# Patient Record
Sex: FEMALE | Race: WHITE | NOT HISPANIC OR LATINO | Employment: OTHER | URBAN - METROPOLITAN AREA
[De-identification: names, ages, dates, MRNs, and addresses within clinical notes are randomized per-mention and may not be internally consistent; named-entity substitution may affect disease eponyms.]

---

## 2018-09-14 ENCOUNTER — HOSPITAL ENCOUNTER (OUTPATIENT)
Facility: MEDICAL CENTER | Age: 68
DRG: 270 | End: 2018-09-14
Admitting: HOSPITALIST
Payer: MEDICARE

## 2018-09-14 ENCOUNTER — HOSPITAL ENCOUNTER (INPATIENT)
Facility: MEDICAL CENTER | Age: 68
LOS: 8 days | DRG: 270 | End: 2018-09-22
Attending: HOSPITALIST | Admitting: HOSPITALIST
Payer: MEDICARE

## 2018-09-14 DIAGNOSIS — B95.7 STAPHYLOCOCCUS EPIDERMIDIS BACTEREMIA: ICD-10-CM

## 2018-09-14 DIAGNOSIS — R78.81 STAPHYLOCOCCUS EPIDERMIDIS BACTEREMIA: ICD-10-CM

## 2018-09-14 DIAGNOSIS — J43.2 CENTRILOBULAR EMPHYSEMA (HCC): Primary | ICD-10-CM

## 2018-09-14 PROCEDURE — 770006 HCHG ROOM/CARE - MED/SURG/GYN SEMI*

## 2018-09-14 PROCEDURE — 99407 BEHAV CHNG SMOKING > 10 MIN: CPT | Performed by: HOSPITALIST

## 2018-09-14 PROCEDURE — 99358 PROLONG SERVICE W/O CONTACT: CPT | Performed by: HOSPITALIST

## 2018-09-14 PROCEDURE — 99223 1ST HOSP IP/OBS HIGH 75: CPT | Mod: 25 | Performed by: HOSPITALIST

## 2018-09-14 RX ORDER — PREDNISONE 10 MG/1
10 TABLET ORAL DAILY
Status: DISCONTINUED | OUTPATIENT
Start: 2018-09-15 | End: 2018-09-15

## 2018-09-14 RX ORDER — DILTIAZEM HYDROCHLORIDE 360 MG/1
360 CAPSULE, EXTENDED RELEASE ORAL DAILY
Status: DISCONTINUED | OUTPATIENT
Start: 2018-09-15 | End: 2018-09-15

## 2018-09-14 RX ORDER — DILTIAZEM HYDROCHLORIDE 360 MG/1
360 CAPSULE, EXTENDED RELEASE ORAL DAILY
COMMUNITY

## 2018-09-14 RX ORDER — GUAIFENESIN 600 MG/1
1200 TABLET, EXTENDED RELEASE ORAL EVERY 12 HOURS
COMMUNITY

## 2018-09-14 RX ORDER — METOPROLOL SUCCINATE 100 MG/1
100 TABLET, EXTENDED RELEASE ORAL 2 TIMES DAILY
Status: DISCONTINUED | OUTPATIENT
Start: 2018-09-15 | End: 2018-09-15

## 2018-09-14 RX ORDER — TRAMADOL HYDROCHLORIDE 50 MG/1
50 TABLET ORAL EVERY 4 HOURS PRN
COMMUNITY

## 2018-09-14 RX ORDER — METOPROLOL SUCCINATE 100 MG/1
100 TABLET, EXTENDED RELEASE ORAL 2 TIMES DAILY
COMMUNITY

## 2018-09-14 RX ORDER — AMOXICILLIN 250 MG
2 CAPSULE ORAL 2 TIMES DAILY
Status: DISCONTINUED | OUTPATIENT
Start: 2018-09-15 | End: 2018-09-22 | Stop reason: HOSPADM

## 2018-09-14 RX ORDER — RANITIDINE 150 MG/1
150 TABLET ORAL DAILY
COMMUNITY

## 2018-09-14 RX ORDER — FAMOTIDINE 20 MG/1
20 TABLET, FILM COATED ORAL DAILY
Status: DISCONTINUED | OUTPATIENT
Start: 2018-09-15 | End: 2018-09-15

## 2018-09-14 RX ORDER — BISACODYL 10 MG
10 SUPPOSITORY, RECTAL RECTAL
Status: DISCONTINUED | OUTPATIENT
Start: 2018-09-14 | End: 2018-09-22 | Stop reason: HOSPADM

## 2018-09-14 RX ORDER — TRAMADOL HYDROCHLORIDE 50 MG/1
50 TABLET ORAL EVERY 4 HOURS PRN
Status: DISCONTINUED | OUTPATIENT
Start: 2018-09-14 | End: 2018-09-18

## 2018-09-14 RX ORDER — FUROSEMIDE 20 MG/1
20 TABLET ORAL
Status: DISCONTINUED | OUTPATIENT
Start: 2018-09-14 | End: 2018-09-16

## 2018-09-14 RX ORDER — OXYCODONE HYDROCHLORIDE 10 MG/1
10 TABLET ORAL
Status: DISCONTINUED | OUTPATIENT
Start: 2018-09-14 | End: 2018-09-18

## 2018-09-14 RX ORDER — OXYCODONE HYDROCHLORIDE 5 MG/1
5 TABLET ORAL
Status: DISCONTINUED | OUTPATIENT
Start: 2018-09-14 | End: 2018-09-18

## 2018-09-14 RX ORDER — HYDROMORPHONE HYDROCHLORIDE 2 MG/ML
0.5 INJECTION, SOLUTION INTRAMUSCULAR; INTRAVENOUS; SUBCUTANEOUS
Status: DISCONTINUED | OUTPATIENT
Start: 2018-09-14 | End: 2018-09-18

## 2018-09-14 RX ORDER — ONDANSETRON 4 MG/1
4 TABLET, ORALLY DISINTEGRATING ORAL EVERY 4 HOURS PRN
Status: DISCONTINUED | OUTPATIENT
Start: 2018-09-14 | End: 2018-09-22 | Stop reason: HOSPADM

## 2018-09-14 RX ORDER — FUROSEMIDE 20 MG/1
20 TABLET ORAL
COMMUNITY

## 2018-09-14 RX ORDER — PREDNISONE 10 MG/1
10 TABLET ORAL DAILY
Status: ON HOLD | COMMUNITY
End: 2018-09-22

## 2018-09-14 RX ORDER — BUDESONIDE AND FORMOTEROL FUMARATE DIHYDRATE 160; 4.5 UG/1; UG/1
2 AEROSOL RESPIRATORY (INHALATION) EVERY 12 HOURS
Status: DISCONTINUED | OUTPATIENT
Start: 2018-09-15 | End: 2018-09-17

## 2018-09-14 RX ORDER — ONDANSETRON 2 MG/ML
4 INJECTION INTRAMUSCULAR; INTRAVENOUS EVERY 4 HOURS PRN
Status: DISCONTINUED | OUTPATIENT
Start: 2018-09-14 | End: 2018-09-22 | Stop reason: HOSPADM

## 2018-09-14 RX ORDER — POLYETHYLENE GLYCOL 3350 17 G/17G
1 POWDER, FOR SOLUTION ORAL
Status: DISCONTINUED | OUTPATIENT
Start: 2018-09-14 | End: 2018-09-22 | Stop reason: HOSPADM

## 2018-09-14 ASSESSMENT — COGNITIVE AND FUNCTIONAL STATUS - GENERAL
TURNING FROM BACK TO SIDE WHILE IN FLAT BAD: A LITTLE
STANDING UP FROM CHAIR USING ARMS: A LITTLE
HELP NEEDED FOR BATHING: A LITTLE
TOILETING: A LITTLE
SUGGESTED CMS G CODE MODIFIER MOBILITY: CK
WALKING IN HOSPITAL ROOM: A LITTLE
MOBILITY SCORE: 18
DAILY ACTIVITIY SCORE: 19
SUGGESTED CMS G CODE MODIFIER DAILY ACTIVITY: CK
DRESSING REGULAR LOWER BODY CLOTHING: A LITTLE
CLIMB 3 TO 5 STEPS WITH RAILING: A LITTLE
MOVING TO AND FROM BED TO CHAIR: A LITTLE
MOVING FROM LYING ON BACK TO SITTING ON SIDE OF FLAT BED: A LITTLE
PERSONAL GROOMING: A LITTLE
DRESSING REGULAR UPPER BODY CLOTHING: A LITTLE

## 2018-09-14 ASSESSMENT — PATIENT HEALTH QUESTIONNAIRE - PHQ9
2. FEELING DOWN, DEPRESSED, IRRITABLE, OR HOPELESS: NOT AT ALL
SUM OF ALL RESPONSES TO PHQ9 QUESTIONS 1 AND 2: 0
1. LITTLE INTEREST OR PLEASURE IN DOING THINGS: NOT AT ALL

## 2018-09-14 ASSESSMENT — COPD QUESTIONNAIRES
DO YOU EVER COUGH UP ANY MUCUS OR PHLEGM?: YES, A FEW DAYS A WEEK OR MONTH
HAVE YOU SMOKED AT LEAST 100 CIGARETTES IN YOUR ENTIRE LIFE: YES
COPD SCREENING SCORE: 8
IN THE PAST 12 MONTHS DO YOU DO LESS THAN YOU USED TO BECAUSE OF YOUR BREATHING PROBLEMS: AGREE
DURING THE PAST 4 WEEKS HOW MUCH DID YOU FEEL SHORT OF BREATH: MOST  OR ALL OF THE TIME

## 2018-09-14 ASSESSMENT — LIFESTYLE VARIABLES
ALCOHOL_USE: NO
EVER_SMOKED: YES

## 2018-09-14 ASSESSMENT — PAIN SCALES - GENERAL: PAINLEVEL_OUTOF10: 2

## 2018-09-14 NOTE — PROGRESS NOTES
Direct admit from [facility]. Accepted by Dr. NORMA Kendall for infected portacath. Dr Ghotra will consult. ADT signed & held, needs to be released upon pt arrival.  No written orders received.

## 2018-09-15 ENCOUNTER — APPOINTMENT (OUTPATIENT)
Dept: RADIOLOGY | Facility: MEDICAL CENTER | Age: 68
DRG: 270 | End: 2018-09-15
Attending: INTERNAL MEDICINE
Payer: MEDICARE

## 2018-09-15 ENCOUNTER — APPOINTMENT (OUTPATIENT)
Dept: RADIOLOGY | Facility: MEDICAL CENTER | Age: 68
DRG: 270 | End: 2018-09-15
Attending: ORTHOPAEDIC SURGERY
Payer: MEDICARE

## 2018-09-15 ENCOUNTER — APPOINTMENT (OUTPATIENT)
Dept: RADIOLOGY | Facility: MEDICAL CENTER | Age: 68
DRG: 270 | End: 2018-09-15
Attending: SURGERY
Payer: MEDICARE

## 2018-09-15 PROBLEM — D68.318 ACQUIRED CIRCULATING ANTICOAGULANTS (HCC): Status: ACTIVE | Noted: 2018-09-15

## 2018-09-15 PROBLEM — J96.21 ACUTE ON CHRONIC RESPIRATORY FAILURE WITH HYPOXIA (HCC): Status: ACTIVE | Noted: 2018-09-15

## 2018-09-15 PROBLEM — B95.7 STAPHYLOCOCCUS EPIDERMIDIS BACTEREMIA: Status: ACTIVE | Noted: 2018-09-15

## 2018-09-15 PROBLEM — A41.9 SEPSIS (HCC): Status: ACTIVE | Noted: 2018-09-15

## 2018-09-15 PROBLEM — Z72.0 TOBACCO ABUSE: Status: ACTIVE | Noted: 2018-09-15

## 2018-09-15 PROBLEM — I48.0 PAROXYSMAL ATRIAL FIBRILLATION (HCC): Status: ACTIVE | Noted: 2018-09-15

## 2018-09-15 PROBLEM — E88.01 ALPHA-1-ANTITRYPSIN DEFICIENCY (HCC): Status: ACTIVE | Noted: 2018-09-15

## 2018-09-15 PROBLEM — F17.200 TOBACCO DEPENDENCE: Status: ACTIVE | Noted: 2018-09-15

## 2018-09-15 PROBLEM — J44.9 COPD (CHRONIC OBSTRUCTIVE PULMONARY DISEASE) (HCC): Status: ACTIVE | Noted: 2018-09-15

## 2018-09-15 PROBLEM — J18.9 CAP (COMMUNITY ACQUIRED PNEUMONIA): Status: ACTIVE | Noted: 2018-09-15

## 2018-09-15 PROBLEM — J96.11 CHRONIC HYPOXEMIC RESPIRATORY FAILURE (HCC): Status: ACTIVE | Noted: 2018-09-15

## 2018-09-15 PROBLEM — T80.219A INFECTED VENOUS ACCESS PORT: Status: ACTIVE | Noted: 2018-09-15

## 2018-09-15 PROBLEM — K21.9 GERD (GASTROESOPHAGEAL REFLUX DISEASE): Status: ACTIVE | Noted: 2018-09-15

## 2018-09-15 PROBLEM — R78.81 BACTEREMIA: Status: ACTIVE | Noted: 2018-09-15

## 2018-09-15 LAB
ACTION RANGE TRIGGERED IACRT: YES
ALBUMIN SERPL BCP-MCNC: 3.3 G/DL (ref 3.2–4.9)
ALBUMIN/GLOB SERPL: 1.4 G/DL
ALP SERPL-CCNC: 53 U/L (ref 30–99)
ALT SERPL-CCNC: 24 U/L (ref 2–50)
ANION GAP SERPL CALC-SCNC: 3 MMOL/L (ref 0–11.9)
AST SERPL-CCNC: 11 U/L (ref 12–45)
BASE EXCESS BLDA CALC-SCNC: 7 MMOL/L (ref -4–3)
BASE EXCESS BLDA CALC-SCNC: 8 MMOL/L (ref -4–3)
BASE EXCESS BLDA CALC-SCNC: 9 MMOL/L (ref -4–3)
BASOPHILS # BLD AUTO: 0.2 % (ref 0–1.8)
BASOPHILS # BLD: 0.03 K/UL (ref 0–0.12)
BILIRUB SERPL-MCNC: 0.4 MG/DL (ref 0.1–1.5)
BODY TEMPERATURE: ABNORMAL DEGREES
BUN SERPL-MCNC: 18 MG/DL (ref 8–22)
CALCIUM SERPL-MCNC: 8.8 MG/DL (ref 8.5–10.5)
CHLORIDE SERPL-SCNC: 95 MMOL/L (ref 96–112)
CHOLEST SERPL-MCNC: 204 MG/DL (ref 100–199)
CO2 BLDA-SCNC: 35 MMOL/L (ref 20–33)
CO2 BLDA-SCNC: 38 MMOL/L (ref 20–33)
CO2 BLDA-SCNC: 41 MMOL/L (ref 20–33)
CO2 SERPL-SCNC: 36 MMOL/L (ref 20–33)
CREAT SERPL-MCNC: 0.4 MG/DL (ref 0.5–1.4)
EKG IMPRESSION: NORMAL
EOSINOPHIL # BLD AUTO: 0.18 K/UL (ref 0–0.51)
EOSINOPHIL NFR BLD: 0.9 % (ref 0–6.9)
ERYTHROCYTE [DISTWIDTH] IN BLOOD BY AUTOMATED COUNT: 47.2 FL (ref 35.9–50)
EST. AVERAGE GLUCOSE BLD GHB EST-MCNC: 146 MG/DL
GLOBULIN SER CALC-MCNC: 2.3 G/DL (ref 1.9–3.5)
GLUCOSE SERPL-MCNC: 129 MG/DL (ref 65–99)
HBA1C MFR BLD: 6.7 % (ref 0–5.6)
HCO3 BLDA-SCNC: 33.5 MMOL/L (ref 17–25)
HCO3 BLDA-SCNC: 36 MMOL/L (ref 17–25)
HCO3 BLDA-SCNC: 38.7 MMOL/L (ref 17–25)
HCT VFR BLD AUTO: 42.3 % (ref 37–47)
HDLC SERPL-MCNC: 72 MG/DL
HGB BLD-MCNC: 12.8 G/DL (ref 12–16)
IMM GRANULOCYTES # BLD AUTO: 0.14 K/UL (ref 0–0.11)
IMM GRANULOCYTES NFR BLD AUTO: 0.7 % (ref 0–0.9)
INST. QUALIFIED PATIENT IIQPT: YES
LDLC SERPL CALC-MCNC: 113 MG/DL
LYMPHOCYTES # BLD AUTO: 3.56 K/UL (ref 1–4.8)
LYMPHOCYTES NFR BLD: 18.1 % (ref 22–41)
MCH RBC QN AUTO: 26 PG (ref 27–33)
MCHC RBC AUTO-ENTMCNC: 30.3 G/DL (ref 33.6–35)
MCV RBC AUTO: 85.8 FL (ref 81.4–97.8)
MONOCYTES # BLD AUTO: 1.84 K/UL (ref 0–0.85)
MONOCYTES NFR BLD AUTO: 9.3 % (ref 0–13.4)
NEUTROPHILS # BLD AUTO: 13.95 K/UL (ref 2–7.15)
NEUTROPHILS NFR BLD: 70.8 % (ref 44–72)
NRBC # BLD AUTO: 0 K/UL
NRBC BLD-RTO: 0 /100 WBC
O2/TOTAL GAS SETTING VFR VENT: 40 %
O2/TOTAL GAS SETTING VFR VENT: 50 %
O2/TOTAL GAS SETTING VFR VENT: 50 %
PCO2 BLDA: 53.7 MMHG (ref 26–37)
PCO2 BLDA: 64.8 MMHG (ref 26–37)
PCO2 BLDA: 81.9 MMHG (ref 26–37)
PCO2 TEMP ADJ BLDA: 53.5 MMHG (ref 26–37)
PCO2 TEMP ADJ BLDA: 63.1 MMHG (ref 26–37)
PH BLDA: 7.28 [PH] (ref 7.4–7.5)
PH BLDA: 7.35 [PH] (ref 7.4–7.5)
PH BLDA: 7.4 [PH] (ref 7.4–7.5)
PH TEMP ADJ BLDA: 7.36 [PH] (ref 7.4–7.5)
PH TEMP ADJ BLDA: 7.4 [PH] (ref 7.4–7.5)
PLATELET # BLD AUTO: 279 K/UL (ref 164–446)
PMV BLD AUTO: 9.8 FL (ref 9–12.9)
PO2 BLDA: 53 MMHG (ref 64–87)
PO2 BLDA: 67 MMHG (ref 64–87)
PO2 BLDA: 72 MMHG (ref 64–87)
PO2 TEMP ADJ BLDA: 52 MMHG (ref 64–87)
PO2 TEMP ADJ BLDA: 64 MMHG (ref 64–87)
POTASSIUM SERPL-SCNC: 4 MMOL/L (ref 3.6–5.5)
PROCALCITONIN SERPL-MCNC: <0.05 NG/ML
PROT SERPL-MCNC: 5.6 G/DL (ref 6–8.2)
RBC # BLD AUTO: 4.93 M/UL (ref 4.2–5.4)
SAO2 % BLDA: 86 % (ref 93–99)
SAO2 % BLDA: 91 % (ref 93–99)
SAO2 % BLDA: 91 % (ref 93–99)
SODIUM SERPL-SCNC: 134 MMOL/L (ref 135–145)
SPECIMEN DRAWN FROM PATIENT: ABNORMAL
TRIGL SERPL-MCNC: 92 MG/DL (ref 0–149)
TRIGL SERPL-MCNC: 93 MG/DL (ref 0–149)
TSH SERPL DL<=0.005 MIU/L-ACNC: 0.69 UIU/ML (ref 0.38–5.33)
WBC # BLD AUTO: 19.7 K/UL (ref 4.8–10.8)

## 2018-09-15 PROCEDURE — 94150 VITAL CAPACITY TEST: CPT

## 2018-09-15 PROCEDURE — 700111 HCHG RX REV CODE 636 W/ 250 OVERRIDE (IP): Performed by: INTERNAL MEDICINE

## 2018-09-15 PROCEDURE — 87116 MYCOBACTERIA CULTURE: CPT

## 2018-09-15 PROCEDURE — 83036 HEMOGLOBIN GLYCOSYLATED A1C: CPT

## 2018-09-15 PROCEDURE — 160041 HCHG SURGERY MINUTES - EA ADDL 1 MIN LEVEL 4: Performed by: SURGERY

## 2018-09-15 PROCEDURE — 51798 US URINE CAPACITY MEASURE: CPT

## 2018-09-15 PROCEDURE — 99223 1ST HOSP IP/OBS HIGH 75: CPT | Performed by: INTERNAL MEDICINE

## 2018-09-15 PROCEDURE — 700111 HCHG RX REV CODE 636 W/ 250 OVERRIDE (IP)

## 2018-09-15 PROCEDURE — 93005 ELECTROCARDIOGRAM TRACING: CPT | Performed by: SURGERY

## 2018-09-15 PROCEDURE — 87205 SMEAR GRAM STAIN: CPT

## 2018-09-15 PROCEDURE — 0P8B0ZZ DIVISION OF LEFT CLAVICLE, OPEN APPROACH: ICD-10-PCS | Performed by: SURGERY

## 2018-09-15 PROCEDURE — 770022 HCHG ROOM/CARE - ICU (200)

## 2018-09-15 PROCEDURE — 160048 HCHG OR STATISTICAL LEVEL 1-5: Performed by: SURGERY

## 2018-09-15 PROCEDURE — 73000 X-RAY EXAM OF COLLAR BONE: CPT | Mod: LT

## 2018-09-15 PROCEDURE — 700105 HCHG RX REV CODE 258: Performed by: HOSPITALIST

## 2018-09-15 PROCEDURE — 84145 PROCALCITONIN (PCT): CPT

## 2018-09-15 PROCEDURE — 501838 HCHG SUTURE GENERAL: Performed by: SURGERY

## 2018-09-15 PROCEDURE — 36600 WITHDRAWAL OF ARTERIAL BLOOD: CPT

## 2018-09-15 PROCEDURE — 80053 COMPREHEN METABOLIC PANEL: CPT

## 2018-09-15 PROCEDURE — 03C43ZZ EXTIRPATION OF MATTER FROM LEFT SUBCLAVIAN ARTERY, PERCUTANEOUS APPROACH: ICD-10-PCS | Performed by: SURGERY

## 2018-09-15 PROCEDURE — 87040 BLOOD CULTURE FOR BACTERIA: CPT | Mod: 91

## 2018-09-15 PROCEDURE — 71045 X-RAY EXAM CHEST 1 VIEW: CPT

## 2018-09-15 PROCEDURE — 99233 SBSQ HOSP IP/OBS HIGH 50: CPT | Performed by: INTERNAL MEDICINE

## 2018-09-15 PROCEDURE — 84443 ASSAY THYROID STIM HORMONE: CPT

## 2018-09-15 PROCEDURE — 87102 FUNGUS ISOLATION CULTURE: CPT

## 2018-09-15 PROCEDURE — 87075 CULTR BACTERIA EXCEPT BLOOD: CPT

## 2018-09-15 PROCEDURE — 160029 HCHG SURGERY MINUTES - 1ST 30 MINS LEVEL 4: Performed by: SURGERY

## 2018-09-15 PROCEDURE — 84478 ASSAY OF TRIGLYCERIDES: CPT

## 2018-09-15 PROCEDURE — C1713 ANCHOR/SCREW BN/BN,TIS/BN: HCPCS | Performed by: SURGERY

## 2018-09-15 PROCEDURE — 700102 HCHG RX REV CODE 250 W/ 637 OVERRIDE(OP): Performed by: HOSPITALIST

## 2018-09-15 PROCEDURE — 700101 HCHG RX REV CODE 250: Performed by: INTERNAL MEDICINE

## 2018-09-15 PROCEDURE — 700111 HCHG RX REV CODE 636 W/ 250 OVERRIDE (IP): Performed by: HOSPITALIST

## 2018-09-15 PROCEDURE — 99291 CRITICAL CARE FIRST HOUR: CPT | Performed by: INTERNAL MEDICINE

## 2018-09-15 PROCEDURE — 700101 HCHG RX REV CODE 250: Performed by: HOSPITALIST

## 2018-09-15 PROCEDURE — 700105 HCHG RX REV CODE 258: Performed by: INTERNAL MEDICINE

## 2018-09-15 PROCEDURE — 87206 SMEAR FLUORESCENT/ACID STAI: CPT

## 2018-09-15 PROCEDURE — 87070 CULTURE OTHR SPECIMN AEROBIC: CPT

## 2018-09-15 PROCEDURE — 700102 HCHG RX REV CODE 250 W/ 637 OVERRIDE(OP): Performed by: INTERNAL MEDICINE

## 2018-09-15 PROCEDURE — 80061 LIPID PANEL: CPT

## 2018-09-15 PROCEDURE — 0PSB04Z REPOSITION LEFT CLAVICLE WITH INTERNAL FIXATION DEVICE, OPEN APPROACH: ICD-10-PCS | Performed by: ORTHOPAEDIC SURGERY

## 2018-09-15 PROCEDURE — A9270 NON-COVERED ITEM OR SERVICE: HCPCS | Performed by: INTERNAL MEDICINE

## 2018-09-15 PROCEDURE — 94640 AIRWAY INHALATION TREATMENT: CPT

## 2018-09-15 PROCEDURE — A9270 NON-COVERED ITEM OR SERVICE: HCPCS | Performed by: HOSPITALIST

## 2018-09-15 PROCEDURE — 85025 COMPLETE CBC W/AUTO DIFF WBC: CPT

## 2018-09-15 PROCEDURE — 160009 HCHG ANES TIME/MIN: Performed by: SURGERY

## 2018-09-15 PROCEDURE — 87015 SPECIMEN INFECT AGNT CONCNTJ: CPT

## 2018-09-15 PROCEDURE — 94002 VENT MGMT INPAT INIT DAY: CPT

## 2018-09-15 PROCEDURE — 700101 HCHG RX REV CODE 250

## 2018-09-15 PROCEDURE — 36415 COLL VENOUS BLD VENIPUNCTURE: CPT

## 2018-09-15 PROCEDURE — 82803 BLOOD GASES ANY COMBINATION: CPT

## 2018-09-15 DEVICE — SCREW CORT 3.5X20MM ST HEX (4TX6+1TX4+1TX3=31)(SDS=22): Type: IMPLANTABLE DEVICE | Site: CLAVICLE | Status: FUNCTIONAL

## 2018-09-15 DEVICE — IMPLANTABLE DEVICE: Type: IMPLANTABLE DEVICE | Site: CLAVICLE | Status: FUNCTIONAL

## 2018-09-15 DEVICE — SCREW CORT 3.5X16MM ST HEX (4TX6+1TX4+1TX3=31)(SDS=22): Type: IMPLANTABLE DEVICE | Site: CLAVICLE | Status: FUNCTIONAL

## 2018-09-15 DEVICE — SCREW LOCK 3.5X18MM ST T15 - (4SFLX6+LPPELVX4=28): Type: IMPLANTABLE DEVICE | Site: CLAVICLE | Status: FUNCTIONAL

## 2018-09-15 DEVICE — SCREW LOCK 3.5X12MM ST T15 - (4SFLX6+LPPELVX4=28): Type: IMPLANTABLE DEVICE | Site: CLAVICLE | Status: FUNCTIONAL

## 2018-09-15 DEVICE — SCREW CORT 3.5X12MM ST HEX (4TX6+1TX4+1TX3=31)(SDS=22): Type: IMPLANTABLE DEVICE | Site: CLAVICLE | Status: FUNCTIONAL

## 2018-09-15 DEVICE — SCREW LOCK 3.5X20MM ST T15 - (4SFLX6+LPPELVX4=28): Type: IMPLANTABLE DEVICE | Site: CLAVICLE | Status: FUNCTIONAL

## 2018-09-15 RX ORDER — SODIUM CHLORIDE 9 MG/ML
500 INJECTION, SOLUTION INTRAVENOUS ONCE
Status: ACTIVE | OUTPATIENT
Start: 2018-09-15 | End: 2018-09-16

## 2018-09-15 RX ORDER — FUROSEMIDE 10 MG/ML
20 INJECTION INTRAMUSCULAR; INTRAVENOUS ONCE
Status: COMPLETED | OUTPATIENT
Start: 2018-09-15 | End: 2018-09-15

## 2018-09-15 RX ORDER — IPRATROPIUM BROMIDE AND ALBUTEROL SULFATE 2.5; .5 MG/3ML; MG/3ML
3 SOLUTION RESPIRATORY (INHALATION)
Status: DISCONTINUED | OUTPATIENT
Start: 2018-09-15 | End: 2018-09-16

## 2018-09-15 RX ORDER — HEPARIN SODIUM 5000 [USP'U]/ML
5000 INJECTION, SOLUTION INTRAVENOUS; SUBCUTANEOUS EVERY 8 HOURS
Status: DISCONTINUED | OUTPATIENT
Start: 2018-09-15 | End: 2018-09-17

## 2018-09-15 RX ORDER — GUAIFENESIN 600 MG/1
1200 TABLET, EXTENDED RELEASE ORAL EVERY 12 HOURS
Status: DISCONTINUED | OUTPATIENT
Start: 2018-09-15 | End: 2018-09-15

## 2018-09-15 RX ORDER — SODIUM CHLORIDE 9 MG/ML
INJECTION, SOLUTION INTRAVENOUS CONTINUOUS
Status: DISCONTINUED | OUTPATIENT
Start: 2018-09-15 | End: 2018-09-16

## 2018-09-15 RX ORDER — SODIUM CHLORIDE 9 MG/ML
500 INJECTION, SOLUTION INTRAVENOUS
Status: DISCONTINUED | OUTPATIENT
Start: 2018-09-15 | End: 2018-09-16

## 2018-09-15 RX ORDER — IPRATROPIUM BROMIDE AND ALBUTEROL SULFATE 2.5; .5 MG/3ML; MG/3ML
3 SOLUTION RESPIRATORY (INHALATION)
Status: DISCONTINUED | OUTPATIENT
Start: 2018-09-15 | End: 2018-09-21

## 2018-09-15 RX ORDER — IPRATROPIUM BROMIDE AND ALBUTEROL SULFATE 2.5; .5 MG/3ML; MG/3ML
3 SOLUTION RESPIRATORY (INHALATION)
Status: DISCONTINUED | OUTPATIENT
Start: 2018-09-15 | End: 2018-09-15 | Stop reason: ALTCHOICE

## 2018-09-15 RX ORDER — SODIUM CHLORIDE 9 MG/ML
500 INJECTION, SOLUTION INTRAVENOUS ONCE
Status: COMPLETED | OUTPATIENT
Start: 2018-09-15 | End: 2018-09-15

## 2018-09-15 RX ORDER — FAMOTIDINE 20 MG/1
20 TABLET, FILM COATED ORAL EVERY 12 HOURS
Status: DISCONTINUED | OUTPATIENT
Start: 2018-09-15 | End: 2018-09-16

## 2018-09-15 RX ORDER — SIMVASTATIN 10 MG
10 TABLET ORAL EVERY EVENING
Status: DISCONTINUED | OUTPATIENT
Start: 2018-09-15 | End: 2018-09-22 | Stop reason: HOSPADM

## 2018-09-15 RX ORDER — DILTIAZEM HCL 90 MG
90 TABLET ORAL EVERY 6 HOURS
Status: DISCONTINUED | OUTPATIENT
Start: 2018-09-15 | End: 2018-09-16

## 2018-09-15 RX ORDER — SODIUM CHLORIDE 9 MG/ML
INJECTION, SOLUTION INTRAVENOUS
Status: COMPLETED
Start: 2018-09-15 | End: 2018-09-15

## 2018-09-15 RX ORDER — METHYLPREDNISOLONE SODIUM SUCCINATE 125 MG/2ML
62.5 INJECTION, POWDER, LYOPHILIZED, FOR SOLUTION INTRAMUSCULAR; INTRAVENOUS EVERY 8 HOURS
Status: DISCONTINUED | OUTPATIENT
Start: 2018-09-15 | End: 2018-09-17

## 2018-09-15 RX ORDER — BUPIVACAINE HYDROCHLORIDE 5 MG/ML
INJECTION, SOLUTION EPIDURAL; INTRACAUDAL
Status: DISCONTINUED | OUTPATIENT
Start: 2018-09-15 | End: 2018-09-15 | Stop reason: HOSPADM

## 2018-09-15 RX ADMIN — HYDROMORPHONE HYDROCHLORIDE 0.5 MG: 2 INJECTION INTRAMUSCULAR; INTRAVENOUS; SUBCUTANEOUS at 17:07

## 2018-09-15 RX ADMIN — FENTANYL CITRATE 50 MCG: 50 INJECTION, SOLUTION INTRAMUSCULAR; INTRAVENOUS at 19:34

## 2018-09-15 RX ADMIN — SODIUM CHLORIDE 500 ML: 9 INJECTION, SOLUTION INTRAVENOUS at 19:00

## 2018-09-15 RX ADMIN — VANCOMYCIN HYDROCHLORIDE 1800 MG: 100 INJECTION, POWDER, LYOPHILIZED, FOR SOLUTION INTRAVENOUS at 01:19

## 2018-09-15 RX ADMIN — METHYLPREDNISOLONE SODIUM SUCCINATE 62.5 MG: 125 INJECTION, POWDER, FOR SOLUTION INTRAMUSCULAR; INTRAVENOUS at 21:29

## 2018-09-15 RX ADMIN — IPRATROPIUM BROMIDE AND ALBUTEROL SULFATE 3 ML: .5; 3 SOLUTION RESPIRATORY (INHALATION) at 16:22

## 2018-09-15 RX ADMIN — PROPOFOL 80 MCG/KG/MIN: 10 INJECTION, EMULSION INTRAVENOUS at 15:50

## 2018-09-15 RX ADMIN — METHYLPREDNISOLONE SODIUM SUCCINATE 62.5 MG: 125 INJECTION, POWDER, FOR SOLUTION INTRAMUSCULAR; INTRAVENOUS at 17:07

## 2018-09-15 RX ADMIN — SODIUM CHLORIDE: 9 INJECTION, SOLUTION INTRAVENOUS at 21:37

## 2018-09-15 RX ADMIN — METOPROLOL TARTRATE 100 MG: 25 TABLET, FILM COATED ORAL at 21:29

## 2018-09-15 RX ADMIN — PROPOFOL 50 MCG/KG/MIN: 10 INJECTION, EMULSION INTRAVENOUS at 19:32

## 2018-09-15 RX ADMIN — BUDESONIDE AND FORMOTEROL FUMARATE DIHYDRATE 2 PUFF: 160; 4.5 AEROSOL RESPIRATORY (INHALATION) at 06:01

## 2018-09-15 RX ADMIN — HEPARIN SODIUM 5000 UNITS: 5000 INJECTION, SOLUTION INTRAVENOUS; SUBCUTANEOUS at 21:29

## 2018-09-15 RX ADMIN — GUAIFENESIN 200 MG: 100 SOLUTION ORAL at 21:29

## 2018-09-15 RX ADMIN — FAMOTIDINE 20 MG: 10 INJECTION, SOLUTION INTRAVENOUS at 18:58

## 2018-09-15 RX ADMIN — FUROSEMIDE 20 MG: 10 INJECTION, SOLUTION INTRAMUSCULAR; INTRAVENOUS at 20:01

## 2018-09-15 RX ADMIN — IPRATROPIUM BROMIDE AND ALBUTEROL SULFATE 3 ML: .5; 3 SOLUTION RESPIRATORY (INHALATION) at 18:54

## 2018-09-15 ASSESSMENT — PAIN SCALES - GENERAL
PAINLEVEL_OUTOF10: 0
PAINLEVEL_OUTOF10: 2

## 2018-09-15 ASSESSMENT — PATIENT HEALTH QUESTIONNAIRE - PHQ9
SUM OF ALL RESPONSES TO PHQ9 QUESTIONS 1 AND 2: 0
1. LITTLE INTEREST OR PLEASURE IN DOING THINGS: NOT AT ALL
2. FEELING DOWN, DEPRESSED, IRRITABLE, OR HOPELESS: NOT AT ALL

## 2018-09-15 ASSESSMENT — PULMONARY FUNCTION TESTS: FVC: 1.2

## 2018-09-15 NOTE — CARE PLAN
Problem: Pain Management  Goal: Pain level will decrease to patient's comfort goal  Patient denying pain at this time. Encouraged patient to notify RN with pain.

## 2018-09-15 NOTE — ASSESSMENT & PLAN NOTE
- weaned off HFNC to NC at baseline of 5L NC  - cont steroid taper to 10mg which she takes at baseline

## 2018-09-15 NOTE — ASSESSMENT & PLAN NOTE
This is sepsis (without associated acute organ dysfunction).    Source is pneumonia, and infected venous port/bacteremia.  Sepsis is resolved, her hemodynamics are stable

## 2018-09-15 NOTE — PROGRESS NOTES
Assumed care of patient. Patient denying pain or nausea. Only complaint is being thirsty due to being NPO. Patient on 5 L of oxygen and this her baseline at home. Oxygen tubing switched to flexible, soft tubing. Patient ambulating to the bathroom with steady gait. Patient aware of possible surgery today. No other needs at this time. Call light within reach.

## 2018-09-15 NOTE — PROGRESS NOTES
Patient down to [facility] Pre Op with transport. Patient in gown and appropriate. SCD's in place. Patient voided prior to transports arrival. Patient going down on 5 L of oxygen.

## 2018-09-15 NOTE — PROGRESS NOTES
"Pharmacy Kinetics 68 y.o. female on vancomycin day # 1 9/15/2018    Currently on Vancomycin 1400 mg iv q24hr (0100)    Indication for Treatment: r/o bacteremia    Pertinent history per medical record: Admitted on 2018 for line infection.  Patient presented to [facility] facility with shortness of breath and evidence of pneumonia.  Later she had blood cultures grow staph epi with suspicion of a line infection.  Transferred to [facility] for vascular consult.     Other antibiotics: none    Allergies: Patient has no known allergies.     List concerns for renal function: BUN:SCr > 20, advanced age    Pertinent cultures to date:   none    Recent Labs      09/15/18   0345   WBC  19.7*   NEUTSPOLYS  70.80     Recent Labs      09/15/18   0345   BUN  18   CREATININE  0.40*   ALBUMIN  3.3     Intake/Output Summary (Last 24 hours) at 09/15/18 1405  Last data filed at 09/15/18 0805   Gross per 24 hour   Intake                0 ml   Output              500 ml   Net             -500 ml      Blood pressure 133/61, pulse 91, temperature 36.3 °C (97.3 °F), resp. rate 19, height 1.6 m (5' 3\"), weight 70.2 kg (154 lb 12.2 oz), SpO2 96 %, not currently breastfeeding. Temp (24hrs), Av.4 °C (97.5 °F), Min:36.2 °C (97.1 °F), Max:36.8 °C (98.2 °F)      A/P   1. Vancomycin dose change: not indicated  2. Next vancomycin level:  @ 0030 (not ordered)  3. Goal trough: 16-20 mcg/ml  4. Previous vancomycin treatment: no  5. MRSA nares swab: n/a  6. Comments: some concern for renal function with significantly elevated BUN:SCr ratio.  Will continue with plan for 1400 mg (20 mg/kg) q24h with first maintenance dose to be given 24 hrs after load.  Will plan to check a level prior to 4th total dose.  Pharmacy will monitor and adjust dosing if needed.      Deyanira Delacruz, PharmD    "

## 2018-09-15 NOTE — CONSULTS
Critical Care/Pulmonary Consultation    Date of Service: 9/15/2018    Date of Admission:  9/14/2018  9:46 PM    Consulting Physician: Vidal Ghotra M.D.    Chief Complaint:  Alpha-1 antitrypsin deficiency with chronic lung disease/hypoxemia    History of Present Illness: Mile Craig is a 68 y.o. female with a past medical history of alpha-1 antitrypsin deficiency on Prolastin intravenously q. weekly, COPD on home oxygen at 5 L/min nasal cannula, atrial fibrillation on Pradaxa who was evaluated at [facility] on September 14 complaining of shortness of breath and found to have evidence of pneumonia with fever, leukocytosis, abnormal chest x-ray findings.  She was started on antibiotics to cover for community acquired pneumonia.  Her blood cultures grew out staph epidermidis and the concern was that her port was infected and needed to be removed.  Removal was attempted by a surgeon at [facility] but unfortunately became sheared resulting in a retained catheter in the vessel.  She was transferred to [facility] for definitive care and seen by Dr. Vidal Gil who took her to the operating room today where she underwent successful retrieval of the catheter from the left subclavian/innominate vein but did require transection of the clavicle and repair of the subclavian vein followed by plating of the clavicle by orthopedics.  She was brought to the intensive care unit postprocedure for ongoing respiratory failure management and I was consulted by Dr. Ghotra.    Review of Systems   Unable to perform ROS: Critical illness       Home Medications     Reviewed by Carlos Day R.N. (Registered Nurse) on 09/15/18 at 1226  Med List Status: Complete   Medication Last Dose Status   bisacodyl (DULCOLAX) suppository 10 mg  Active   budesonide-formoterol (SYMBICORT) 160-4.5 MCG/ACT inhaler 2 Puff  Active   DILTIAZem CD (CARDIZEM CD) 360 MG CAPSULE SR 24 HR 9/14/2018 Active   DILTIAZem CD (CARDIZEM CD) capsule  "360 mg  Active   famotidine (PEPCID) tablet 20 mg  Active   fluticasone-salmeterol (ADVAIR) 500-50 MCG/DOSE AEROSOL POWDER, BREATH ACTIVATED  Active   furosemide (LASIX) 20 MG Tab 9/14/2018 Active   furosemide (LASIX) tablet 20 mg  Active   guaiFENesin LA (MUCINEX) 600 MG TABLET SR 12 HR 9/14/2018 Active   HYDROmorphone (DILAUDID) injection 0.5 mg  Active   ipratropium-albuterol (DUONEB) nebulizer solution  Active   magnesium hydroxide (MILK OF MAGNESIA) suspension 30 mL  Active   MD Alert...Vancomycin per Pharmacy  Active   metoprolol SR (TOPROL XL) 100 MG TABLET SR 24 HR 9/14/2018 Active   metoprolol SR (TOPROL XL) tablet 100 mg  Active   NON SPECIFIED  Active   NS (BOLUS) infusion 500 mL  Active   ondansetron (ZOFRAN ODT) dispertab 4 mg  Active   ondansetron (ZOFRAN) syringe/vial injection 4 mg  Active   oxyCODONE immediate release (ROXICODONE) tablet 10 mg  Active   oxyCODONE immediate-release (ROXICODONE) tablet 5 mg  Active   Pharmacy Consult Request ...Pain Management Review  Active   polyethylene glycol/lytes (MIRALAX) PACKET 1 Packet  Active   predniSONE (DELTASONE) 10 MG Tab 9/14/2018 Active   predniSONE (DELTASONE) tablet 10 mg  Active   raNITidine (ZANTAC) 150 MG Tab 9/14/2018 Active   Respiratory Care per Protocol  Active   senna-docusate (PERICOLACE or SENOKOT S) 8.6-50 MG per tablet 2 Tab  Active   tramadol (ULTRAM) 50 MG Tab 9/14/2018 Active   tramadol (ULTRAM) 50 MG tablet 50 mg  Active   vancomycin 1,400 mg in  mL IVPB  Active                Social History   Substance Use Topics   • Smoking status: Never Smoker   • Smokeless tobacco: Never Used   • Alcohol use Not on file        No past medical history on file.    No past surgical history on file.    Allergies: Patient has no known allergies.    No family history on file.    Vitals:    09/14/18 2125 09/14/18 2150 09/14/18 2350 09/15/18 0509   Height:  1.6 m (5' 3\")     Weight: 70.2 kg (154 lb 12.2 oz)      Weight % change since last entry.: 0 " %      BP: 146/60  139/60    Pulse: 81  75 82   Resp: 17  16 16   Temp: 36.8 °C (98.2 °F)  36.2 °C (97.1 °F)     09/15/18 0515 09/15/18 0705 09/15/18 1221   Height:      Weight:      Weight % change since last entry.:      BP: 139/64 133/61    Pulse: 79 81 91   Resp: 17 19    Temp: 36.3 °C (97.4 °F) 36.4 °C (97.6 °F) 36.3 °C (97.3 °F)       Physical Examination  General: Well-developed, well-nourished female sedated on mechanical ventilatory support in critical condition  Neuro/Psych: Heavily sedated currently postoperatively and withdraws to pain minimally, some agitation per RNs prior to sedation  HEENT: Normocephalic, atraumatic, PERRLA, dry oral mucosa membranes, endotracheal tube in position without evidence of intraoral trauma  CVS: Sinus tachycardia, distant heart sounds, no obvious murmur, nondisplaced PMI, radial pulses are 2+ and symmetric with brisk capillary refill, left anterior chest wall with dressing that is clean/dry/intact  Respiratory: Coarse rhonchi throughout bilateral lung fields without wheeze or prolonged next-door effort, breathing in synchrony with the ventilator with a mode of CMV, increased AP diameter the chest  Abdomen/: Soft, nontender, nondistended, normal bowel sounds, normal external female genitalia with Little catheter in place  Extremities: Moderate clubbing, no cyanosis, no edema, no calf asymmetry no palpable cord  Skin: Warm, pink, dry without lesions nor rash      Intake/Output Summary (Last 24 hours) at 09/15/18 1602  Last data filed at 09/15/18 0805   Gross per 24 hour   Intake                0 ml   Output              500 ml   Net             -500 ml       Recent Labs      09/15/18   0345   WBC  19.7*   NEUTSPOLYS  70.80   LYMPHOCYTES  18.10*   MONOCYTES  9.30   EOSINOPHILS  0.90   BASOPHILS  0.20   ASTSGOT  11*   ALTSGPT  24   ALKPHOSPHAT  53   TBILIRUBIN  0.4     Recent Labs      09/15/18   0345   SODIUM  134*   POTASSIUM  4.0   CHLORIDE  95*   CO2  36*   BUN  18    CREATININE  0.40*   CALCIUM  8.8     Recent Labs      09/15/18   0345   ALTSGPT  24   ASTSGOT  11*   ALKPHOSPHAT  53   TBILIRUBIN  0.4   GLUCOSE  129*           Invalid input(s): [id number]  DX-CHEST-PORTABLE (1 VIEW)   Final Result      Mild diffuse interstitial prominence/vascular congestion with probable trace effusions.      DX-PORTABLE FLUORO > 1 HOUR    (Results Pending)   DX-CLAVICLE LEFT    (Results Pending)       Patient Active Problem List   Diagnosis   • Staphylococcus epidermidis bacteremia, likely due to catheter related bloodstream infection/infected MediPort   • Infected venous access port   • Paroxysmal atrial fibrillation (HCC)   • Alpha-1-antitrypsin deficiency (HCC)   • COPD (chronic obstructive pulmonary disease) due to alpha-1 antitrypsin deficiency (HCC)   • Chronic hypoxemic respiratory failure due to COPD (HCC)   • Tobacco dependence   • GERD (gastroesophageal reflux disease)   • Sepsis due to CAP, CRBSI (HCC)   • Acquired circulating anticoagulants (HCC)   • CAP (community acquired pneumonia)       Assessment and Plan:  Acute on chronic hypoxemic respiratory failure -intubated perioperatively 9/15   - cont full vent support (ASV) working towards rapid extubation if tolerates   - CXR/ABG post-op   - RT/O2 protocols   - propofol gtt in the meantime  Alpha-1 antitrypsin deficiency/COPD on chronic 5 L/min nasal cannula 24 hours a day with acute exacerbation   - increase steroids to IV, BDs   - will need Prolastin when due if on formulary  Community acquired pneumonia with staph epidermidis bacteremia   - cont abx, ID consulted   - repeat Bcx   - KRISSY  Retained port catheter status post retrieval/left clavicle transection/subclavian vein repair 9/15   - surgical site management  Gastroesophageal reflux disease - PPI  Paroxysmal atrial fibrillation on chronic anticoagulation   - rate controlled, cont dilt and BB if BP tolerates   - holding anticoagulation until cleared by surgery  Tobacco  dependence - nicotine patch  Sepsis from pulmonary source    - s/p sepsis protocol   - monitor fluid and hemodynamic status closely   - abx  Hyponatremia   - monitor, MIVFs until rebecca diet  HLD - statin  Prophylaxis, NPO    Patient is critically ill requiring my active management of her mechanical ventilatory support, ongoing hypoxemic respiratory failure.    High risk of deterioration and worsening vital organ dysfunction and death without the above critical care interventions.    Discussed assessment/plan with RN, RT, pharmacy, Dr. Gil.  Critical care time: 35 minutes.  No time overlap.  Procedures are not included in this time.    Jeremy M Gonda, MD  [facility]

## 2018-09-15 NOTE — H&P
Hospital Medicine History & Physical Note    Date of Service  9/14/2018    Primary Care Physician  No primary care provider on file.    Consultants  Dr. Ghotra    Code Status  Full    Chief Complaint  Shortness of breath     History of Presenting Illness  68 y.o. female who presented 9/14/2018 with initial presentation of shortness of breath.  She is a transfer from [facility] she presented with dyspnea for 1 day found to have fever leukocytosis with bandemia and evidence of pneumonia and sepsis.  She was initially started on antibiotics treated for COPD exacerbation secondary to community-acquired pneumonia.  She subsequently had blood cultures drawn grew staph epi.  On 2 peripheral sites the patient does have an indwelling central line PowerPort for several years for her weekly Prolastin infusions for alpha-1 antitrypsin deficiency.  There is concern by infectious disease curbside consult for line infection.  The catheter seems to be infected and the patient was transferred here for vascular surgery consultation.  Patient was on Pradaxa for atrial fibrillation and this was discontinued 2 days ago.  Currently the patient still has shortness of breath at her baseline COPD 5 L.  She denies any fevers chills sweats.  Upon personal review of outside hospital records.  Patient has a leukocytosis of 21.5 hemoglobin 13.3 platelet count 263 sodium 138 potassium 4.5 chloride 95 CO2 32 glucose 126 BUN 18 creatinine 0.54        Review of Systems  ROS    Past Medical History  COPD alpha-1 antitrypsin deficiency paroxysmal atrial fibrillation diastolic dysfunction tobacco abuse chronic hypoxemic respiratory failure on 5 L baseline    Surgical History  Reviewed and noncontributory    Family History  Reviewed and noncontributory     Social History  Smoker 1/2ppd, denies alcohol or drug use     Allergies  No Known Allergies    Medications  Prior to Admission Medications   Prescriptions Last Dose Informant Patient Reported?  Taking?   DILTIAZem CD (CARDIZEM CD) 360 MG CAPSULE SR 24 HR 2018 at  1014  Yes Yes   Sig: Take 360 mg by mouth every day.   NON SPECIFIED   Yes Yes   Si mg/kg by Intravenous route every 7 days.   fluticasone-salmeterol (ADVAIR) 500-50 MCG/DOSE AEROSOL POWDER, BREATH ACTIVATED   Yes Yes   Sig: Inhale 1 Puff by mouth every 12 hours.   furosemide (LASIX) 20 MG Tab 2018 at Unknown time  Yes Yes   Sig: Take 20 mg by mouth 1 time daily as needed.   guaiFENesin LA (MUCINEX) 600 MG TABLET SR 12 HR 2018 at 1013  Yes Yes   Sig: Take 1,200 mg by mouth every 12 hours.   metoprolol SR (TOPROL XL) 100 MG TABLET SR 24 HR 2018 at 1013  Yes Yes   Sig: Take 100 mg by mouth 2 Times a Day.   predniSONE (DELTASONE) 10 MG Tab 2018 at  1013  Yes Yes   Sig: Take 10 mg by mouth every day.   raNITidine (ZANTAC) 150 MG Tab 2018 at  1013  Yes Yes   Sig: Take 150 mg by mouth every day.   tramadol (ULTRAM) 50 MG Tab 2018 at 1517  Yes Yes   Sig: Take 50 mg by mouth every four hours as needed for Moderate Pain.      Facility-Administered Medications: None       Physical Exam  Blood Pressure : 146/60   Temperature: 36.8 °C (98.2 °F)   Pulse: 81   Respiration: 17   Pulse Oximetry: 94 %     Physical Exam   Constitutional: She is oriented to person, place, and time. She appears well-developed and well-nourished. No distress.   HENT:   Head: Normocephalic and atraumatic.   Eyes: Pupils are equal, round, and reactive to light. Conjunctivae and EOM are normal.   Neck: Normal range of motion. Neck supple. No JVD present.   Cardiovascular: Normal rate, regular rhythm, normal heart sounds and intact distal pulses.    No murmur heard.  Pulmonary/Chest: Effort normal. No respiratory distress. She has wheezes. She exhibits tenderness.   Mild wheezing; Left sided port with tenderness around site   Abdominal: Soft. Bowel sounds are normal. She exhibits no distension. There is no tenderness.   Musculoskeletal: Normal range  of motion. She exhibits no edema.   Neurological: She is alert and oriented to person, place, and time. She exhibits normal muscle tone.   Skin: Skin is warm and dry.   Psychiatric: She has a normal mood and affect. Her behavior is normal. Judgment and thought content normal.   Nursing note and vitals reviewed.      Laboratory:      leukocytosis of 21.5 hemoglobin 13.3 platelet count 263 sodium 138 potassium 4.5 chloride 95 CO2 32 glucose 126 BUN 18 creatinine 0.54        No results for input(s): ALTSGPT, ASTSGOT, ALKPHOSPHAT, TBILIRUBIN, DBILIRUBIN, GAMMAGT, AMYLASE, LIPASE, ALB, PREALBUMIN, GLUCOSE in the last 72 hours.              No results found for: TROPONINI    Urinalysis:    No results found     Imaging:  No orders to display   Outside hospital chest x-ray with small bilateral pleural effusions and left basilar atelectasis outside hospital echocardiogram    Left ventricle normal ventricle size wall thickness systolic function left ventricular ejection fraction is 65% right ventricle normal right ventricle size and systolic function right atrium normal right side atrial size dilated inferior vena cava without inspiratory collapse left atrium normal left atrial size mitral valve thickened mitral valve leaflets mitral annular calcification no mitral stenosis mitral regurgitation structurally normal aortic valve nor aortic stenosis no aortic insufficiency structurally normal tricuspid valve no tricuspid stenosis mild tricuspid regurgitation estimated right ventricular systolic pressure is 35 mmHg pulmonic valve is normal and no pericardial effusion normal aortic root      Assessment/Plan:  I anticipate this patient will require at least two midnights for appropriate medical management, necessitating inpatient admission.    * Bacteremia   Assessment & Plan    Presents with bactermia staph epi, concern for infected Port  Dr. Ghotra consulted for eval, NPO for now   COnitnue with IV vancomycin   Will repeat blood  cultures  Needs ID consult in am   Echo from OSH with thickened mitral valve leaflets patient will need KRISSY, will order          Sepsis (HCC)   Assessment & Plan    This is sepsis (without associated acute organ dysfunction).   Due to infected line?   Continue with IV abx   Follow blood cultures repeat        GERD (gastroesophageal reflux disease)   Assessment & Plan    Resume pepcid        Tobacco abuse   Assessment & Plan    Greater than 10 minutes spent with patient smoking cessation counseling.  We discussed the risk of cardiopulmonary disease associated with smoking started nicotine patch as well.        Chronic hypoxemic respiratory failure (HCC)   Assessment & Plan    At baseline 5 l 02         COPD (chronic obstructive pulmonary disease) ([facility])   Assessment & Plan    Severe copd, with hx of alpha 1 anti trypsin def   Resume resp care protocol  Not in acute exacerbation   Symbicort, prednisone 10 mg daily home medication resume           Alpha-1-antitrypsin deficiency ([facility])   Assessment & Plan    Hx of with port access, for weekly infusion of prolastin           Paroxysmal atrial fibrillation ([facility])   Assessment & Plan    Paroxysmal atrial fibrillation on metoprolol and diltiazem will resume.  Has been on Xarelto however held due to possible surgical intervention        Infected venous access port   Assessment & Plan    Potential infected venous access port Dr. Gil has been consulted  NPO for possible surgery            VTE prophylaxis: SCD    I spent a total of 31 minutes of non face to face time performing additional research, reviewing old medical records, provided on going management by following up on labs, placing orders, discussing plan of care with other healthcare providers and nursing staff. Start time: 11:40 pm. End time: 1:11 am

## 2018-09-15 NOTE — ASSESSMENT & PLAN NOTE
- MediPort removed  - On IV Vanco; repeat Cxs NGTD  - ID to switch IV Vanc to PO Linezolid for 2 wks  - PICC line placement pending for Prolastin treatments

## 2018-09-15 NOTE — PROGRESS NOTES
Patient alert and oriented x 4.  Arrived on unit via [facility].  2 RNs skin assessment completed.  Port site with dressing. No bleeding noted.  Redness to buttocks, non blanchable.  Edema noted to bilateral lower extremities.  Complained of pain 2/10.  Bed in lowest and locked position. Call light   within reach.

## 2018-09-15 NOTE — PROGRESS NOTES
[facility] Hospitalist Progress Note    Date of Service: 9/15/2018    Chief Complaint  68 y.o. 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  Initially seen at an outside hospital in [place], where she was found to have fever, leukocytosis with bandemia, and evidence of pneumonia and sepsis.  She was treated with antibiotics for community-acquired pneumonia.  Blood cultures from 2 peripheral sites grew staph epidermidis.  She does have indwelling central line/PowerPort for several years for her Prolastin infusion for her alpha-1 antitrypsin deficiency.  Labs showed WBC of 21,500.  Dr. Gil of vascular surgery was consulted for evaluation, for surgical removal of the port.  Notably, echocardiogram from OSH with thickened mitral valve leaflets.      Interval Problem Update  9/15/2018 - no overnight events. Remains hemodynamically stable and afebrile. Stable on 5L O2 NC.  Repeat labs showed WBC of 19,700 no bandemia.  Sodium 134.    > I have personally seen and examined the patient today.  Comfortable.  At her baseline shortness of breath, has occasional cough but not much phlegm, and near baseline.  No nausea, vomiting, abdominal pain, diarrhea.      Consultants/Specialty  Vascular surgery  ID    Disposition  Monitor in the [facility]        ROS     Pertinent positives/negatives as mentioned above.     A complete review of systems was personally done by me. All other systems were negative.      Physical Exam  Laboratory/Imaging   Hemodynamics  Temp (24hrs), Av.4 °C (97.6 °F), Min:36.2 °C (97.1 °F), Max:36.8 °C (98.2 °F)   Temperature: 36.3 °C (97.4 °F)  Pulse  Av.3  Min: 75  Max: 82    Blood Pressure : 139/64      Respiratory      Respiration: 17, Pulse Oximetry: 96 %, O2 Daily Delivery Respiratory : Silicone Nasal Cannula     Work Of Breathing / Effort: Mild  RUL Breath  Sounds: Rhonchi, RML Breath Sounds: Diminished, RLL Breath Sounds: Diminished, OPAL Breath Sounds: Rhonchi, LLL Breath Sounds: Diminished    Fluids    Intake/Output Summary (Last 24 hours) at 09/15/18 0805  Last data filed at 09/15/18 0515   Gross per 24 hour   Intake                0 ml   Output                0 ml   Net                0 ml       Nutrition  Orders Placed This Encounter   Procedures   • Diet NPO     Standing Status:   Standing     Number of Occurrences:   1     Order Specific Question:   Restrict to:     Answer:   Strict [1]     Physical Exam   Constitutional: She is oriented to person, place, and time. She appears well-developed and well-nourished. No distress.   HENT:   Head: Normocephalic and atraumatic.   Mouth/Throat: No oropharyngeal exudate.   Eyes: Pupils are equal, round, and reactive to light. Conjunctivae are normal. No scleral icterus.   Neck: Normal range of motion. Neck supple.   Cardiovascular: Normal rate and regular rhythm.  Exam reveals no gallop and no friction rub.    No murmur heard.  Pulmonary/Chest: Effort normal. No respiratory distress. She has no wheezes. She has no rales.   Diminished air entry B/L bases, otherwise clear to auscultation  (+) tenderness over the port on the left upper chest   Abdominal: Soft. Bowel sounds are normal. She exhibits no distension. There is no tenderness. There is no rebound and no guarding.   Musculoskeletal: Normal range of motion. She exhibits no edema or tenderness.   Lymphadenopathy:     She has no cervical adenopathy.   Neurological: She is alert and oriented to person, place, and time. No cranial nerve deficit. Coordination normal.   Skin: Skin is warm and dry. No rash noted. She is not diaphoretic. No erythema. No pallor.   Psychiatric: She has a normal mood and affect. Her behavior is normal. Judgment and thought content normal.   Nursing note and vitals reviewed.           Recent Labs      09/15/18   0345   WBC  19.7*   RBC  4.93    HEMOGLOBIN  12.8   HEMATOCRIT  42.3   MCV  85.8   MCH  26.0*   MCHC  30.3*   RDW  47.2   PLATELETCT  279   MPV  9.8     Recent Labs      09/15/18   0345   SODIUM  134*   POTASSIUM  4.0   CHLORIDE  95*   CO2  36*   GLUCOSE  129*   BUN  18   CREATININE  0.40*   CALCIUM  8.8             Recent Labs      09/15/18   0345   TRIGLYCERIDE  93   HDL  72   LDL  113*          Assessment/Plan     * Staphylococcus epidermidis bacteremia, likely due to catheter related bloodstream infection/infected MediPort- (present on admission)   Assessment & Plan    -Continue IV vancomycin for now, suspect can be de-escalated.  I have consulted Dr. Wasserman of ID for further inputs.  -Repeat blood cultures ×2.  Needs repeat blood cultures every 48 hours, until negative.  -Echocardiogram from outside hospital showed thickened mitral valve leaflets.  I have spoken with Dr. Cuevas of cardiology, who will put her on the schedule for a KRISSY on Monday.  -Vascular surgery planning to remove port in the OR today.        Sepsis due to CAP, CRBSI (HCC)- (present on admission)   Assessment & Plan    -This is sepsis (without associated acute organ dysfunction).  Source is pneumonia, and infected venous port.  -Continue antibiotics.  Follow cultures.    -Monitor hemodynamics closely, with sepsis protocol and PRN IVF boluses.        Infected venous access port- (present on admission)   Assessment & Plan    -Unsuccessful attempt at removal at the outside hospital.  -Vascular surgery (Dr. Ghotra) plan for removal in the OR today.  -Continue vancomycin for now, can likely deescalate.  Repeat blood cultures. ID consulted.         CAP (community acquired pneumonia)- (present on admission)   Assessment & Plan    -Initial presentation at the outside hospital.  -Repeat chest x-ray to reevaluate.  Check procalcitonin.  -Continue respiratory support with bronchodilators per RT protocol, and oxygen supplements.        Acquired circulating anticoagulants (HCC)-  (present on admission)   Assessment & Plan    -Patient was on Pradaxa.  Continue to hold, until post procedure.         GERD (gastroesophageal reflux disease)- (present on admission)   Assessment & Plan    -Continue Pepcid.        Tobacco dependence- (present on admission)   Assessment & Plan    -Counseled on smoking cessation.        Chronic hypoxemic respiratory failure due to COPD (HCC)- (present on admission)   Assessment & Plan    -Stable at baseline oxygen requirement.  Continue bronchodilators per RT protocol, and oxygen supplements.        COPD (chronic obstructive pulmonary disease) due to alpha-1 antitrypsin deficiency (HCC)- (present on admission)   Assessment & Plan    -Not in acute exacerbation.  -Continue bronchodilators per RT protocol, and chronic prednisone at 10 mg daily.  Continue Symbicort in lieu of Advair while in the hospital.  Continue oxygen supplements.        Alpha-1-antitrypsin deficiency (HCC)- (present on admission)   Assessment & Plan    - with weekly infusion of prolastin.           Paroxysmal atrial fibrillation (HCC)- (present on admission)   Assessment & Plan    -Rate and rhythm controlled.  Continue home dose metoprolol and Cardizem.  Anticoagulation being held due to scheduled OR.          Quality-Core Measures   Reviewed items::  Labs reviewed, Medications reviewed and Radiology images reviewed  Little catheter::  No Little  DVT prophylaxis pharmacological::  Heparin  Ulcer Prophylaxis::  Yes  Antibiotics:  Treating active infection/contamination beyond 24 hours perioperative coverage

## 2018-09-15 NOTE — CONSULTS
DATE OF SERVICE:  09/15/2018    INFECTIOUS DISEASE CONSULTATION    REASON FOR CONSULT:  Infected port.    CONSULTING PHYSICIAN:  Max Romero MD    HISTORY OF PRESENT ILLNESS:  This is a very pleasant 68-year-old white female   who was originally admitted to the hospital on 09/14/2018 due to increasing   shortness of breath for 1 day prior to admission.  She is a transfer from   [facility].  When she presented there, she was noted to have fever,   leukocytosis, and sepsis initially thought to be due to pneumonia.  She was   started on antibiotics for community-acquired pneumonia and treated for COPD   exacerbation.  She subsequently grew Staph epidermidis from her blood   cultures.  Patient has an indwelling PowerPort for Prolastin infusions   for her alpha 1 antitrypsin deficiency.  Port removal was attempted, but was   found to be too complicated and due to significant concern for complications,   she was transferred to [facility] for vascular surgery evaluation   and for removal of the port.  She is currently on vancomycin, which she is   tolerating fairly well.  She has a some burning with the infusion,   but otherwise no complaints.  She is planned for explantation of the port   likely later today.  She is currently receiving vancomycin as stated above and   infectious disease is consulted for antibiotic recommendations and   management.    REVIEW OF SYSTEMS:  All other systems are reviewed and are negative.  Her   prior shortness of breath has improved and she is back to her baseline.    ALLERGIES:  She has no known drug allergies.    PAST MEDICAL HISTORY:  Alpha 1 antitrypsin deficiency, atrial fibrillation,   and chronic hypoxemic respiratory failure.    FAMILY HISTORY:  Patient denied other family members with lung disease.    SOCIAL HISTORY:  She is a smoker.  Denies alcohol or illicit drug use.  No   recent travel.  No sick contacts.    PHYSICAL EXAMINATION:  GENERAL:  She is a chronically  ill-appearing female, looks older than her   stated age.  VITAL SIGNS:  Temperature is 97.3, blood pressure 144/57, pulse 91,   saturations 96% on 5 L nasal cannula.  She weighs 70 kilos.  HEENT:  Normocephalic, atraumatic.  Pupils are equal, round, and reactive to   light.  Extraocular movements intact.  Oropharynx is clear.  She has fair   dentition.  NECK:  Supple.  CARDIOVASCULAR:  Irregular rate and rhythm.  CHEST:  She has decreased breath sounds bilaterally, left-side port.  There is   surgical site with sutures.  The superior portion of the port has been   removed; however, she states the rest of it remains in place.  ABDOMEN:  Soft, nontender.  EXTREMITIES:  Show no cyanosis, clubbing, or edema.  NEUROLOGIC:  She is awake, alert, and oriented.  Speech is without dysarthria.    Cranial nerves were intact.    LABORATORY DATA:  Prior white blood cell count was 21, now 19.7, H and H of   12.8 and 42, and platelets 279.  Sodium 134, potassium 4, chloride 95,   bicarbonate 36, glucose 129, BUN 18, creatinine 0.4, AST 11, ALT 24, alkaline   phosphatase 53, and albumin 3.3.    IMAGING:  Chest x-ray showed a left subclavian central line with tip in the   superior vena cava, mild interstitial disease.    ASSESSMENT AND PLAN:  1.  Staphylococcus epidermidis sepsis due to infected port.  She was seen and   evaluated by vascular surgery.  She is planned for explantation of the port   likely today.  She is currently afebrile, but still has significant   leukocytosis.  Repeat blood cultures have been ordered.  Agree with continuing   vancomycin for the time being, pending sensitivities of the Staphylococcus   epidermidis.  Transthoracic echo from outside facility showed thickened mitral   valve leaflets.  Do recommend KRISSY to verify there is no endocarditis.  If her   KRISSY is negative, anticipate a 2-week course of IV antibiotic therapy from the   date of removal of the port.  2.  She has alpha 1 antitrypsin deficiency with  chronic hypoxemic respiratory   failure.  She normally gets infusions for treatment.  Anticipate new IV access   can be obtained preferably after completion of current IV antibiotic therapy.  3.  Further recommendations per culture results and clinical course.  4.  Discussed with internal medicine.    Thank you and we will follow with you.       ____________________________________     MD NIKOLAY SIDDIQI / PASHA    DD:  09/15/2018 13:04:23  DT:  09/15/2018 13:46:30    D#:  [id number]  Job#:  [id number]

## 2018-09-15 NOTE — CARE PLAN
Problem: Safety  Goal: Will remain free from falls  Outcome: PROGRESSING AS EXPECTED  Treaded socks on. Call light within reach. Bed in lowest and locked position.

## 2018-09-15 NOTE — CONSULTS
DATE OF CONSULTATION: 9/15/2018     REFERRING PHYSICIAN: MD Enoch.     CONSULTING PHYSICIAN: Vidal Ghotra M.D.      REASON FOR CONSULTATION:       HISTORY OF PRESENT ILLNESS: The patient is a 68-year-old female who has chronic lung disease from alpha-1 antitrypsin deficiency.  The patient has a port in place for the past 4 years which was being used for infusions.  Down in [place] the patient was diagnosed with a port infection and an attempt was made to remove the port.  The catheter itself broke off during the attempt to remove the catheter.  Patient was transferred here for catheter removal.    PAST MEDICAL HISTORY:       PAST SURGICAL HISTORY: patient denies any surgical history     ALLERGIES: No Known Allergies     CURRENT MEDICATIONS:   Home Medications     Reviewed by Josefina Claire R.N. (Registered Nurse) on 09/14/18 at 2310  Med List Status: Partial   Medication Last Dose Status   DILTIAZem CD (CARDIZEM CD) 360 MG CAPSULE SR 24 HR 9/14/2018 Active   fluticasone-salmeterol (ADVAIR) 500-50 MCG/DOSE AEROSOL POWDER, BREATH ACTIVATED  Active   furosemide (LASIX) 20 MG Tab 9/14/2018 Active   guaiFENesin LA (MUCINEX) 600 MG TABLET SR 12 HR 9/14/2018 Active   metoprolol SR (TOPROL XL) 100 MG TABLET SR 24 HR 9/14/2018 Active   NON SPECIFIED  Active   predniSONE (DELTASONE) 10 MG Tab 9/14/2018 Active   raNITidine (ZANTAC) 150 MG Tab 9/14/2018 Active   tramadol (ULTRAM) 50 MG Tab 9/14/2018 Active                FAMILY HISTORY: No family history on file.     SOCIAL HISTORY:   Social History     Social History Main Topics   • Smoking status: Not on file   • Smokeless tobacco: Not on file   • Alcohol use Not on file   • Drug use: Unknown   • Sexual activity: Not on file       Review of Systems:      Constitutional: Denies fevers, Denies weight changes  Eyes: Denies changes in vision, no eye pain  Ears/Nose/Throat/Mouth: Denies nasal congestion or sore throat   Cardiovascular: Denies chest pain or  palpitations.  Respiratory: Denies shortness of breath, cough, and wheezing.  Gastrointestinal/Hepatic: Denies abdominal pain, nausea, vomiting, diarrhea, constipation or GI bleeding   Genitourinary: Denies dysuria or frequency  Musculoskeletal/Rheum: Denies  joint pain and swelling, no edema  Skin: Denies rash  Neurological: Denies headache, confusion, memory loss or focal weakness/parasthesias  Psychiatric: denies mood disorder   Endocrine: Sharon thyroid problems  Heme/Oncology/Lymph Nodes: Denies enlarged lymph nodes, denies brusing or known bleeding disorder  All other systems were reviewed and are negative (AMA/CMS criteria)                  PHYSICAL EXAMINATION:       Constitutional:   Deconditioned  HENMT:  Normocephalic, Atraumatic, Oropharynx moist mucous membranes, No oral exudates, Nose normal.  No thyromegaly.  Eyes:  EOMI, Conjunctiva normal, No discharge.  Neck:  Normal range of motion, No cervical tenderness,  no JVD.  Cardiovascular:  Normal heart rate, Normal rhythm, No murmurs, No rubs, No gallops.   Extremitites with intact distal pulses, no cyanosis, or edema.  Lungs:  Diminished breath sounds, coarse  Abdomen: Bowel sounds normal, Soft, No tenderness, No guarding, No rebound, No masses, No hepatosplenomegaly.  Skin: Warm, Dry, No erythema, No rash, no induration.  Neurologic: Alert & oriented x 3, No focal deficits noted, cranial nerves II through X are grossly intact.  Psychiatric: Affect normal, Judgment normal, Mood normal.        LABORATORY VALUES:   Recent Labs      09/15/18   0345   WBC  19.7*   RBC  4.93   HEMOGLOBIN  12.8   HEMATOCRIT  42.3   MCV  85.8   MCH  26.0*   MCHC  30.3*   RDW  47.2   PLATELETCT  279   MPV  9.8     Recent Labs      09/15/18   0345   SODIUM  134*   POTASSIUM  4.0   CHLORIDE  95*   CO2  36*   GLUCOSE  129*   BUN  18   CREATININE  0.40*   CALCIUM  8.8     Recent Labs      09/15/18   0345   ASTSGOT  11*   ALTSGPT  24   TBILIRUBIN  0.4   ALKPHOSPHAT  53   GLOBULIN   2.3            IMAGING:   TRANSESOPHAGEAL ECHO W/ CONT    (Results Pending)   DX-CHEST-2 VIEWS    (Results Pending)       IMPRESSION AND PLAN:     Active Hospital Problems    Diagnosis   • Bacteremia [R78.81]   • Infected venous access port [T80.219A]   • Paroxysmal atrial fibrillation (HCC) [I48.0]   • Alpha-1-antitrypsin deficiency (HCC) [E88.01]   • COPD (chronic obstructive pulmonary disease) (HCC) [J44.9]   • Chronic hypoxemic respiratory failure (HCC) [J96.11]   • Tobacco abuse [Z72.0]   • GERD (gastroesophageal reflux disease) [K21.9]   • Sepsis (HCC) [A41.9]     Plan to take the patient to the operating room today to retrieve the catheter.  Unfortunately we will have to make A's infraclavicular incision and dissected from the subclavian vein.  Risks benefits and alternatives discussed.  ____________________________________   Vidal Ghotra M.D.          DD: 9/15/2018   DT: 9:22 AM

## 2018-09-15 NOTE — OP REPORT
DATE OF SERVICE:  09/15/2018    PREOPERATIVE DIAGNOSIS:  Retained subclavian catheter left upper extremity   requiring clavicle osteotomy for removal.    POSTOPERATIVE DIAGNOSIS:  Retained subclavian catheter left upper extremity   requiring clavicle osteotomy for removal.    PROCEDURE PERFORMED:  Repair of left clavicle osteotomy with internal   fixation.    SURGEON:  Delonte Bustamante MD    CO-SURGEON:  Vidal Ghotra MD    ANESTHESIA:  General.    IMPLANTS:  A 6-hole Synthes superior clavicle locking plate with combination   of locking and nonlocking 3.5 mm screws.    ANESTHESIOLOGIST:  Mima Herndon MD    INDICATION FOR PROCEDURE:  I was consulted intraoperatively by Dr. Ghotra to   assist with repair of left clavicle osteotomy that was required for his   exposure to the subclavian vessels during his portion of the procedure.    DESCRIPTION OF PROCEDURE:  The patient's left clavicle and osteotomy had   already been performed by Dr. Ghotra and exposure of the adjacent subclavian   vessels as well.  Once this portion of the procedure was completed, used   reduction forceps and a 6-hole Synthes superior locking plate and fixed it to   the medial segment with unicortical nonlocking screw and then used clamps to   reduce the lateral segment with good bony contact and using dynamic   compression to the plate was able to achieve some compression at the fracture   site.  I then placed 2 more unicortical locking screws in the medial segment   and a bicortical nonlocking screw in the medial segment and a combination of   locking and nonlocking screws in the lateral segment.  Fluoroscopic imaging   confirmed acceptable position of the implants and acceptable alignment of the   osteotomy.  This concluded my portion of the procedure.  Dr. Ghotra and his   assistant performed routine wound closure.    PLAN:  1.  Patient will be readmitted postop.  2.  I feel that he should be limited weightbearing left upper extremity  with a   sling for comfort, but can perform shoulder range of motion as tolerated and   otherwise we will defer to Dr. Ghotra's restrictions as far as the remainder   of restrictions are concerned.       ____________________________________     MD LELE Amador / PASHA    DD:  09/15/2018 15:05:24  DT:  09/15/2018 15:20:48    D#:  [id number]  Job#:  [id number]

## 2018-09-15 NOTE — PROGRESS NOTES
"Pharmacy Kinetics 68 y.o. female on vancomycin day # 1 9/15/2018    Currently on Vancomycin 1800 mg iv loading dose followed by Vancomycin 1400 mg q24h    Indication for Treatment: r/o bacteremia    Pertinent history per medical record: Admitted on 2018 for line infection.  Patient presented to the [facility] facility with shortness of breath and evidence of pneumonia.  Later she had blood cultures grow staph epi and suspicion of a line infection.  They were transferred here for vascular consultation.       Other antibiotics: None    Allergies: Patient has no known allergies.     List concerns for renal function: BUN/SCr > 20:1    Pertinent cultures to date:     Blood culture -- staph epidermidis (at [facility])    No results for input(s): WBC, NEUTSPOLYS, BANDSSTABS in the last 72 hours.  No results for input(s): BUN, CREATININE, ALBUMIN in the last 72 hours.  No results for input(s): VANCOTROUGH, VANCOPEAK, VANCORANDOM in the last 72 hours.  Intake/Output Summary (Last 24 hours) at 09/15/18 0401  Last data filed at 18 2350   Gross per 24 hour   Intake                0 ml   Output                0 ml   Net                0 ml      Blood pressure 139/60, pulse 75, temperature 36.2 °C (97.1 °F), resp. rate 16, height 1.6 m (5' 3\"), weight 70.2 kg (154 lb 12.2 oz), SpO2 95 %, not currently breastfeeding. Temp (24hrs), Av.5 °C (97.7 °F), Min:36.2 °C (97.1 °F), Max:36.8 °C (98.2 °F)      A/P   1. Vancomycin dose change: New start  2. Next vancomycin level: Prior to 4th dose (not ordered)  3. Goal trough: 16-20 mcg/mL  4. Comments: Patient has some risk for accumulation as listed above.  Recommend DC vancomycin if MRSA can be ruled out.  Pharmacy will continue to follow.      Angel Ruiz, PharmD      "

## 2018-09-15 NOTE — OP REPORT
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    ASSISTANT - PAULINO Escobar    TYPE OF ANESTHESIA - General     ANESTHESIOLOGIST  -Dr. Regalado      SPECIMENS -catheter for culture    INDICATIONS - 68 y.o. who was transferred from [facility] after the patient was diagnosed with an infected port from the left subclavian vein.  The surgeon down in [place] attempted to remove the port but the catheter broke off.  Patient was transferred here for retrieval.  The patient has severe pulmonary disease is on 5 L of home oxygen.  Patient's white blood cell count was 20,000 suspected from catheter related infection.      PROCEDURE IN DETAIL -patient taken to the operating suite placed in the supine position after adequate anesthesia the patient's left neck anterior chest wall were prepped and draped in sterile fashion.  Patient's previous incision from the previous surgeon was reopened.  The catheter was identified within the base of that incision.  The incision was then carried medially to the infraclavicular location.  The catheter was dissected from the surrounding tissue up to where it entered into the subclavian vein.  An attempt to retrieve the catheter the catheter once again ensured off.  Unfortunately the catheter broke in the subclavian vein beneath the clavicle.  The incision was then carried further medially and attempt was made to gain access to the subclavian vein in the supraclavicular location but was unsuccessful.  I then used a assault to transect the clavicle and the medial aspect.  With proper retraction of the transected clavicle the  subclavian vein was further dissected more medially to the junction with the innominate vein.  The vessel was circumferentially dissected and isolated.  After the vein was occluded proximally and distally a longitudinal venotomy incision was made to with some difficulty under fluoroscopic guidance I was able to grasp the catheter with a hemostat.  The catheter itself was circumferentially embedded in calcium.  With some difficulty the catheter was successfully retrieved from the innominate vein and passed off for culture.  After retrieval of the catheter the vein was repaired using a running 6-0 Prolene suture.  I then consulted Dr. Bustamante orthopedic surgery to repair the transected clavicle.  Dr. Bustamante scrubbed into the case and successfully plate of the clavicle.  The area was irrigated hemostasis was assured 7 fluted Maged-Frances drain was left within the incision brought out through a separate stab incision and secured to the skin.  3-0 Vicryl sutures were used to reapproximate the subcutaneous tissue and 4 oh strata fix was then used to reapproximate the skin incision.  Benzoin Steri-Strips sterile dressings were applied.  Due to the length of anesthesia and the patient's severe pulmonary disease it was felt to be safe to transfer the patient to the intensive care unit intubated and have the patient recovered by pulmonary medicine.    Vidal Ghotra M.D.

## 2018-09-16 ENCOUNTER — APPOINTMENT (OUTPATIENT)
Dept: RADIOLOGY | Facility: MEDICAL CENTER | Age: 68
DRG: 270 | End: 2018-09-16
Attending: INTERNAL MEDICINE
Payer: MEDICARE

## 2018-09-16 ENCOUNTER — APPOINTMENT (OUTPATIENT)
Dept: RADIOLOGY | Facility: MEDICAL CENTER | Age: 68
DRG: 270 | End: 2018-09-16
Attending: HOSPITALIST
Payer: MEDICARE

## 2018-09-16 PROBLEM — R06.03 ACUTE RESPIRATORY DISTRESS: Status: ACTIVE | Noted: 2018-09-16

## 2018-09-16 LAB
ANION GAP SERPL CALC-SCNC: 6 MMOL/L (ref 0–11.9)
BASE EXCESS BLDA CALC-SCNC: 9 MMOL/L (ref -4–3)
BASOPHILS # BLD AUTO: 0.1 % (ref 0–1.8)
BASOPHILS # BLD: 0.02 K/UL (ref 0–0.12)
BODY TEMPERATURE: ABNORMAL CENTIGRADE
BUN SERPL-MCNC: 12 MG/DL (ref 8–22)
CALCIUM SERPL-MCNC: 8.3 MG/DL (ref 8.5–10.5)
CHLORIDE SERPL-SCNC: 99 MMOL/L (ref 96–112)
CO2 SERPL-SCNC: 33 MMOL/L (ref 20–33)
CREAT SERPL-MCNC: 0.38 MG/DL (ref 0.5–1.4)
EOSINOPHIL # BLD AUTO: 0 K/UL (ref 0–0.51)
EOSINOPHIL NFR BLD: 0 % (ref 0–6.9)
ERYTHROCYTE [DISTWIDTH] IN BLOOD BY AUTOMATED COUNT: 47.4 FL (ref 35.9–50)
GLUCOSE SERPL-MCNC: 177 MG/DL (ref 65–99)
GRAM STN SPEC: NORMAL
HCO3 BLDA-SCNC: 34 MMOL/L (ref 17–25)
HCT VFR BLD AUTO: 40.4 % (ref 37–47)
HGB BLD-MCNC: 12.7 G/DL (ref 12–16)
IMM GRANULOCYTES # BLD AUTO: 0.14 K/UL (ref 0–0.11)
IMM GRANULOCYTES NFR BLD AUTO: 0.8 % (ref 0–0.9)
LYMPHOCYTES # BLD AUTO: 0.85 K/UL (ref 1–4.8)
LYMPHOCYTES NFR BLD: 4.6 % (ref 22–41)
MAGNESIUM SERPL-MCNC: 2.2 MG/DL (ref 1.5–2.5)
MCH RBC QN AUTO: 27.1 PG (ref 27–33)
MCHC RBC AUTO-ENTMCNC: 31.4 G/DL (ref 33.6–35)
MCV RBC AUTO: 86.3 FL (ref 81.4–97.8)
MONOCYTES # BLD AUTO: 0.41 K/UL (ref 0–0.85)
MONOCYTES NFR BLD AUTO: 2.2 % (ref 0–13.4)
NEUTROPHILS # BLD AUTO: 17.06 K/UL (ref 2–7.15)
NEUTROPHILS NFR BLD: 92.3 % (ref 44–72)
NRBC # BLD AUTO: 0 K/UL
NRBC BLD-RTO: 0 /100 WBC
PCO2 BLDA: 47.2 MMHG (ref 26–37)
PH BLDA: 7.47 [PH] (ref 7.4–7.5)
PHOSPHATE SERPL-MCNC: 4.2 MG/DL (ref 2.5–4.5)
PLATELET # BLD AUTO: 268 K/UL (ref 164–446)
PMV BLD AUTO: 9.6 FL (ref 9–12.9)
PO2 BLDA: 52.4 MMHG (ref 64–87)
POTASSIUM SERPL-SCNC: 4.4 MMOL/L (ref 3.6–5.5)
PROCALCITONIN SERPL-MCNC: <0.05 NG/ML
RBC # BLD AUTO: 4.68 M/UL (ref 4.2–5.4)
RHODAMINE-AURAMINE STN SPEC: NORMAL
SAO2 % BLDA: 89.3 % (ref 93–99)
SIGNIFICANT IND 70042: NORMAL
SIGNIFICANT IND 70042: NORMAL
SITE SITE: NORMAL
SITE SITE: NORMAL
SODIUM SERPL-SCNC: 138 MMOL/L (ref 135–145)
SOURCE SOURCE: NORMAL
SOURCE SOURCE: NORMAL
WBC # BLD AUTO: 18.5 K/UL (ref 4.8–10.8)

## 2018-09-16 PROCEDURE — 99233 SBSQ HOSP IP/OBS HIGH 50: CPT | Performed by: INTERNAL MEDICINE

## 2018-09-16 PROCEDURE — 700111 HCHG RX REV CODE 636 W/ 250 OVERRIDE (IP): Performed by: INTERNAL MEDICINE

## 2018-09-16 PROCEDURE — 94667 MNPJ CHEST WALL 1ST: CPT

## 2018-09-16 PROCEDURE — A9270 NON-COVERED ITEM OR SERVICE: HCPCS | Performed by: SURGERY

## 2018-09-16 PROCEDURE — 94668 MNPJ CHEST WALL SBSQ: CPT

## 2018-09-16 PROCEDURE — 770022 HCHG ROOM/CARE - ICU (200)

## 2018-09-16 PROCEDURE — 700111 HCHG RX REV CODE 636 W/ 250 OVERRIDE (IP): Performed by: HOSPITALIST

## 2018-09-16 PROCEDURE — 94640 AIRWAY INHALATION TREATMENT: CPT

## 2018-09-16 PROCEDURE — 84100 ASSAY OF PHOSPHORUS: CPT

## 2018-09-16 PROCEDURE — 83735 ASSAY OF MAGNESIUM: CPT

## 2018-09-16 PROCEDURE — 82803 BLOOD GASES ANY COMBINATION: CPT

## 2018-09-16 PROCEDURE — 700102 HCHG RX REV CODE 250 W/ 637 OVERRIDE(OP): Performed by: INTERNAL MEDICINE

## 2018-09-16 PROCEDURE — 99232 SBSQ HOSP IP/OBS MODERATE 35: CPT | Performed by: INTERNAL MEDICINE

## 2018-09-16 PROCEDURE — 700102 HCHG RX REV CODE 250 W/ 637 OVERRIDE(OP): Performed by: SURGERY

## 2018-09-16 PROCEDURE — A9270 NON-COVERED ITEM OR SERVICE: HCPCS | Performed by: INTERNAL MEDICINE

## 2018-09-16 PROCEDURE — 71045 X-RAY EXAM CHEST 1 VIEW: CPT

## 2018-09-16 PROCEDURE — 99233 SBSQ HOSP IP/OBS HIGH 50: CPT | Performed by: HOSPITALIST

## 2018-09-16 PROCEDURE — 80048 BASIC METABOLIC PNL TOTAL CA: CPT

## 2018-09-16 PROCEDURE — 85025 COMPLETE CBC W/AUTO DIFF WBC: CPT

## 2018-09-16 PROCEDURE — 700101 HCHG RX REV CODE 250: Performed by: INTERNAL MEDICINE

## 2018-09-16 PROCEDURE — A9270 NON-COVERED ITEM OR SERVICE: HCPCS | Performed by: HOSPITALIST

## 2018-09-16 PROCEDURE — 700105 HCHG RX REV CODE 258: Performed by: HOSPITALIST

## 2018-09-16 PROCEDURE — 700102 HCHG RX REV CODE 250 W/ 637 OVERRIDE(OP): Performed by: HOSPITALIST

## 2018-09-16 PROCEDURE — 84145 PROCALCITONIN (PCT): CPT

## 2018-09-16 RX ORDER — ALBUTEROL SULFATE 90 UG/1
2 AEROSOL, METERED RESPIRATORY (INHALATION) EVERY 4 HOURS PRN
Status: DISCONTINUED | OUTPATIENT
Start: 2018-09-16 | End: 2018-09-21

## 2018-09-16 RX ORDER — ACETAMINOPHEN 325 MG/1
650 TABLET ORAL EVERY 6 HOURS PRN
Status: DISCONTINUED | OUTPATIENT
Start: 2018-09-16 | End: 2018-09-22 | Stop reason: HOSPADM

## 2018-09-16 RX ORDER — FAMOTIDINE 20 MG/1
20 TABLET, FILM COATED ORAL EVERY 12 HOURS
Status: DISCONTINUED | OUTPATIENT
Start: 2018-09-16 | End: 2018-09-16

## 2018-09-16 RX ORDER — DILTIAZEM HYDROCHLORIDE 360 MG/1
360 CAPSULE, EXTENDED RELEASE ORAL DAILY
Status: DISCONTINUED | OUTPATIENT
Start: 2018-09-17 | End: 2018-09-22 | Stop reason: HOSPADM

## 2018-09-16 RX ORDER — FUROSEMIDE 10 MG/ML
20 INJECTION INTRAMUSCULAR; INTRAVENOUS
Status: DISCONTINUED | OUTPATIENT
Start: 2018-09-16 | End: 2018-09-18

## 2018-09-16 RX ORDER — IPRATROPIUM BROMIDE AND ALBUTEROL SULFATE 2.5; .5 MG/3ML; MG/3ML
3 SOLUTION RESPIRATORY (INHALATION)
Status: DISCONTINUED | OUTPATIENT
Start: 2018-09-16 | End: 2018-09-21

## 2018-09-16 RX ORDER — DILTIAZEM HCL 90 MG
90 TABLET ORAL EVERY 6 HOURS
Status: COMPLETED | OUTPATIENT
Start: 2018-09-16 | End: 2018-09-16

## 2018-09-16 RX ADMIN — HEPARIN SODIUM 5000 UNITS: 5000 INJECTION, SOLUTION INTRAVENOUS; SUBCUTANEOUS at 13:50

## 2018-09-16 RX ADMIN — DILTIAZEM HYDROCHLORIDE 90 MG: 90 TABLET, FILM COATED ORAL at 00:25

## 2018-09-16 RX ADMIN — BUDESONIDE AND FORMOTEROL FUMARATE DIHYDRATE 2 PUFF: 160; 4.5 AEROSOL RESPIRATORY (INHALATION) at 18:23

## 2018-09-16 RX ADMIN — IPRATROPIUM BROMIDE AND ALBUTEROL SULFATE 3 ML: .5; 3 SOLUTION RESPIRATORY (INHALATION) at 16:24

## 2018-09-16 RX ADMIN — STANDARDIZED SENNA CONCENTRATE AND DOCUSATE SODIUM 2 TABLET: 8.6; 5 TABLET ORAL at 05:43

## 2018-09-16 RX ADMIN — IPRATROPIUM BROMIDE AND ALBUTEROL SULFATE 3 ML: .5; 3 SOLUTION RESPIRATORY (INHALATION) at 10:47

## 2018-09-16 RX ADMIN — GUAIFENESIN 200 MG: 100 SOLUTION ORAL at 18:21

## 2018-09-16 RX ADMIN — METOPROLOL TARTRATE 100 MG: 25 TABLET, FILM COATED ORAL at 18:21

## 2018-09-16 RX ADMIN — IPRATROPIUM BROMIDE AND ALBUTEROL SULFATE 3 ML: .5; 3 SOLUTION RESPIRATORY (INHALATION) at 19:56

## 2018-09-16 RX ADMIN — HYDROMORPHONE HYDROCHLORIDE 0.5 MG: 2 INJECTION INTRAMUSCULAR; INTRAVENOUS; SUBCUTANEOUS at 16:20

## 2018-09-16 RX ADMIN — HEPARIN SODIUM 5000 UNITS: 5000 INJECTION, SOLUTION INTRAVENOUS; SUBCUTANEOUS at 21:08

## 2018-09-16 RX ADMIN — METHYLPREDNISOLONE SODIUM SUCCINATE 62.5 MG: 125 INJECTION, POWDER, FOR SOLUTION INTRAMUSCULAR; INTRAVENOUS at 21:08

## 2018-09-16 RX ADMIN — TRAMADOL HYDROCHLORIDE 50 MG: 50 TABLET, COATED ORAL at 18:36

## 2018-09-16 RX ADMIN — ALBUTEROL SULFATE 2 PUFF: 90 AEROSOL, METERED RESPIRATORY (INHALATION) at 12:52

## 2018-09-16 RX ADMIN — METHYLPREDNISOLONE SODIUM SUCCINATE 62.5 MG: 125 INJECTION, POWDER, FOR SOLUTION INTRAMUSCULAR; INTRAVENOUS at 05:43

## 2018-09-16 RX ADMIN — GUAIFENESIN 200 MG: 100 SOLUTION ORAL at 21:08

## 2018-09-16 RX ADMIN — FUROSEMIDE 20 MG: 10 INJECTION, SOLUTION INTRAMUSCULAR; INTRAVENOUS at 16:21

## 2018-09-16 RX ADMIN — VANCOMYCIN HYDROCHLORIDE 1400 MG: 100 INJECTION, POWDER, LYOPHILIZED, FOR SOLUTION INTRAVENOUS at 00:25

## 2018-09-16 RX ADMIN — GUAIFENESIN 200 MG: 100 SOLUTION ORAL at 08:45

## 2018-09-16 RX ADMIN — METOPROLOL TARTRATE 100 MG: 25 TABLET, FILM COATED ORAL at 05:43

## 2018-09-16 RX ADMIN — FAMOTIDINE 20 MG: 20 TABLET, FILM COATED ORAL at 05:43

## 2018-09-16 RX ADMIN — GUAIFENESIN 200 MG: 100 SOLUTION ORAL at 05:44

## 2018-09-16 RX ADMIN — ACETAMINOPHEN 650 MG: 325 TABLET, FILM COATED ORAL at 16:42

## 2018-09-16 RX ADMIN — IPRATROPIUM BROMIDE AND ALBUTEROL SULFATE 3 ML: .5; 3 SOLUTION RESPIRATORY (INHALATION) at 22:19

## 2018-09-16 RX ADMIN — GUAIFENESIN 200 MG: 100 SOLUTION ORAL at 13:50

## 2018-09-16 RX ADMIN — DILTIAZEM HYDROCHLORIDE 90 MG: 90 TABLET, FILM COATED ORAL at 11:45

## 2018-09-16 RX ADMIN — BUDESONIDE AND FORMOTEROL FUMARATE DIHYDRATE 2 PUFF: 160; 4.5 AEROSOL RESPIRATORY (INHALATION) at 05:44

## 2018-09-16 RX ADMIN — GUAIFENESIN 200 MG: 100 SOLUTION ORAL at 00:24

## 2018-09-16 RX ADMIN — DILTIAZEM HYDROCHLORIDE 90 MG: 90 TABLET, FILM COATED ORAL at 05:43

## 2018-09-16 RX ADMIN — HEPARIN SODIUM 5000 UNITS: 5000 INJECTION, SOLUTION INTRAVENOUS; SUBCUTANEOUS at 05:43

## 2018-09-16 RX ADMIN — MAGNESIUM HYDROXIDE 30 ML: 400 SUSPENSION ORAL at 13:49

## 2018-09-16 RX ADMIN — HYDROMORPHONE HYDROCHLORIDE 0.5 MG: 2 INJECTION INTRAMUSCULAR; INTRAVENOUS; SUBCUTANEOUS at 08:44

## 2018-09-16 RX ADMIN — DILTIAZEM HYDROCHLORIDE 90 MG: 90 TABLET, FILM COATED ORAL at 18:21

## 2018-09-16 RX ADMIN — METHYLPREDNISOLONE SODIUM SUCCINATE 62.5 MG: 125 INJECTION, POWDER, FOR SOLUTION INTRAMUSCULAR; INTRAVENOUS at 13:56

## 2018-09-16 ASSESSMENT — ENCOUNTER SYMPTOMS
VOMITING: 0
WEIGHT LOSS: 0
SHORTNESS OF BREATH: 0
CHILLS: 0
PALPITATIONS: 0
FEVER: 0
WEAKNESS: 0
SORE THROAT: 0
BLURRED VISION: 0
NECK PAIN: 0
FOCAL WEAKNESS: 0
SPEECH CHANGE: 0
ABDOMINAL PAIN: 0
HEADACHES: 0
DIARRHEA: 0
COUGH: 0
NAUSEA: 0
NERVOUS/ANXIOUS: 0

## 2018-09-16 ASSESSMENT — PAIN SCALES - GENERAL
PAINLEVEL_OUTOF10: 0
PAINLEVEL_OUTOF10: 5
PAINLEVEL_OUTOF10: 0
PAINLEVEL_OUTOF10: 0
PAINLEVEL_OUTOF10: 5
PAINLEVEL_OUTOF10: 0
PAINLEVEL_OUTOF10: 0
PAINLEVEL_OUTOF10: 3
PAINLEVEL_OUTOF10: 0
PAINLEVEL_OUTOF10: 4

## 2018-09-16 NOTE — FLOWSHEET NOTE
09/15/18 2043   Events/Summary/Plan   Events/Summary/Plan weaning trial   General Vent Information   Pulse Oximetry 88 %   Heart Rate (Monitored) (!) 131   Weaning Parameters   RR (bpm) 16   #FVC / Vital Capacity (liters)  1.2   NIF (cm H2O)  -25   Rapid Shallow Breathing Index (RR/VT) 30   Spontaneous VE 6.3   Spontaneous    Weaning Trial   Weaning Trial Stopped due to: Pt weaned for 1 hour and returned to rest settings per protocol  (per dr anguiano to wean for 40 mins)   Length of Weaning Trial (Hours) 1   Vent Weaning Smart Text completed? Yes   Respiratory WDL   Respiratory (WDL) X   Chest Exam   Work Of Breathing / Effort Vented   Breath Sounds   Pre/Post Intervention Pre Intervention Assessment   RUL Breath Sounds Clear   RML Breath Sounds Clear   RLL Breath Sounds Diminished   OPAL Breath Sounds Clear   LLL Breath Sounds Diminished   Secretions   Cough Productive   How Sputum Obtained Endotracheal   Sputum Amount Small   Sputum Color White;Brown   Sputum Consistency Thick;Thin   Suction Frequency Suctioned Once or Twice Per Encounter

## 2018-09-16 NOTE — PROGRESS NOTES
Pt arrive to floor via bed after report from Annita in RICU. Pt A/O x 4, CMS+, REMY, HR RRR, LS diminished BLL. NO c/o pain. Dressings to L clavicle CDI. Family at bedside.     SCDs and  on, pt oriented to unit routine and safety education provided. Call light within reach, rounding in place.

## 2018-09-16 NOTE — RESPIRATORY CARE
Respiratory Rapid Response Note    Symptoms  Low SPo2    Breath Sounds    RUL Breath Sounds: Diminished   RML Breath Sounds: Diminished  RLL Breath Sounds: Diminished   OPAL Breath Sounds: Diminished   LLL Breath Sounds: Diminished     Cough: Dry;Non Productive     ABG Results drawn by LAB     FiO2%: 100 % (09/16/18 1454)  O2 (LPM): 60 (09/16/18 1454)  O2 Daily Delivery Respiratory : Highflow Nasal Cannula (09/16/18 1454)    Transferred to ICU no, patient placed on HHFNC.

## 2018-09-16 NOTE — CODE DOCUMENTATION
RR called for increased O2 requirements.  Pt not in any visible distress but requiring 15L NRB to maintain O2sat 88%.  Upon entering in room pt O2 sat 84% on 15Loxymask.

## 2018-09-16 NOTE — PROGRESS NOTES
RT Adarsh at bedside. Pt states she needs rescue inhaler that is not ordered here but that she takes at home. RT and pt discussing medications. RT states she will order appropriate medications.    Pt oxygen sat 88% on 11 L per mask. RT aware. Pt asymptomatic: no c/o dizziness, pink coloration, able to talk w/o SOB, pt states that her oxygen does not feel low.     RT at bedside with rescue inhaler.

## 2018-09-16 NOTE — PROGRESS NOTES
Paged MD to ask if cor track should be placed or if diltiazem and metoprolol doses could be changed to IV waiting on call back.

## 2018-09-16 NOTE — PROGRESS NOTES
Vascular Surgery     Awake alert  Extubated   Feel well   Bandage dry     MILDRED minimal     S/P removal retained infected central venous catheter     Doing well     Follow

## 2018-09-16 NOTE — PROGRESS NOTES
Pt only had 15 ml output since arrival to ICU. Bladder scan showed 0 ml in bladder. Dr Gonda notified, orders placed.

## 2018-09-16 NOTE — PROGRESS NOTES
2 RN skin check with BOBBY Alatorre.     Bruising BUE, bruising posterior L side back. L clavicle surgical incision with steristrips, intact. Gauze and tape dressing over port removal site under L clavicle CDI.    Redness to sacrum, blanching. Small dark spot that appears to be a blood blister plantar surface L foot.

## 2018-09-16 NOTE — PROGRESS NOTES
Patient extubated and put on 5L NC, swabs given to patient at this time patient is A&Ox4 denying pain at this time. Restraints removed and patient educated on call light. Will do swallow eval in 1 hour.

## 2018-09-16 NOTE — PROGRESS NOTES
Infectious Disease Progress Note    Author: Feliberto Kelly M.D. Date & Time of service: 2018  7:13 AM    Chief Complaint:  Follow-up of staph epidermidis bacteremia    Interval History:   afebrile, white count down some from 19.5 K to 18.5 K.  Repeat blood cultures are no growth to date.  Extubated this morning, patient feels okay, minimal pain breathing fine.    Labs Reviewed, Medications Reviewed and Radiology Reviewed.    Review of Systems:  Review of Systems   Constitutional: Negative for chills, fever and weight loss.   Respiratory: Negative for cough and shortness of breath.    Cardiovascular: Negative for chest pain.   Gastrointestinal: Negative for abdominal pain, diarrhea, nausea and vomiting.   Genitourinary: Negative for dysuria.   Musculoskeletal: Negative for joint pain.   Skin: Negative for rash.   Neurological: Negative for focal weakness and headaches.   All other systems reviewed and are negative.      Hemodynamics:  Temp (24hrs), Av.7 °C (98.1 °F), Min:36.3 °C (97.3 °F), Max:36.9 °C (98.4 °F)  Temperature: 36.8 °C (98.2 °F)  Pulse  Av  Min: 66  Max: 120Heart Rate (Monitored): 68  Blood Pressure : 126/59, NIBP: 117/55       Physical Exam:  Physical Exam   Constitutional: She is oriented to person, place, and time. She appears well-developed and well-nourished. No distress.   HENT:   Head: Normocephalic and atraumatic.   Nose: Nose normal.   Mouth/Throat: No oropharyngeal exudate.   Eyes: Pupils are equal, round, and reactive to light. Conjunctivae and EOM are normal. Right eye exhibits no discharge. Left eye exhibits no discharge. No scleral icterus.   Neck: Neck supple. No JVD present.   Cardiovascular: Normal rate, regular rhythm, normal heart sounds and intact distal pulses.  Exam reveals no gallop and no friction rub.    No murmur heard.  Pulmonary/Chest: Effort normal. No respiratory distress. She has no wheezes. She has rales.   Site of prior port over the left chest wall  with dressing in place  Some scattered mild rales left greater than right   Abdominal: Soft. Bowel sounds are normal. She exhibits no distension. There is no tenderness. There is no rebound.   Musculoskeletal: Normal range of motion. She exhibits no edema.   Lymphadenopathy:     She has no cervical adenopathy.   Neurological: She is alert and oriented to person, place, and time.   No gross cranial nerve deficit, no FND   Skin: Skin is warm and dry.   Psychiatric: She has a normal mood and affect. Her behavior is normal.   Vitals reviewed.      Meds:    Current Facility-Administered Medications:   •  DILTIAZem  •  metoprolol  •  famotidine **OR** famotidine  •  NS  •  MD Alert...Vancomycin per Pharmacy  •  vancomycin  •  ipratropium-albuterol  •  heparin  •  MD Alert...Adult ICU Electrolyte Replacement per Pharmacy  •  MD Alert...PROPOFOL CRITICAL CARE PROTOCOL  •  fentaNYL **OR** fentaNYL **OR** fentaNYL  •  ipratropium-albuterol  •  methylPREDNISolone  •  guaiFENesin  •  NS  •  simvastatin  •  NS  •  senna-docusate **AND** polyethylene glycol/lytes **AND** magnesium hydroxide **AND** bisacodyl  •  ondansetron  •  ondansetron  •  Notify provider if pain remains uncontrolled **AND** Use the numeric rating scale (NRS-11) on regular floors and Critical-Care Pain Observation Tool (CPOT) on ICUs/Trauma to assess pain **AND** Pulse Ox (Oximetry) **AND** Pharmacy Consult Request **AND** If patient difficult to arouse and/or has respiratory depression, stop any opiates that are currently infusing and call a Rapid Response. **AND** oxyCODONE immediate-release **AND** oxyCODONE immediate-release **AND** HYDROmorphone  •  budesonide-formoterol  •  furosemide  •  tramadol  •  Respiratory Care per Protocol    Labs:  Recent Labs      09/15/18   0345  09/16/18   0434   WBC  19.7*  18.5*   RBC  4.93  4.68   HEMOGLOBIN  12.8  12.7   HEMATOCRIT  42.3  40.4   MCV  85.8  86.3   MCH  26.0*  27.1   RDW  47.2  47.4   PLATELETCT  279  492    MPV  9.8  9.6   NEUTSPOLYS  70.80  92.30*   LYMPHOCYTES  18.10*  4.60*   MONOCYTES  9.30  2.20   EOSINOPHILS  0.90  0.00   BASOPHILS  0.20  0.10     Recent Labs      09/15/18   0345  09/16/18   0434   SODIUM  134*  138   POTASSIUM  4.0  4.4   CHLORIDE  95*  99   CO2  36*  33   GLUCOSE  129*  177*   BUN  18  12     Recent Labs      09/15/18   0345  09/16/18   0434   ALBUMIN  3.3   --    TBILIRUBIN  0.4   --    ALKPHOSPHAT  53   --    TOTPROTEIN  5.6*   --    ALTSGPT  24   --    ASTSGOT  11*   --    CREATININE  0.40*  0.38*       Imaging:  Dx-chest-portable (1 View)    Result Date: 9/16/2018 9/16/2018 3:48 AM HISTORY/REASON FOR EXAM:  For indication of respiratory failure TECHNIQUE/EXAM DESCRIPTION AND NUMBER OF VIEWS: Single portable view of the chest. COMPARISON: Yesterday FINDINGS: The cardiomediastinal silhouettes are unchanged. Scattered bilateral interstitial opacities persist, in addition to left basilar atelectasis. Minimal atelectasis is also now developed in the right lung base. There is a small left pleural effusion. No pneumothorax. Endotracheal tube is no longer present.     New right basilar atelectasis. No significant change otherwise.    Dx-chest-portable (1 View)    Result Date: 9/15/2018  9/15/2018 4:46 PM HISTORY/REASON FOR EXAM:  POST INTUBATION. TECHNIQUE/EXAM DESCRIPTION AND NUMBER OF VIEWS: Single portable view of the chest. COMPARISON: 9/15/2018 FINDINGS: Cardiac mediastinal contour is unchanged. Lungs show hypoinflation. Increased opacity LEFT lung base with blunting of the costophrenic angle. No pneumothorax. Pulmonary vasculature is prominent. Endotracheal tube in place with tip approximately 5 cm above the roxana. Interval removal of LEFT subclavian catheter. Postoperative change of medial LEFT clavicle. Multiple surgical clips project over the LEFT upper chest.     1.  Supportive tubing as described above. 2.  Hypoinflation with worsening LEFT basilar atelectasis and pulmonary vascular  congestion. 3.  Minimal LEFT pleural effusion again seen. 4.  No pneumothorax.    Dx-chest-portable (1 View)    Result Date: 9/15/2018  9/15/2018 10:35 AM HISTORY/REASON FOR EXAM:  Shortness of Breath. TECHNIQUE/EXAM DESCRIPTION AND NUMBER OF VIEWS: Single portable view of the chest. COMPARISON: None FINDINGS: Heart size is within normal limits.  There is left subclavian central line present with tip in expected location of the superior vena cava. There is mild diffuse interstitial prominence/vascular congestion. No pulmonary infiltrates or consolidations are noted. There are probable trace effusions. There is probable underlying COPD.     Mild diffuse interstitial prominence/vascular congestion with probable trace effusions.    Dx-clavicle Left    Result Date: 9/15/2018  9/15/2018 2:30 PM HISTORY/REASON FOR EXAM: . LEFT clavicle transection. TECHNIQUE/EXAM DESCRIPTION AND NUMBER OF VIEWS:  2 fluoroscopic views of the LEFT clavicle. COMPARISON: Chest x-ray 9/15/2018 FINDINGS: Images show internal fixation of medial LEFT clavicle with plate and multiple screws. 3 Seconds fluoroscopy time utilized.     Limited intraoperative images showing internal fixation of medial LEFT clavicle.      Micro:  Results     Procedure Component Value Units Date/Time    CULTURE WOUND W/ GRAM STAIN [[id number]] Collected:  09/15/18 1453    Order Status:  Completed Specimen:  Other Updated:  09/15/18 1841    ANAEROBIC CULTURE [[id number]] Collected:  09/15/18 1453    Order Status:  Completed Specimen:  Other Updated:  09/15/18 1841    FUNGAL CULTURE [[id number]] Collected:  09/15/18 1453    Order Status:  Completed Specimen:  Other Updated:  09/15/18 1841    AFB CULTURE [[id number]] Collected:  09/15/18 1453    Order Status:  Completed Specimen:  Other Updated:  09/15/18 1841    BLOOD CULTURE [[id number]] Collected:  09/15/18 0936    Order Status:  Completed Specimen:  Blood from Peripheral Updated:  09/15/18 1047    Narrative:       Per  "Hospital Policy: Only change Specimen Src: to \"Line\" if  specified by physician order.    BLOOD CULTURE [[id number]] Collected:  09/15/18 0937    Order Status:  Completed Specimen:  Blood from Peripheral Updated:  09/15/18 1047    Narrative:       Per Hospital Policy: Only change Specimen Src: to \"Line\" if  specified by physician order.    Blood Culture [[id number]]     Order Status:  No result Specimen:  Blood from Peripheral     Blood Culture [[id number]]     Order Status:  No result Specimen:  Blood from Peripheral           Assessment:  Ms. Craig is a very pleasant 68-year-old white female who was originally admitted to the hospital on 09/14/2018 due to increasing   shortness of breath x 1 day. She has a past medical history significant for alpha-1 antitrypsin deficiency on weekly Prolastin infusions, and COPD on 5 L nasal cannula at home, A. fib on Pradaxa. She was transferred from [facility] where she presented with fever, leukocytosis, and sepsis initially thought to be due to pneumonia.  She was started on antibiotics for CAP and treated for COPD exacerbation.  She subsequently grew Staph epidermidis from her blood cultures.  Patient had an indwelling PowerPort and removal was attempted at OSH, but complicated by retained catheter.  She was transferred to [facility], and underwent successful retrieval of the catheter from the left subclavian, but it required transection of the clavicle and repair of the subclavian vein followed by plating of the clavicle by orthopedics.    She is currently on vancomycin, tolerating well.  We will plan to treat with 10 days of IV antibiotics.    Active Hospital Problems    Diagnosis   • *Staphylococcus epidermidis bacteremia, likely due to catheter related bloodstream infection/infected MediPort [R78.81]   • Infected venous access port [T80.219A]   • Sepsis due to CAP, CRBSI (HCC) [A41.9]   • CAP (community acquired pneumonia) [J18.9]   • Paroxysmal atrial fibrillation (HCC) " [I48.0]   • Alpha-1-antitrypsin deficiency (HCC) [E88.01]   • COPD (chronic obstructive pulmonary disease) due to alpha-1 antitrypsin deficiency (HCC) [J44.9]   • Chronic hypoxemic respiratory failure due to COPD (HCC) [J96.11]   • Tobacco dependence [F17.200]   • GERD (gastroesophageal reflux disease) [K21.9]   • Acquired circulating anticoagulants (HCC) [D68.318]   • Acute on chronic respiratory failure with hypoxia (HCC) [J96.21]       Plan:  Sepsis, Staph epidermidis bacteremia, presumed due to infected port  -Chest x-ray here with no definitive evidence of pneumonia per my read. The plated left clavicle is seen  -Complicated removal attempt at OS, retrieved successfully at [facility] on 9/15 -required transection of the clavicle and repair of the subclavian vein followed by plating of the clavicle  -Continue IV vancomycin  -Follow repeat blood cultures here from 9/15  -Agree with KRISSY, will change duration of therapy significantly if positive for endocarditis (4-6 weeks versus 10-14 days)    Alpha-1 antitrypsin deficiency  -On weekly Prolastin infusions  -Would hold on replacing a surgically implanted port until the end of antibiotic therapy  -Hold PICC while repeat blood cultures are pending    COPD on home oxygen, chronic hypoxemic respiratory failure  - Back to baseline    ID will follow.  Please call with questions    Discussed with Dr. Gonda

## 2018-09-16 NOTE — CARE PLAN
Problem: Venous Thromboembolism (VTW)/Deep Vein Thrombosis (DVT) Prevention:  Goal: Patient will participate in Venous Thrombosis (VTE)/Deep Vein Thrombosis (DVT)Prevention Measures  Outcome: PROGRESSING AS EXPECTED  SCDs on, heparin, encourage mobility.    Problem: Fluid Volume:  Goal: Will maintain balanced intake and output  Outcome: PROGRESSING AS EXPECTED  Indwelling urinary catheter out at 1040. Pt has voided once since removal. IV SL; encourage PO intake and ambulation to bathroom to promote normal elimination.    Problem: Respiratory:  Goal: Respiratory status will improve  Outcome: PROGRESSING SLOWER THAN EXPECTED  LS dim BLL. Requiring 15L to maintain sats in high 80s although pt asymptomatic. RRT at bedside, MD notified. Encourage IS, ambulation, TCDB.

## 2018-09-16 NOTE — PROGRESS NOTES
MD Johnson at bedside stating patient is to be extubated this evening. Propofol stopped and 20 mg IV lasix administered to patient. Patient updated on status and understands. RT updated patient on SBT ABG will be drawn in 40 mins per MD.

## 2018-09-16 NOTE — PROGRESS NOTES
Asked MD if continuous fluids should be held prior to extubation MD stated okay to leave fluids on at this time, will continue to monitor.

## 2018-09-16 NOTE — RESPIRATORY CARE
Extubation    Cuff leak noted Yes  Stridor present No     FiO2%: 40 % (09/15/18 2100)  O2 (LPM): 5   O2 Daily Delivery Respiratory : Silicone Nasal Cannula   Patient toleration Yes  RCP Complete? Yes  Events/Summary/Plan: pt extubated to 5 lpm NC, no respiratory distress noted, pt tolerated well (09/15/18 2101)

## 2018-09-16 NOTE — CARE PLAN
Problem: Knowledge Deficit  Goal: Knowledge of disease process/condition, treatment plan, diagnostic tests, and medications will improve    Intervention: Assess knowledge level of disease process/condition, treatment plan, diagnostic tests, and medications  Plan of care reviewed with patient,  Plan on mobilizing after 24 hours bedrest is complete at 1600 today      Problem: Pain Management  Goal: Pain level will decrease to patient's comfort goal    Intervention: Follow pain managment plan developed in collaboration with patient and Interdisciplinary Team  Assess and medicate as needed for complaints of acute post-op pain

## 2018-09-16 NOTE — CODE DOCUMENTATION
"Pt also complaining of her vision being \"different than it was before surgery.\"  States even with her glasses, she is having trouble focusing on reading a paper at her normal distance.      "

## 2018-09-16 NOTE — PROGRESS NOTES
Pulmonary Critical Care Progress Note        DOA: 9/14/2018    Chief Complaint: SOB, fevers    History of Present Illness: 68 y.o. female with a past medical history of alpha-1 antitrypsin deficiency on Prolastin intravenously q. weekly, COPD on home oxygen at 5 L/min nasal cannula, atrial fibrillation on Pradaxa who was evaluated at [facility] on September 14 complaining of shortness of breath and found to have evidence of pneumonia with fever, leukocytosis, abnormal chest x-ray findings.  She was started on antibiotics to cover for community acquired pneumonia.  Her blood cultures grew out staph epidermidis and the concern was that her port was infected and needed to be removed.  Removal was attempted by a surgeon at [facility] but unfortunately became sheared resulting in a retained catheter in the vessel.  She was transferred to [facility] for definitive care and seen by Dr. Vidal Gil who took her to the operating room today where she underwent successful retrieval of the catheter from the left subclavian/innominate vein but did require transection of the clavicle and repair of the subclavian vein followed by plating of the clavicle by orthopedics.  She was brought to the intensive care unit postprocedure for ongoing respiratory failure management and I was consulted by Dr. Ghotra.       ROS:  Respiratory: positive cough, negative shortness of breath and negative sputum production, Cardiac: negative chest pain and negative palpitations, GI: negative nausea.  All other systems reviewed with patient and are negative.    Interval Events:  24 hour interval history reviewed    - extubated post-operatively overnight at 2130   - -130   - low UOP which responded to lasix, on MIVFs   - WBC improving   - MILDRED drain with 30mL sanguinous output   - PCT neg    PFSH:  No change.    Physical Exam   Constitutional: She is oriented to person, place, and time. She appears well-developed and  well-nourished. No distress.   HENT:   Head: Normocephalic and atraumatic.   Mouth/Throat: Oropharynx is clear and moist. No oropharyngeal exudate.   Eyes: Pupils are equal, round, and reactive to light. Conjunctivae are normal. No scleral icterus.   Neck: Neck supple. No JVD present.   Cardiovascular: Normal rate, regular rhythm and intact distal pulses.  Exam reveals distant heart sounds.    No murmur heard.  Left chest wall subclavicular area with MILDRED in drain and dressing c/d/i   Pulmonary/Chest: Effort normal. No accessory muscle usage or stridor. No respiratory distress. She has decreased breath sounds in the right lower field and the left lower field. She has no wheezes. She has rhonchi in the right lower field and the left lower field. She has no rales.   Speaking in full sentences   Abdominal: Soft. Bowel sounds are normal. She exhibits no distension. There is no tenderness.   Musculoskeletal: She exhibits no edema or tenderness.   Neurological: She is alert and oriented to person, place, and time. No cranial nerve deficit or sensory deficit.   Skin: Skin is warm and dry. Capillary refill takes less than 2 seconds. No erythema.   Psychiatric: She has a normal mood and affect. Her behavior is normal. Thought content normal.   Nursing note and vitals reviewed.    Respiratory:   6.5--> 5 lpm n/c (baseline O2 5 lpm), , PEP  Pulse Oximetry: 93 %          ImagingCXR  I have personally reviewed the chest x-ray my impression is (Compared to prior film) unchanged diffuse reticular scarring/infiltrates with COPD, left clavicular hardware, no tubes nor lines other than the MILDRED in the left anterior chest  Recent Labs      09/15/18   1705  09/15/18   1840  09/15/18   2040   ISTATAPH  7.283*  7.353*  7.403   ISTATAPCO2  81.9*  64.8*  53.7*   ISTATAPO2  72  67  53*   ISTATATCO2  41*  38*  35*   [id number]  91*  91*  86*   ISTATARTHCO3  38.7*  36.0*  33.5*   ISTATARTBE  9*  8*  7*   ISTATTEMP  see below  97.5 F  98.5  F   ISTATFIO2  50  50  40   ISTATSPEC  Arterial  Arterial  Arterial   ISTATAPHTC   --   7.362*  7.404   YGSTCOHG2GC   --   64  52*       HemoDynamics:  Pulse: 69, Heart Rate (Monitored): 68  Blood Pressure : 126/59, NIBP: 117/55       Imaging: Available data reviewed        Neuro:  GCS Total Shelly Coma Score: 15       Imaging: Available data reviewed    Fluids:  Intake/Output       09/14/18 0700 - 09/15/18 0659 09/15/18 0700 - 09/16/18 0659 09/16/18 0700 - 09/17/18 0659      9522-6440 8709-1300 Total 3155-2942 9706-3701 Total 9623-7028 6084-2290 Total       Intake    P.O.  --  0 0  0  -- 0  --  -- --    P.O. -- 0 0 0 -- 0 -- -- --    I.V.  --  -- --  726.4  1128.6 1855.1  --  -- --    Propofol Volume -- -- -- 60.4 48.6 109.1 -- -- --    IV Piggyback Volume (IV Piggyback) -- -- -- -- 250 250 -- -- --    IV Volume (Normal saline) -- -- -- 166 830 996 -- -- --    IV Volume (NS bolus) -- -- -- 500 -- 500 -- -- --    Other  --  -- --  --  150 150  --  -- --    Medications (P.O./ Enteral Liquids) -- -- -- -- 150 150 -- -- --    Total Intake -- 0 0 726.4 1278.6 2005.1 -- -- --       Output    Urine  --  -- --  515  1625 2140  --  -- --    Number of Times Voided -- 1 x 1 x 1 x -- 1 x -- -- --    Urine Void (mL) (non-catheter) -- -- -- 500 -- 500 -- -- --    Output (mL) (Urinary Catheter Indwelling Catheter 16) -- -- -- 15 1625 1640 -- -- --    Drains  --  -- --  30  30 60  --  -- --    Output (ml) (Surgical Drain Group Left;Upper;Chest Maged Frances) -- -- -- 30 30 60 -- -- --    Stool  --  -- --  --  -- --  --  -- --    Number of Times Stooled -- 0 x 0 x -- -- -- -- -- --    Total Output -- -- -- 545 1655 2200 -- -- --       Net I/O     -- 0 0 181.4 -376.4 -194.9 -- -- --        Weight: 70.2 kg (154 lb 12.2 oz)  Recent Labs      09/15/18   0345  09/16/18   0434   SODIUM  134*  138   POTASSIUM  4.0  4.4   CHLORIDE  95*  99   CO2  36*  33   BUN  18  12   CREATININE  0.40*  0.38*   MAGNESIUM   --   2.2   PHOSPHORUS   --    4.2   CALCIUM  8.8  8.3*       GI/Nutrition:    Imaging: Available data reviewed  taking PO  Liver Function  Recent Labs      09/15/18   0345  09/16/18   043   ALTSGPT  24   --    ASTSGOT  11*   --    ALKPHOSPHAT  53   --    TBILIRUBIN  0.4   --    GLUCOSE  129*  177*       Heme:  Recent Labs      09/15/18   0345  18   RBC  4.93  4.68   HEMOGLOBIN  12.8  12.7   HEMATOCRIT  42.3  40.4   PLATELETCT  279  268       Infectious Disease:  Temp  Av.7 °C (98.1 °F)  Min: 36.3 °C (97.3 °F)  Max: 36.9 °C (98.4 °F)  Micro: reviewed  Recent Labs      09/15/18   0345  09/16/18   0434   WBC  19.7*  18.5*   NEUTSPOLYS  70.80  92.30*   LYMPHOCYTES  18.10*  4.60*   MONOCYTES  9.30  2.20   EOSINOPHILS  0.90  0.00   BASOPHILS  0.20  0.10   ASTSGOT  11*   --    ALTSGPT  24   --    ALKPHOSPHAT  53   --    TBILIRUBIN  0.4   --      Current Facility-Administered Medications   Medication Dose Frequency Provider Last Rate Last Dose   • DILTIAZem (CARDIZEM) tablet 90 mg  90 mg Q6HRS Vidal Ghotra M.D.   90 mg at 18 0543   • metoprolol (LOPRESSOR) tablet 100 mg  100 mg TWICE DAILY Vidal Ghotra M.D.   100 mg at 18 0543   • famotidine (PEPCID) tablet 20 mg  20 mg Q12HRS Vidal Ghotra M.D.   20 mg at 18 0543    Or   • famotidine (PEPCID) injection 20 mg  20 mg Q12HRS Vidal Ghotra M.D.       • MD Alert...Vancomycin per Pharmacy   pharmacy to dose Rahul Burger M.D.       • vancomycin 1,400 mg in  mL IVPB  20 mg/kg Q24HR Rahul Burger M.D.   Stopped at 18 0225   • ipratropium-albuterol (DUONEB) nebulizer solution  3 mL Q4H PRN (RT) Luke Kendall M.D.   3 mL at 09/15/18 1622   • heparin injection 5,000 Units  5,000 Units Q8HRS Max Romero M.D.   5,000 Units at 18 0543   • MD Alert...ICU Electrolyte Replacement per Pharmacy   pharmacy to dose Jeremy M Gonda, M.D.       • fentaNYL (SUBLIMAZE) injection 25 mcg  25 mcg Q HOUR PRN Jeremy M Gonda, M.D.        Or   • fentaNYL  (SUBLIMAZE) injection 50 mcg  50 mcg Q HOUR PRN Jeremy M Gonda, M.D.   50 mcg at 09/15/18 1934    Or   • fentaNYL (SUBLIMAZE) injection 100 mcg  100 mcg Q HOUR PRN Jeremy M Gonda, M.D.       • ipratropium-albuterol (DUONEB) nebulizer solution  3 mL Q2HRS PRN (RT) Jeremy M Gonda, M.D.       • methylPREDNISolone sod succ (SOLU-MEDROL) 125 MG injection 62.5 mg  62.5 mg Q8HRS Jeremy M Gonda, M.D.   62.5 mg at 09/16/18 0543   • guaiFENesin (ROBITUSSIN) 100 MG/5ML solution 200 mg  10 mL Q4HRS Jeremy M Gonda, M.D.   200 mg at 09/16/18 0544   • NS infusion   Continuous Jeremy M Gonda, M.D. 83 mL/hr at 09/15/18 2137     • simvastatin (ZOCOR) tablet 10 mg  10 mg Q EVENING Vidal Ghotra M.D.   Stopped at 09/15/18 1800   • NS infusion 500 mL  500 mL Once Jeremy M Gonda, M.D.       • senna-docusate (PERICOLACE or SENOKOT S) 8.6-50 MG per tablet 2 Tab  2 Tab BID Rahul Burger M.D.   2 Tab at 09/16/18 0543    And   • polyethylene glycol/lytes (MIRALAX) PACKET 1 Packet  1 Packet QDAY PRN Rahul Burger M.D.        And   • magnesium hydroxide (MILK OF MAGNESIA) suspension 30 mL  30 mL QDAY PRN Rahul Burger M.D.        And   • bisacodyl (DULCOLAX) suppository 10 mg  10 mg QDAY PRN Rahul Burger M.D.       • ondansetron (ZOFRAN) syringe/vial injection 4 mg  4 mg Q4HRS PRN Rahul Burger M.D.       • ondansetron (ZOFRAN ODT) dispertab 4 mg  4 mg Q4HRS PRN Vidal Ghotra M.D.       • Pharmacy Consult Request ...Pain Management Review   PRN Rahul Burger M.D.        And   • oxyCODONE immediate-release (ROXICODONE) tablet 5 mg  5 mg Q3HRS PRN Rahul Burger M.D.        And   • oxyCODONE immediate release (ROXICODONE) tablet 10 mg  10 mg Q3HRS PRN Rahul Burger M.D.        And   • HYDROmorphone (DILAUDID) injection 0.5 mg  0.5 mg Q3HRS PRN Rahul Burger M.D.   0.5 mg at 09/15/18 1707   • budesonide-formoterol (SYMBICORT) 160-4.5 MCG/ACT inhaler 2 Puff  2 Puff Q12HRS Rahul Burger M.D.   2 Puff at  09/16/18 0544   • furosemide (LASIX) tablet 20 mg  20 mg QDAY PRN Vidal Ghotra M.D.       • tramadol (ULTRAM) 50 MG tablet 50 mg  50 mg Q4HRS PRN Rahul Burger M.D.       • Respiratory Care per Protocol   Continuous RT Rahul Burger M.D.         Last reviewed on 9/15/2018  9:57 PM by Dee Bassett R.N.    Quality  Measures:  Medications reviewed, Labs reviewed and Radiology images reviewed  Little catheter: No Little  Central line in place: Need for access    DVT Prophylaxis: Heparin  DVT prophylaxis - mechanical: SCDs  Ulcer prophylaxis: Not indicated  Antibiotics: Treating active infection/contamination beyond 24 hours perioperative coverage  Assessed for rehab: Patient was assess for and/or received rehabilitation services during this hospitalization    Assessment and Plan:  Acute on chronic hypoxemic respiratory failure -intubated perioperatively 9/15              -Encourage incentive spirometry/PEP, out of bed to chair              - RT/O2 protocols              - limit sedatives  Alpha-1 antitrypsin deficiency/COPD on chronic 5 L/min nasal cannula 24 hours a day with acute exacerbation              - cont steroids IV, BDs scheduled and prn              - will need Prolastin once available (dose due last Thurs)  Community acquired pneumonia with staph epidermidis bacteremia - unchanged              - cont abx, ID following              - f/u repeat Bcx              - KRISSY pending  Retained port catheter status post retrieval/left clavicle transection/subclavian vein repair 9/15              - surgical site management, MILDRED drain   - analgesia  Gastroesophageal reflux disease - PPI  Paroxysmal atrial fibrillation on chronic anticoagulation - rate controlled              - cont dilt and BB              - holding anticoagulation until cleared by surgery (xarelto)  Tobacco dependence - nicotine patch  Sepsis from pulmonary source - resolved              - s/p sepsis protocol              - abx  Hyponatremia  - improving              - monitor, d/c IVFs  HLD - statin  Prophylaxis, diet    Ok to transfer patient out of ICU today. [facility] to continue to follow after transfer.    Discussed patient condition and risk of morbidity and/or mortality with RN, RT, Pharmacy, Charge nurse / hot rounds, Patient and hospitalist and vascular surgery.      Addendum: 1600 after looking good this morning patient was transferred to the floor but subsequently became more hypoxic requiring up to high flow nasal cannula.  A repeat chest x-ray showing increasing edema with right lower lobe opacification concerning for either atelectasis versus effusion.  She was transferred back to intensive care unit where I initiated scheduled Lasix, chest pulmonary therapy with mucolytic's and will recheck a chest x-ray in the morning.  She is currently protecting her airway and is no acute distress.  I will titrate her high flow nasal cannula back down to keep her SaO2 greater than 87%.

## 2018-09-16 NOTE — RESPIRATORY CARE
Put pt on spont per Dr Johnson, Per Dr AU to get an ABG between 7713-7285, plan is to extubated pt this night

## 2018-09-16 NOTE — CODE DOCUMENTATION
Dr James updated on pt status, pt continues to be asymptomatic, on 55F056% HFNC.  Decision made to transfer pt back to ICU for closer monitoring.

## 2018-09-16 NOTE — PROGRESS NOTES
Gabriela James at 1341.   Gabriela James at 1401. Received callback.    Clarified that blood cultures were to be drawn yesterday only, not repeated today, as draw was triggered when this RN switched labs to lab collect from clinician collect per protocol. Per MD, not needed today. Order cancelled.    Notified MD that pt sat 83% on 3 different pulse oximeters with pt on 15 L oxygen via nc. RR 24, unlabored, states she is asymptomatic.    MD ordered stat CXR and states he will be by to see pt.     RRT BOBBY Palacios also called for consult and is at bedside with pt. Pt on non-rebreather mask now and continues to sat in mid-80s. Requested Rapid response called so orders may be placed per protocol.    Rapid response called. Charge RN and MD notified.

## 2018-09-16 NOTE — PROGRESS NOTES
"Pharmacy Kinetics 68 y.o. female on vancomycin day # 2 2018    Currently on Vancomycin 1400 mg IV q24h (0100)    Indication for Treatment: r/o bacteremia    Pertinent history per medical record: Admitted on 2018 for port infection. Patient with implanted port for weekly Prolastin infusions for her alpha-1 antitrypsin deficiency. She is transferred from OSH where she presented with one day h/o SOB. She was admitted and treated for COPD/PNA. Her blood cx subsequently grew staph epi which was thought to be due to her port. Removal was felt to be too complicated and patient was transferred to [facility] for vascular consult.    Other antibiotics: none    Allergies: Patient has no known allergies.     List concerns for renal function: age    Pertinent cultures to date:   9/15/18: Blood, peripheral x2 = NGTD  9/15/18: subclavian cath tip = NGTD    Recent Labs      09/15/18   0345  18   0434   WBC  19.7*  18.5*   NEUTSPOLYS  70.80  92.30*     Recent Labs      09/15/18   0345  18   0434   BUN  18  12   CREATININE  0.40*  0.38*   ALBUMIN  3.3   --      Intake/Output Summary (Last 24 hours) at 18 1325  Last data filed at 18 1000   Gross per 24 hour   Intake          2764.06 ml   Output             1775 ml   Net           989.06 ml      Blood pressure 118/64, pulse 82, temperature 36.1 °C (97 °F), resp. rate 20, height 1.6 m (5' 2.99\"), weight 70.2 kg (154 lb 12.2 oz), SpO2 (!) 87 %, not currently breastfeeding. Temp (24hrs), Av.7 °C (98.1 °F), Min:36.1 °C (97 °F), Max:36.9 °C (98.4 °F)    A/P   1. Vancomycin dose change: not indicated  2. Next vancomycin level: 1-2 days (not ordered)  3. Goal trough: ~16 mcg/mL  4. Comments: [facility] seeing patient. Renal stable. Extubated and transferred from unit today. Plan for KRISSY to r/o endocarditis as TTE had some mitral valve thickening. Continue same, await cx results. Trough in a couple of days.    Baudilio Whitney, PharmD, BCPS    "

## 2018-09-17 ENCOUNTER — APPOINTMENT (OUTPATIENT)
Dept: RADIOLOGY | Facility: MEDICAL CENTER | Age: 68
DRG: 270 | End: 2018-09-17
Attending: INTERNAL MEDICINE
Payer: MEDICARE

## 2018-09-17 ENCOUNTER — TELEPHONE (OUTPATIENT)
Dept: CARDIOLOGY | Facility: MEDICAL CENTER | Age: 68
End: 2018-09-17

## 2018-09-17 PROBLEM — J96.21 ACUTE ON CHRONIC RESPIRATORY FAILURE WITH HYPOXEMIA (HCC): Status: ACTIVE | Noted: 2018-09-15

## 2018-09-17 LAB
ALBUMIN SERPL BCP-MCNC: 3.1 G/DL (ref 3.2–4.9)
ALBUMIN/GLOB SERPL: 1.3 G/DL
ALP SERPL-CCNC: 57 U/L (ref 30–99)
ALT SERPL-CCNC: 17 U/L (ref 2–50)
ANION GAP SERPL CALC-SCNC: 6 MMOL/L (ref 0–11.9)
AST SERPL-CCNC: 10 U/L (ref 12–45)
BASOPHILS # BLD AUTO: 0.3 % (ref 0–1.8)
BASOPHILS # BLD: 0.06 K/UL (ref 0–0.12)
BILIRUB SERPL-MCNC: 0.3 MG/DL (ref 0.1–1.5)
BUN SERPL-MCNC: 15 MG/DL (ref 8–22)
CALCIUM SERPL-MCNC: 8.5 MG/DL (ref 8.5–10.5)
CHLORIDE SERPL-SCNC: 98 MMOL/L (ref 96–112)
CO2 SERPL-SCNC: 32 MMOL/L (ref 20–33)
CREAT SERPL-MCNC: 0.35 MG/DL (ref 0.5–1.4)
EOSINOPHIL # BLD AUTO: 0 K/UL (ref 0–0.51)
EOSINOPHIL NFR BLD: 0 % (ref 0–6.9)
ERYTHROCYTE [DISTWIDTH] IN BLOOD BY AUTOMATED COUNT: 46.5 FL (ref 35.9–50)
GLOBULIN SER CALC-MCNC: 2.3 G/DL (ref 1.9–3.5)
GLUCOSE SERPL-MCNC: 210 MG/DL (ref 65–99)
HCT VFR BLD AUTO: 37.9 % (ref 37–47)
HGB BLD-MCNC: 11.5 G/DL (ref 12–16)
IMM GRANULOCYTES # BLD AUTO: 0.21 K/UL (ref 0–0.11)
IMM GRANULOCYTES NFR BLD AUTO: 0.9 % (ref 0–0.9)
LYMPHOCYTES # BLD AUTO: 0.99 K/UL (ref 1–4.8)
LYMPHOCYTES NFR BLD: 4.4 % (ref 22–41)
MAGNESIUM SERPL-MCNC: 2.4 MG/DL (ref 1.5–2.5)
MCH RBC QN AUTO: 26 PG (ref 27–33)
MCHC RBC AUTO-ENTMCNC: 30.3 G/DL (ref 33.6–35)
MCV RBC AUTO: 85.7 FL (ref 81.4–97.8)
MONOCYTES # BLD AUTO: 0.89 K/UL (ref 0–0.85)
MONOCYTES NFR BLD AUTO: 3.9 % (ref 0–13.4)
NEUTROPHILS # BLD AUTO: 20.44 K/UL (ref 2–7.15)
NEUTROPHILS NFR BLD: 90.5 % (ref 44–72)
NRBC # BLD AUTO: 0 K/UL
NRBC BLD-RTO: 0 /100 WBC
PHOSPHATE SERPL-MCNC: 2.3 MG/DL (ref 2.5–4.5)
PLATELET # BLD AUTO: 267 K/UL (ref 164–446)
PMV BLD AUTO: 10.4 FL (ref 9–12.9)
POTASSIUM SERPL-SCNC: 4 MMOL/L (ref 3.6–5.5)
PROCALCITONIN SERPL-MCNC: <0.05 NG/ML
PROT SERPL-MCNC: 5.4 G/DL (ref 6–8.2)
RBC # BLD AUTO: 4.42 M/UL (ref 4.2–5.4)
SODIUM SERPL-SCNC: 136 MMOL/L (ref 135–145)
WBC # BLD AUTO: 22.6 K/UL (ref 4.8–10.8)

## 2018-09-17 PROCEDURE — 94640 AIRWAY INHALATION TREATMENT: CPT

## 2018-09-17 PROCEDURE — 700111 HCHG RX REV CODE 636 W/ 250 OVERRIDE (IP): Performed by: HOSPITALIST

## 2018-09-17 PROCEDURE — 94669 MECHANICAL CHEST WALL OSCILL: CPT

## 2018-09-17 PROCEDURE — A9270 NON-COVERED ITEM OR SERVICE: HCPCS | Performed by: HOSPITALIST

## 2018-09-17 PROCEDURE — 700111 HCHG RX REV CODE 636 W/ 250 OVERRIDE (IP): Performed by: INTERNAL MEDICINE

## 2018-09-17 PROCEDURE — 99233 SBSQ HOSP IP/OBS HIGH 50: CPT | Performed by: HOSPITALIST

## 2018-09-17 PROCEDURE — A9270 NON-COVERED ITEM OR SERVICE: HCPCS | Performed by: INTERNAL MEDICINE

## 2018-09-17 PROCEDURE — 84100 ASSAY OF PHOSPHORUS: CPT

## 2018-09-17 PROCEDURE — 770022 HCHG ROOM/CARE - ICU (200)

## 2018-09-17 PROCEDURE — 700102 HCHG RX REV CODE 250 W/ 637 OVERRIDE(OP): Performed by: INTERNAL MEDICINE

## 2018-09-17 PROCEDURE — 94760 N-INVAS EAR/PLS OXIMETRY 1: CPT

## 2018-09-17 PROCEDURE — 80053 COMPREHEN METABOLIC PANEL: CPT

## 2018-09-17 PROCEDURE — 99291 CRITICAL CARE FIRST HOUR: CPT | Performed by: INTERNAL MEDICINE

## 2018-09-17 PROCEDURE — 71045 X-RAY EXAM CHEST 1 VIEW: CPT

## 2018-09-17 PROCEDURE — 700102 HCHG RX REV CODE 250 W/ 637 OVERRIDE(OP): Performed by: HOSPITALIST

## 2018-09-17 PROCEDURE — 700102 HCHG RX REV CODE 250 W/ 637 OVERRIDE(OP): Performed by: SURGERY

## 2018-09-17 PROCEDURE — 94668 MNPJ CHEST WALL SBSQ: CPT

## 2018-09-17 PROCEDURE — 83735 ASSAY OF MAGNESIUM: CPT | Mod: 91

## 2018-09-17 PROCEDURE — A9270 NON-COVERED ITEM OR SERVICE: HCPCS | Performed by: SURGERY

## 2018-09-17 PROCEDURE — 80048 BASIC METABOLIC PNL TOTAL CA: CPT

## 2018-09-17 PROCEDURE — 700105 HCHG RX REV CODE 258: Performed by: HOSPITALIST

## 2018-09-17 PROCEDURE — 85025 COMPLETE CBC W/AUTO DIFF WBC: CPT

## 2018-09-17 PROCEDURE — 80202 ASSAY OF VANCOMYCIN: CPT

## 2018-09-17 PROCEDURE — 700101 HCHG RX REV CODE 250: Performed by: INTERNAL MEDICINE

## 2018-09-17 PROCEDURE — 99233 SBSQ HOSP IP/OBS HIGH 50: CPT | Performed by: INTERNAL MEDICINE

## 2018-09-17 PROCEDURE — 84145 PROCALCITONIN (PCT): CPT

## 2018-09-17 RX ORDER — GUAIFENESIN 600 MG/1
1200 TABLET, EXTENDED RELEASE ORAL EVERY 12 HOURS
Status: DISCONTINUED | OUTPATIENT
Start: 2018-09-17 | End: 2018-09-22 | Stop reason: HOSPADM

## 2018-09-17 RX ORDER — METHYLPREDNISOLONE SODIUM SUCCINATE 40 MG/ML
40 INJECTION, POWDER, LYOPHILIZED, FOR SOLUTION INTRAMUSCULAR; INTRAVENOUS EVERY 8 HOURS
Status: DISCONTINUED | OUTPATIENT
Start: 2018-09-17 | End: 2018-09-18

## 2018-09-17 RX ORDER — DABIGATRAN ETEXILATE 75 MG/1
75 CAPSULE ORAL 2 TIMES DAILY
Status: DISCONTINUED | OUTPATIENT
Start: 2018-09-17 | End: 2018-09-18

## 2018-09-17 RX ORDER — FUROSEMIDE 10 MG/ML
20 INJECTION INTRAMUSCULAR; INTRAVENOUS ONCE
Status: COMPLETED | OUTPATIENT
Start: 2018-09-17 | End: 2018-09-17

## 2018-09-17 RX ORDER — METOPROLOL SUCCINATE 50 MG/1
100 TABLET, EXTENDED RELEASE ORAL 2 TIMES DAILY
Status: DISCONTINUED | OUTPATIENT
Start: 2018-09-17 | End: 2018-09-22 | Stop reason: HOSPADM

## 2018-09-17 RX ADMIN — SODIUM BICARBONATE 3 ML: 84 INJECTION, SOLUTION INTRAVENOUS at 14:53

## 2018-09-17 RX ADMIN — METOPROLOL TARTRATE 100 MG: 25 TABLET, FILM COATED ORAL at 05:18

## 2018-09-17 RX ADMIN — METOPROLOL SUCCINATE 100 MG: 100 TABLET, FILM COATED, EXTENDED RELEASE ORAL at 16:55

## 2018-09-17 RX ADMIN — SODIUM BICARBONATE 3 ML: 84 INJECTION, SOLUTION INTRAVENOUS at 02:27

## 2018-09-17 RX ADMIN — OXYCODONE HYDROCHLORIDE 10 MG: 10 TABLET ORAL at 19:44

## 2018-09-17 RX ADMIN — METHYLPREDNISOLONE SODIUM SUCCINATE 40 MG: 40 INJECTION, POWDER, FOR SOLUTION INTRAMUSCULAR; INTRAVENOUS at 14:44

## 2018-09-17 RX ADMIN — BUDESONIDE AND FORMOTEROL FUMARATE DIHYDRATE 2 PUFF: 160; 4.5 AEROSOL RESPIRATORY (INHALATION) at 05:18

## 2018-09-17 RX ADMIN — STANDARDIZED SENNA CONCENTRATE AND DOCUSATE SODIUM 2 TABLET: 8.6; 5 TABLET ORAL at 16:56

## 2018-09-17 RX ADMIN — SODIUM BICARBONATE 3 ML: 84 INJECTION, SOLUTION INTRAVENOUS at 07:39

## 2018-09-17 RX ADMIN — IPRATROPIUM BROMIDE AND ALBUTEROL SULFATE 3 ML: .5; 3 SOLUTION RESPIRATORY (INHALATION) at 02:27

## 2018-09-17 RX ADMIN — IPRATROPIUM BROMIDE AND ALBUTEROL SULFATE 3 ML: .5; 3 SOLUTION RESPIRATORY (INHALATION) at 23:25

## 2018-09-17 RX ADMIN — IPRATROPIUM BROMIDE AND ALBUTEROL SULFATE 3 ML: .5; 3 SOLUTION RESPIRATORY (INHALATION) at 19:15

## 2018-09-17 RX ADMIN — VANCOMYCIN HYDROCHLORIDE 1400 MG: 100 INJECTION, POWDER, LYOPHILIZED, FOR SOLUTION INTRAVENOUS at 00:45

## 2018-09-17 RX ADMIN — METHYLPREDNISOLONE SODIUM SUCCINATE 40 MG: 40 INJECTION, POWDER, FOR SOLUTION INTRAMUSCULAR; INTRAVENOUS at 23:26

## 2018-09-17 RX ADMIN — OXYCODONE HYDROCHLORIDE 10 MG: 10 TABLET ORAL at 23:36

## 2018-09-17 RX ADMIN — GUAIFENESIN 200 MG: 100 SOLUTION ORAL at 11:36

## 2018-09-17 RX ADMIN — DABIGATRAN ETEXILATE MESYLATE 75 MG: 75 CAPSULE ORAL at 22:16

## 2018-09-17 RX ADMIN — IPRATROPIUM BROMIDE AND ALBUTEROL SULFATE 3 ML: .5; 3 SOLUTION RESPIRATORY (INHALATION) at 07:39

## 2018-09-17 RX ADMIN — OXYCODONE HYDROCHLORIDE 10 MG: 10 TABLET ORAL at 05:18

## 2018-09-17 RX ADMIN — SODIUM BICARBONATE 3 ML: 84 INJECTION, SOLUTION INTRAVENOUS at 10:40

## 2018-09-17 RX ADMIN — HEPARIN SODIUM 5000 UNITS: 5000 INJECTION, SOLUTION INTRAVENOUS; SUBCUTANEOUS at 14:44

## 2018-09-17 RX ADMIN — FUROSEMIDE 20 MG: 10 INJECTION, SOLUTION INTRAMUSCULAR; INTRAVENOUS at 12:19

## 2018-09-17 RX ADMIN — SIMVASTATIN 10 MG: 10 TABLET, FILM COATED ORAL at 16:56

## 2018-09-17 RX ADMIN — METHYLPREDNISOLONE SODIUM SUCCINATE 62.5 MG: 125 INJECTION, POWDER, FOR SOLUTION INTRAMUSCULAR; INTRAVENOUS at 05:19

## 2018-09-17 RX ADMIN — IPRATROPIUM BROMIDE AND ALBUTEROL SULFATE 3 ML: .5; 3 SOLUTION RESPIRATORY (INHALATION) at 10:39

## 2018-09-17 RX ADMIN — FUROSEMIDE 20 MG: 10 INJECTION, SOLUTION INTRAMUSCULAR; INTRAVENOUS at 05:18

## 2018-09-17 RX ADMIN — GUAIFENESIN 200 MG: 100 SOLUTION ORAL at 05:18

## 2018-09-17 RX ADMIN — IPRATROPIUM BROMIDE AND ALBUTEROL SULFATE 3 ML: .5; 3 SOLUTION RESPIRATORY (INHALATION) at 14:53

## 2018-09-17 RX ADMIN — STANDARDIZED SENNA CONCENTRATE AND DOCUSATE SODIUM 2 TABLET: 8.6; 5 TABLET ORAL at 05:18

## 2018-09-17 RX ADMIN — OXYCODONE HYDROCHLORIDE 10 MG: 10 TABLET ORAL at 00:19

## 2018-09-17 RX ADMIN — GUAIFENESIN 1200 MG: 600 TABLET, EXTENDED RELEASE ORAL at 16:55

## 2018-09-17 RX ADMIN — SODIUM BICARBONATE 3 ML: 84 INJECTION, SOLUTION INTRAVENOUS at 23:25

## 2018-09-17 RX ADMIN — HEPARIN SODIUM 5000 UNITS: 5000 INJECTION, SOLUTION INTRAVENOUS; SUBCUTANEOUS at 05:19

## 2018-09-17 RX ADMIN — FUROSEMIDE 20 MG: 10 INJECTION, SOLUTION INTRAMUSCULAR; INTRAVENOUS at 16:55

## 2018-09-17 RX ADMIN — DILTIAZEM HYDROCHLORIDE 360 MG: 360 CAPSULE, EXTENDED RELEASE ORAL at 05:19

## 2018-09-17 RX ADMIN — SODIUM BICARBONATE 3 ML: 84 INJECTION, SOLUTION INTRAVENOUS at 19:15

## 2018-09-17 ASSESSMENT — ENCOUNTER SYMPTOMS
SHORTNESS OF BREATH: 1
SORE THROAT: 0
COUGH: 0
SHORTNESS OF BREATH: 0
VOMITING: 0
HEADACHES: 0
DIARRHEA: 0
FOCAL WEAKNESS: 0
FEVER: 0
NECK PAIN: 0
CHILLS: 0
PALPITATIONS: 0
ABDOMINAL PAIN: 0
NAUSEA: 0
WEIGHT LOSS: 0

## 2018-09-17 ASSESSMENT — CHA2DS2 SCORE
AGE 65 TO 74: YES
SEX: FEMALE
VASCULAR DISEASE: NO
PRIOR STROKE OR TIA OR THROMBOEMBOLISM: NO
CHA2DS2 VASC SCORE: 2
AGE 75 OR GREATER: NO
CHF OR LEFT VENTRICULAR DYSFUNCTION: NO
HYPERTENSION: NO
DIABETES: NO

## 2018-09-17 ASSESSMENT — COGNITIVE AND FUNCTIONAL STATUS - GENERAL
TOILETING: A LITTLE
SUGGESTED CMS G CODE MODIFIER MOBILITY: CK
MOVING TO AND FROM BED TO CHAIR: A LOT
HELP NEEDED FOR BATHING: A LOT
DRESSING REGULAR UPPER BODY CLOTHING: A LITTLE
SUGGESTED CMS G CODE MODIFIER DAILY ACTIVITY: CK
WALKING IN HOSPITAL ROOM: A LITTLE
PERSONAL GROOMING: A LITTLE
DAILY ACTIVITIY SCORE: 17
STANDING UP FROM CHAIR USING ARMS: A LITTLE
MOBILITY SCORE: 16
TURNING FROM BACK TO SIDE WHILE IN FLAT BAD: A LITTLE
MOVING FROM LYING ON BACK TO SITTING ON SIDE OF FLAT BED: A LITTLE
DRESSING REGULAR LOWER BODY CLOTHING: A LOT
CLIMB 3 TO 5 STEPS WITH RAILING: A LOT

## 2018-09-17 ASSESSMENT — PAIN SCALES - GENERAL
PAINLEVEL_OUTOF10: 4
PAINLEVEL_OUTOF10: 5
PAINLEVEL_OUTOF10: 0
PAINLEVEL_OUTOF10: 4
PAINLEVEL_OUTOF10: 0
PAINLEVEL_OUTOF10: 7
PAINLEVEL_OUTOF10: 0
PAINLEVEL_OUTOF10: 7
PAINLEVEL_OUTOF10: 7
PAINLEVEL_OUTOF10: 0
PAINLEVEL_OUTOF10: 3

## 2018-09-17 NOTE — DOCUMENTATION QUERY
DOCUMENTATION QUERY    PROVIDERS: Please select “Cosign w/ note”to reply to query.    To better represent the severity of illness of your patient, please review the following information and exercise your independent professional judgment in responding to this query.     Pulmonary edema is documented in the Progress Notes. Based upon the clinical findings, risk factors, and treatment, can this diagnosis be further specified?    • Acute Pulmonary Edema, Cardiac Related (if applicable, please specify CHF type and acuity of heart failure)  • Acute Pulmonary Edema, Non-Cardiogenic  • Chronic Pulmonary Edema, Cardiac Related (if applicable, please specify CHF type and acuity of heart failure)  • Chronic Pulmonary Edema, Non-Cardiogenic  • Other explanation of clinical findings (please specify)  • Unable to determine    The medical record reflects the following:   Clinical Findings 9/16 MD note:  -Increase in pulmonary edema  9/16 CXR results:  -Pulmonary edema with trace effusions and bibasilar atelectasis   Treatment Monitor in the ICU  CXR  Lasix  Solu-Medrol  RT protocol  Strict I&O's   Risk Factors Respiratory failure  Sepsis  COPD  Atrial fibrillation   Location within medical record History and Physical, Progress Notes and Radiology Results     Thank you,   Marlon Romano RN BSN  Clinical   [phone] [facility] office  [phone] Cell phone

## 2018-09-17 NOTE — ASSESSMENT & PLAN NOTE
Increasing requirements of oxygen 9/16  On high flow nasal cannula and nonrebreather earlier today.  Chest x-ray ordered stat on my evaluation showing increasing pulmonary edema  Monitor in the intensive care unit  IV Lasix with monitoring of vitals and strict I's and O's  RT protocol

## 2018-09-17 NOTE — CARE PLAN
Problem: Oxygenation:  Goal: Maintain adequate oxygenation dependent on patient condition  Outcome: PROGRESSING AS EXPECTED  Pt is HHFNC 45 %    Problem: Bronchoconstriction:  Goal: Improve in air movement and diminished wheezing  Outcome: PROGRESSING AS EXPECTED  Duoneb Q4, Bicarb Q4    Problem: Hyperinflation:  Goal: Prevent or improve atelectasis  PEP QID   mL

## 2018-09-17 NOTE — PROGRESS NOTES
Vascular Surgery     Awake alert  Reports improved SOB  Feeling better    Incision looks good   No pain at surgical site     OK to anticoagulate if desired     Follow

## 2018-09-17 NOTE — PROGRESS NOTES
[facility]    Date of Service: 2018    Chief Complaint  68 y.o. female with a history of alpha-1 antitrypsin deficiency, atrial fibrillation, chronic anticoagulation, tobacco use admitted 2018 with infected access port in the left upper chest with attempt to remove at outside hospital resulted in sheared port.  She was sent to [facility] for vascular surgery to remove which was performed but somewhat complicated due to the port being in for 4 years.  There is concern of valvular thickening from [facility] from .  Patient has a history of atrial fibrillation and is been off of her Pradaxa.    Interval Problem Update  On high flow nasal canula. 45L @100%  A+OX4  Edema 1+  Cardiac diet and tolerates  800cc urine overnight  CXR: ongoing bibasilar opacification, slightly less.  KRISSY today cancelled due to respiratory needs. We will need to reschedule with cardiology once more stable.   Echo from [facility]  paper copy on the chart reviewed and showing thickened mitral leaflets      Consultants/Specialty  Cardiology for KRISSY  Vascular surgeon, Dr. Ghotra  Infectious disease  Critical care/pulmonary      Disposition  To remain in the intensive care unit for now        Review of Systems   Constitutional: Negative for chills and fever.   HENT: Negative for congestion and sore throat.    Respiratory: Negative for cough and shortness of breath.    Cardiovascular: Negative for chest pain, palpitations and leg swelling.   Gastrointestinal: Negative for abdominal pain and nausea.   Genitourinary: Negative for dysuria.   Musculoskeletal: Negative for joint pain and neck pain.   Neurological: Negative for headaches.      Physical Exam  Laboratory/Imaging   Hemodynamics  Temp (24hrs), Av.2 °C (97.1 °F), Min:35.8 °C (96.4 °F), Max:36.6 °C (97.8 °F)   Temperature: 36.6 °C (97.8 °F)  Pulse  Av.7  Min: 59  Max: 120 Heart Rate (Monitored): (!) 106  NIBP: 128/59      Respiratory      Respiration:  (!) 31, Pulse Oximetry: (!) 84 %, O2 Daily Delivery Respiratory : Highflow Nasal Cannula     Given By:: Mouthpiece, PEP/CPT Method: Flutter Valve;Vest;Positive Airway Pressure Device, Work Of Breathing / Effort: Moderate  RUL Breath Sounds: Clear, RML Breath Sounds: Clear, RLL Breath Sounds: Diminished, OPAL Breath Sounds: Clear, LLL Breath Sounds: Diminished    Fluids    Intake/Output Summary (Last 24 hours) at 09/17/18 2009  Last data filed at 09/17/18 1200   Gross per 24 hour   Intake              540 ml   Output             1200 ml   Net             -660 ml       Nutrition  Orders Placed This Encounter   Procedures   • Diet Order Cardiac     Standing Status:   Standing     Number of Occurrences:   1     Order Specific Question:   Diet:     Answer:   Cardiac [6]     Physical Exam   Constitutional: She is oriented to person, place, and time. No distress.   Overweight   HENT:   Head: Normocephalic and atraumatic.   Nose: Nose normal.   Mouth/Throat: No oropharyngeal exudate.   Eyes: Conjunctivae and EOM are normal. Right eye exhibits no discharge. Left eye exhibits no discharge.   Neck: No tracheal deviation present.   Cardiovascular: Normal rate, regular rhythm and normal heart sounds.    No murmur heard.  Pulses:       Radial pulses are 2+ on the right side, and 2+ on the left side.        Dorsalis pedis pulses are 2+ on the right side, and 2+ on the left side.   Pulmonary/Chest: Effort normal. No stridor. No respiratory distress. She has decreased breath sounds. She has no wheezes.   Abdominal: Soft. She exhibits no distension.   Musculoskeletal: She exhibits edema.   Neurological: She is alert and oriented to person, place, and time. No cranial nerve deficit.   Skin: Skin is warm. She is not diaphoretic.   Psychiatric: She has a normal mood and affect. Her behavior is normal.   Vitals reviewed.      Recent Labs      09/15/18   0345  09/16/18   0434  09/17/18   0525   WBC  19.7*  18.5*  22.6*   RBC  4.93  4.68   4.42   HEMOGLOBIN  12.8  12.7  11.5*   HEMATOCRIT  42.3  40.4  37.9   MCV  85.8  86.3  85.7   MCH  26.0*  27.1  26.0*   MCHC  30.3*  31.4*  30.3*   RDW  47.2  47.4  46.5   PLATELETCT  279  268  267   MPV  9.8  9.6  10.4     Recent Labs      09/15/18   0345  09/16/18   0434  09/17/18   0525   SODIUM  134*  138  136   POTASSIUM  4.0  4.4  4.0   CHLORIDE  95*  99  98   CO2  36*  33  32   GLUCOSE  129*  177*  210*   BUN  18  12  15   CREATININE  0.40*  0.38*  0.35*   CALCIUM  8.8  8.3*  8.5             Recent Labs      09/15/18   0345   TRIGLYCERIDE  92  93   HDL  72   LDL  113*          Assessment/Plan     * Staphylococcus epidermidis bacteremia, likely due to catheter related bloodstream infection/infected MediPort- (present on admission)   Assessment & Plan    On IV vancomycin  Infectious disease physician consulting  Monitor repeat blood cultures  9/8 Echocardiogram from outside hospital showed thickened mitral valve leaflets.    Transesophageal echocardiogram will need to be reordered with cardiology once patient more stable          Acute respiratory distress   Assessment & Plan    Increasing requirements of oxygen 9/16  On high flow nasal cannula and nonrebreather earlier today.  Chest x-ray ordered stat on my evaluation showing increasing pulmonary edema  Monitor in the intensive care unit  IV Lasix with monitoring of vitals and strict I's and O's  RT protocol        Acute on chronic respiratory failure with hypoxia (HCC)   Assessment & Plan    Increasing requirements of oxygen 9/16  Required high flow nasal cannula as of 9/16   Monitor in the intensive care unit  IV Lasix with monitoring of vitals and strict I's and O's  RT protocol  IV steroids        Sepsis due to CAP, CRBSI (HCC)- (present on admission)   Assessment & Plan    This is sepsis (without associated acute organ dysfunction).  Source is pneumonia, and infected venous port.  On vancomycin  Infectious disease consulting  Monitor CBC, vitals  R/O  endocarditis with future KRISSY once more stable        Acute on chronic respiratory failure with hypoxemia (HCC)- (present on admission)   Assessment & Plan    RT protocol with as needed bronchodilators continued home inhalers        Infected venous access port- (present on admission)   Assessment & Plan    Initially attempt at outside hospital of removal of the port but it sheared off and had to be sent to [facility] for vascular surgery to remove  Quite complicated surgery with fracture of the clavicle being required but successful removal of embedded port  vancomycin  Wound care        CAP (community acquired pneumonia)- (present on admission)   Assessment & Plan    Vancomycin  Normal procalcitonin levels ×2        Acquired circulating anticoagulants (HCC)- (present on admission)   Assessment & Plan    Previously on Pradaxa prior to surgical procedures  Restarted pradaxa        GERD (gastroesophageal reflux disease)- (present on admission)   Assessment & Plan    Pepcid        Tobacco dependence- (present on admission)   Assessment & Plan    Ongoing encouragement to stop smoking        COPD (chronic obstructive pulmonary disease) due to alpha-1 antitrypsin deficiency (HCC)- (present on admission)   Assessment & Plan    No acute exacerbation on admission  RT protocol and breathing treatments as needed  Solu-Medrol 62.5 mg IV every 8 hours        Alpha-1-antitrypsin deficiency (HCC)- (present on admission)   Assessment & Plan    Every Thursday she receives IV Prolastin  Will need a PICC line as her prior port has been removed        Paroxysmal atrial fibrillation (HCC)- (present on admission)   Assessment & Plan    On diltiazem and metoprolol  Monitor on telemetry  Stopped heparin 5000 TID and iniated on Pradaxa 75mg BID on 9/17/18          Quality-Core Measures   Reviewed items::  Radiology images reviewed, Labs reviewed and Medications reviewed  Little catheter::  No Little  DVT prophylaxis pharmacological::  Heparin  DVT  prophylaxis - mechanical:  SCDs  Antibiotics:  Treating active infection/contamination beyond 24 hours perioperative coverage

## 2018-09-17 NOTE — PROGRESS NOTES
[facility] Hospitalist Progress Note    Date of Service: 9/16/2018    Chief Complaint  68 y.o. female with a history of alpha-1 antitrypsin deficiency, atrial fibrillation, chronic anticoagulation, tobacco use admitted 9/14/2018 with infected access port in the left upper chest with attempt to remove at outside hospital resulted in sheared port.  She was sent to [facility] for vascular surgery to remove which was performed but somewhat complicated due to the port being in for 4 years.  There is concern of valvular thickening and possible endocarditis and a KRISSY is planned.  Patient has a history of atrial fibrillation and is been off of her Pradaxa.    Interval Problem Update  Initially seen in the ICU this morning she is alert and awake.  Patient was on 5 L nasal cannula doing quite well. her speech was full sentences in no distress.  She was transferred out of the ICU I was alerted by nursing that she had had increase in oxygen demand requiring nonrebreather 15 L and oxygen saturations were 85-89%.  I told the nurse to transfer patient back to the intensive care unit.  Her x-ray did show increase in pulmonary edema.  She has a history of COPD she is been initiated on IV Solu-Medrol as well as Lasix.  I discussed with Dr. Gil on when her anticoagulation could be reinitiated and he is okay with currently.  However the patient does have a KRISSY planned for Monday I will wait until her procedure.    Consultants/Specialty  Cardiology for KRISSY  Vascular surgeon, Dr. Ghotra  Infectious disease  Critical care/pulmonary      Disposition  To remain in the intensive care unit for now        Review of Systems   Constitutional: Negative for fever.   HENT: Negative for congestion and sore throat.    Eyes: Negative for blurred vision.   Respiratory: Negative for cough and shortness of breath.    Cardiovascular: Negative for chest pain, palpitations and leg swelling.   Gastrointestinal: Negative for abdominal pain and nausea.    Genitourinary: Negative for dysuria.   Musculoskeletal: Negative for joint pain and neck pain.   Neurological: Negative for speech change, weakness and headaches.   Psychiatric/Behavioral: The patient is not nervous/anxious.       Physical Exam  Laboratory/Imaging   Hemodynamics  Temp (24hrs), Av.8 °C (98.2 °F), Min:36.1 °C (97 °F), Max:37.6 °C (99.6 °F)   Temperature: 37.6 °C (99.6 °F)  Pulse  Av.2  Min: 61  Max: 120 Heart Rate (Monitored): 85  Blood Pressure : 119/56, NIBP: 141/64      Respiratory  Vasquez Vent Mode: Spont, Rate (breaths/min): 18, PEEP/CPAP: 8, PEEP/CPAP: 8, FiO2: 40, P Peak (PIP): 16, P MEAN: 11   Respiration: (!) 23, Pulse Oximetry: 88 %, O2 Daily Delivery Respiratory : Highflow Nasal Cannula     Given By:: Mouthpiece (aerobica), #MDI/DPI Given: MDI/DPI x 1, PEP/CPT Method: Positive Airway Pressure Device, Work Of Breathing / Effort: Mild  RUL Breath Sounds: Diminished, RML Breath Sounds: Diminished, RLL Breath Sounds: Diminished, OPAL Breath Sounds: Diminished, LLL Breath Sounds: Diminished    Fluids    Intake/Output Summary (Last 24 hours) at 18 1755  Last data filed at 18 1700   Gross per 24 hour   Intake          3058.44 ml   Output             1745 ml   Net          1313.44 ml       Nutrition  Orders Placed This Encounter   Procedures   • Diet Order Cardiac     Standing Status:   Standing     Number of Occurrences:   1     Order Specific Question:   Diet:     Answer:   Cardiac [6]     Physical Exam   Constitutional:   Overweight   HENT:   Head: Normocephalic and atraumatic.   Nose: Nose normal.   Mouth/Throat: No oropharyngeal exudate.   Eyes: Conjunctivae and EOM are normal. Right eye exhibits no discharge. Left eye exhibits no discharge.   Neck: No tracheal deviation present.   Cardiovascular: Normal rate, regular rhythm and normal heart sounds.    No murmur heard.  Pulses:       Radial pulses are 2+ on the right side, and 2+ on the left side.        Dorsalis pedis  pulses are 2+ on the right side, and 2+ on the left side.   Pulmonary/Chest: Effort normal. No stridor. No respiratory distress. She has decreased breath sounds. She has no wheezes. She has no rales.   Abdominal: Soft. She exhibits no distension.   Musculoskeletal: She exhibits no edema.   Neurological: She is alert. No cranial nerve deficit.   Skin: Skin is warm. She is not diaphoretic.   Psychiatric: She has a normal mood and affect. Her behavior is normal.   Vitals reviewed.      Recent Labs      09/15/18   0345  09/16/18   0434   WBC  19.7*  18.5*   RBC  4.93  4.68   HEMOGLOBIN  12.8  12.7   HEMATOCRIT  42.3  40.4   MCV  85.8  86.3   MCH  26.0*  27.1   MCHC  30.3*  31.4*   RDW  47.2  47.4   PLATELETCT  279  268   MPV  9.8  9.6     Recent Labs      09/15/18   0345  09/16/18   0434   SODIUM  134*  138   POTASSIUM  4.0  4.4   CHLORIDE  95*  99   CO2  36*  33   GLUCOSE  129*  177*   BUN  18  12   CREATININE  0.40*  0.38*   CALCIUM  8.8  8.3*             Recent Labs      09/15/18   0345   TRIGLYCERIDE  92  93   HDL  72   LDL  113*          Assessment/Plan     * Staphylococcus epidermidis bacteremia, likely due to catheter related bloodstream infection/infected MediPort- (present on admission)   Assessment & Plan    On IV vancomycin  Infectious disease physician consulting  Monitor repeat blood cultures  Echocardiogram from outside hospital showed thickened mitral valve leaflets.    Transesophageal echocardiogram by cardiology this Monday per prior note          Acute respiratory distress   Assessment & Plan    Increasing requirements of oxygen 9/16  On high flow nasal cannula and nonrebreather earlier today.  Chest x-ray ordered stat on my evaluation showing increasing pulmonary edema  Monitor in the intensive care unit  IV Lasix with monitoring of vitals and strict I's and O's  RT protocol        Sepsis due to CAP, CRBSI ([facility])- (present on admission)   Assessment & Plan    -This is sepsis (without associated acute  organ dysfunction).  Source is pneumonia, and infected venous port.  On vancomycin  Infectious disease consulting  Monitor CBC, vitals        Infected venous access port- (present on admission)   Assessment & Plan    Initially attempt at outside hospital of removal of the port but it sheared off and had to be sent to [facility] for vascular surgery to remove  Quite complicated surgery with fracture of the clavicle being required but successful removal of embedded port  Initiated on vancomycin  Wound care        CAP (community acquired pneumonia)- (present on admission)   Assessment & Plan    Vancomycin  Normal procalcitonin levels ×2        Acute on chronic respiratory failure with hypoxia (HCC)   Assessment & Plan    Increasing requirements of oxygen 9/16  On high flow nasal cannula and nonrebreather earlier today.  Chest x-ray ordered stat on my evaluation showing increasing pulmonary edema  Monitor in the intensive care unit  IV Lasix with monitoring of vitals and strict I's and O's  RT protocol        Acquired circulating anticoagulants (HCC)- (present on admission)   Assessment & Plan    Previously on Pradaxa prior to surgical procedures        GERD (gastroesophageal reflux disease)- (present on admission)   Assessment & Plan    Pepcid        Tobacco dependence- (present on admission)   Assessment & Plan    Ongoing encouragement to stop smoking        Chronic hypoxemic respiratory failure due to COPD (HCC)- (present on admission)   Assessment & Plan    RT protocol with as needed bronchodilators continued home inhalers        COPD (chronic obstructive pulmonary disease) due to alpha-1 antitrypsin deficiency (HCC)- (present on admission)   Assessment & Plan    No acute exacerbation on admission  RT protocol and breathing treatments as needed  Solu-Medrol 62.5 mg IV every 8 hours        Alpha-1-antitrypsin deficiency (HCC)- (present on admission)   Assessment & Plan    Every Thursday she receives IV Prolastin  Will  need a PICC line as her prior port has been removed        Paroxysmal atrial fibrillation (HCC)- (present on admission)   Assessment & Plan    On diltiazem and metoprolol  Monitor on telemetry  Holding anticoagulant for now will confirm on when we can reinitiate with vascular surgeon          Quality-Core Measures   Reviewed items::  Radiology images reviewed, Labs reviewed and Medications reviewed  DVT prophylaxis - mechanical:  SCDs  Antibiotics:  Treating active infection/contamination beyond 24 hours perioperative coverage

## 2018-09-17 NOTE — PROGRESS NOTES
Pulmonary Critical Care Progress Note        Chief Complaint: shortness of breath    Admission date: 9/14/2018    History of Present Illness: 68 y.o. female with a past medical history of alpha-1 antitrypsin deficiency on Prolastin intravenously q. weekly, COPD on home oxygen at 5 L/min nasal cannula, atrial fibrillation on Pradaxa who was evaluated at [facility] on September 14 complaining of shortness of breath and found to have evidence of pneumonia with fever, leukocytosis, abnormal chest x-ray findings.  She was started on antibiotics to cover for community acquired pneumonia.  Her blood cultures grew out staph epidermidis and the concern was that her port was infected and needed to be removed.  Removal was attempted by a surgeon at [facility] but unfortunately became sheared resulting in a retained catheter in the vessel.  She was transferred to [facility] for definitive care and seen by Dr. Vidal Gil who took her to the operating room today where she underwent successful retrieval of the catheter from the left subclavian/innominate vein but did require transection of the clavicle and repair of the subclavian vein followed by plating of the clavicle by orthopedics.  She was brought to the intensive care unit postprocedure for ongoing respiratory failure management and I was consulted by Dr. Ghotra.    ROS:  Respiratory: negative pleuritic chest pain, positive shortness of breath, negative sputum production and negative wheezing, Cardiac: negative, GI: negative.  All other systems negative.    Interval Events:  Patient on high flow nasal cannula due to acute on chronic hypoxia  Transferred back to ICU in past 24 hours because of hypoxia  Denies aspiration  Diuresed and improved slightly  Will give additional dose of lasix 20 mg once and re-evaluate, on 20 mg bid. Goal neg 1-2 Liters next 24 hours.  Continue to wean high flow for target sao2 > 89%  Up to chair if tolerates  Pending KRISSY,  will follow up with cardiology, vegetation noted outside echocardiogram  Reduced steroids today.    PFSH:  No change.    Respiratory:     Pulse Oximetry: 94 %  Chest Tube Drains:          Exam: tachypnea and labored breathing  ImagingCXR  I have personally reviewed the chest x-ray my impression is   continued bilateral edema noted and CT chest Reviewed   Recent Labs      09/15/18   1705  09/15/18   1840  09/15/18   2040  09/16/18   1453   [id number]   --    --    --   7.47   [id number]   --    --    --   47.2*   [id number]   --    --    --   52.4*   VCOA0DON   --    --    --   89.3*   ARTHCO3   --    --    --   34*   ARTBE   --    --    --   9*   ISTATAPH  7.283*  7.353*  7.403   --    ISTATAPCO2  81.9*  64.8*  53.7*   --    ISTATAPO2  72  67  53*   --    ISTATATCO2  41*  38*  35*   --    [id number]  91*  91*  86*   --    ISTATARTHCO3  38.7*  36.0*  33.5*   --    ISTATARTBE  9*  8*  7*   --    ISTATTEMP  see below  97.5 F  98.5 F   --    ISTATFIO2  50  50  40   --    ISTATSPEC  Arterial  Arterial  Arterial   --    ISTATAPHTC   --   7.362*  7.404   --    [id number]   --   64  52*   --        HemoDynamics:  Pulse: 74, Heart Rate (Monitored): 75  Blood Pressure : 119/56, NIBP: 146/65       Exam: tachycardia  Imaging: Available data reviewed        Neuro:  GCS Total [place] Coma Score: 15       Exam: no focal deficits noted mental status intact  Imaging: Available data reviewed    Fluids:  Intake/Output       09/15/18 0700 - 09/16/18 0659 09/16/18 0700 - 09/17/18 0659 09/17/18 0700 - 09/18/18 0659      5323-5107 6482-8142 Total 9715-0027 1956-4261 Total 4644-8572 4452-6314 Total       Intake    P.O.  0  -- 0  920  360 1280  --  -- --    P.O. 0 -- 0  -- -- --    I.V.  726.4  1128.6 1855.1  259  300 559  --  -- --    Propofol Volume 60.4 48.6 109.1 -- -- -- -- -- --    IV Piggyback Volume (IV Piggyback) -- 250 250 -- 300 300 -- -- --    IV Volume (Normal saline) 166 830 996 259 -- 259 -- -- --    IV Volume (NS  bolus) 500 -- 500 -- -- -- -- -- --    Other  --  150 150  --  -- --  --  -- --    Medications (P.O./ Enteral Liquids) -- 150 150 -- -- -- -- -- --    Total Intake 726.4 1278.6 2005.1 0325 310 9088 -- -- --       Output    Urine  515  1625 2140  775  800 1575  --  -- --    Number of Times Voided 1 x -- 1 x 3 x -- 3 x -- -- --    Urine Void (mL) (non-catheter) 500 -- 500  -- -- --    Output (mL) ([REMOVED] Urinary Catheter Indwelling Catheter 16) 15 1625 1640 75 -- 75 -- -- --    Drains  30  30 60  --  -- --  --  -- --    Output (ml) ([REMOVED] Surgical Drain Group Left;Upper;Chest Maged Frances) 30 30 60 -- -- -- -- -- --    Stool  --  -- --  --  -- --  --  -- --    Number of Times Stooled -- -- -- 1 x -- 1 x -- -- --    Total Output 545 1655 2200  -- -- --       Net I/O     181.4 -376.4 -194.9 404 -140 264 -- -- --        Weight: 74 kg (163 lb 2.3 oz)  Recent Labs      09/15/18   0345  09/16/18   0434   SODIUM  134*  138   POTASSIUM  4.0  4.4   CHLORIDE  95*  99   CO2  36*  33   BUN  18  12   CREATININE  0.40*  0.38*   MAGNESIUM   --   2.2   PHOSPHORUS   --   4.2   CALCIUM  8.8  8.3*       GI/Nutrition:  Exam: abdomen is soft and non-tender, nontender, without masses or organomegaly  Imaging: Available data reviewed  NPO for procedure  Liver Function  Recent Labs      09/15/18   0345  09/16/18   0434   ALTSGPT  24   --    ASTSGOT  11*   --    ALKPHOSPHAT  53   --    TBILIRUBIN  0.4   --    GLUCOSE  129*  177*       Heme:  Recent Labs      09/15/18   0345  09/16/18   04318   0525   RBC  4.93  4.68  4.42   HEMOGLOBIN  12.8  12.7  11.5*   HEMATOCRIT  42.3  40.4  37.9   PLATELETCT  279  268  267       Infectious Disease:  Temp  Av.6 °C (97.9 °F)  Min: 36 °C (96.8 °F)  Max: 37.6 °C (99.6 °F)  Micro: antibiotics reviewed and cultures reviewed  Recent Labs      09/15/18   0345  18   0434  18   0525   WBC  19.7*  18.5*  22.6*   NEUTSPOLYS  70.80  92.30*  90.50*    LYMPHOCYTES  18.10*  4.60*  4.40*   MONOCYTES  9.30  2.20  3.90   EOSINOPHILS  0.90  0.00  0.00   BASOPHILS  0.20  0.10  0.30   ASTSGOT  11*   --    --    ALTSGPT  24   --    --    ALKPHOSPHAT  53   --    --    TBILIRUBIN  0.4   --    --      Current Facility-Administered Medications   Medication Dose Frequency Provider Last Rate Last Dose   • metoprolol (LOPRESSOR) tablet 100 mg  100 mg TWICE DAILY Vidal Ghotra M.D.   100 mg at 09/17/18 0518   • ipratropium-albuterol (DUONEB) nebulizer solution  3 mL Q4HRS (RT) Jeremy M Gonda, M.D.   3 mL at 09/17/18 0227   • DILTIAZem CD (CARDIZEM CD) capsule 360 mg  360 mg DAILY Jeremy M Gonda, M.D.   360 mg at 09/17/18 0519   • albuterol inhaler 2 Puff  2 Puff Q4HRS PRN Jeremy M Gonda, M.D.   2 Puff at 09/16/18 1252   • furosemide (LASIX) injection 20 mg  20 mg BID DIURETIC Jeremy M Gonda, M.D.   20 mg at 09/17/18 0518   • sodium bicarbonate 8.4 % injection 3 mL  3 mL Q4HRS (RT) Jeremy M Gonda, M.D.   3 mL at 09/17/18 0227   • acetaminophen (TYLENOL) tablet 650 mg  650 mg Q6HRS PRN Jeremy M Gonda, M.D.   650 mg at 09/16/18 1642   • MD Alert...Vancomycin per Pharmacy   pharmacy to dose Rahul Burger M.D.       • vancomycin 1,400 mg in  mL IVPB  20 mg/kg Q24HR Rahul Burger M.D.   Stopped at 09/17/18 0245   • heparin injection 5,000 Units  5,000 Units Q8HRS Max Romero M.D.   5,000 Units at 09/17/18 0519   • ipratropium-albuterol (DUONEB) nebulizer solution  3 mL Q2HRS PRN (RT) Woo M Gonda, M.D.       • methylPREDNISolone sod succ (SOLU-MEDROL) 125 MG injection 62.5 mg  62.5 mg Q8HRS Jeremy M Gonda, M.D.   62.5 mg at 09/17/18 0519   • guaiFENesin (ROBITUSSIN) 100 MG/5ML solution 200 mg  10 mL Q4HRS Jeremy M Gonda, M.D.   200 mg at 09/17/18 0518   • simvastatin (ZOCOR) tablet 10 mg  10 mg Q EVENING Vidal Ghotra M.D.   Stopped at 09/15/18 1800   • senna-docusate (PERICOLACE or SENOKOT S) 8.6-50 MG per tablet 2 Tab  2 Tab BID Rahul Burger M.D.   2 Tab  at 09/17/18 0518    And   • polyethylene glycol/lytes (MIRALAX) PACKET 1 Packet  1 Packet QDAY PRN Rahul Burger M.D.        And   • magnesium hydroxide (MILK OF MAGNESIA) suspension 30 mL  30 mL QDAY PRN Rahul Burger M.D.   30 mL at 09/16/18 1349    And   • bisacodyl (DULCOLAX) suppository 10 mg  10 mg QDAY PRN Rahul Bruger M.D.       • ondansetron (ZOFRAN) syringe/vial injection 4 mg  4 mg Q4HRS PRN Rahul Burger M.D.       • ondansetron (ZOFRAN ODT) dispertab 4 mg  4 mg Q4HRS PRN Vidal Ghotra M.D.       • Pharmacy Consult Request ...Pain Management Review   PRN Rahul Burger M.D.        And   • oxyCODONE immediate-release (ROXICODONE) tablet 5 mg  5 mg Q3HRS PRN Rahul Burger M.D.        And   • oxyCODONE immediate release (ROXICODONE) tablet 10 mg  10 mg Q3HRS PRN Rahul Burger M.D.   10 mg at 09/17/18 0518    And   • HYDROmorphone (DILAUDID) injection 0.5 mg  0.5 mg Q3HRS PRN Rahul Burger M.D.   0.5 mg at 09/16/18 1620   • budesonide-formoterol (SYMBICORT) 160-4.5 MCG/ACT inhaler 2 Puff  2 Puff Q12HRS Rahul Burger M.D.   2 Puff at 09/17/18 0518   • tramadol (ULTRAM) 50 MG tablet 50 mg  50 mg Q4HRS PRN Rahul Burger M.D.   50 mg at 09/16/18 1836   • Respiratory Care per Protocol   Continuous RT Rahul Burger M.D.         Last reviewed on 9/15/2018  9:57 PM by Dee Bassett R.N.    Quality  Measures:  Core Measures & Quality Metrics        Assessment / Plan  Acute on chronic hypoxemic respiratory failure -intubated perioperatively 9/15, on high flow nasal cannula for hypoxia.  Likely due to acute on chronic exac copd/alpha antitrypsin def and hypervolemia.  Avoid sedatives.  Pending KRISSY for vegetation.  Duonebs q 4.  On spiriva but hold until can use adequately.  Pulmicort bid. Hold symbicort.  Incentive spirometry, out of bed.  Given 20 mg lasix this am goal diuresis 1-2 l net negative, on 20 mg bid.  Reduce solumedrol to 40 mg tid.    Alpha-1 antitrypsin  deficiency/COPD on chronic 5 L/min nasal cannula 24 hours a day with acute exacerbation, prolastin pending, gets Thursday dosing weekly, missed last weeks dose    Valvular lesion, pending KRISSY, continue current ab, staph epi bacteremia    Community acquired pneumonia, continue antibiotics, staph epidermidis bacteremia    Retained port catheter status post retrieval/left clavicle transection/subclavian vein repair 9/15              - surgical site management, MILDRED drain              - analgesia    GERD: ppi    Atrial fibrillation, chronic anticoagulation, rate cont, on dilt and bb, holding xarelto post operatively.    Tobacco dependence - nicotine patch    Sepsis, resolved    Hyponatremia - improving            HLD - statin    Prophylaxis, diet      Discussed patient condition and risk of morbidity and/or mortality with Family, RN, RT, Pharmacy, Charge nurse / hot rounds and hospitalist.      The patient remains critically ill.  Critical care time = 40 minutes in directly providing and coordinating critical care and extensive data review.  No time overlap and excludes procedures.

## 2018-09-17 NOTE — CARE PLAN
Problem: Communication  Goal: The ability to communicate needs accurately and effectively will improve  Outcome: PROGRESSING AS EXPECTED  Patient communicates needs accurately and effectively.    Problem: Venous Thromboembolism (VTW)/Deep Vein Thrombosis (DVT) Prevention:  Goal: Patient will participate in Venous Thrombosis (VTE)/Deep Vein Thrombosis (DVT)Prevention Measures  Outcome: PROGRESSING AS EXPECTED  Patient cooperative with SCD's and heparin injections.

## 2018-09-17 NOTE — PROGRESS NOTES
Infectious Disease Progress Note    Author: Cari Sandhu M.D. Date & Time of service: 2018  12:31 PM    Chief Complaint:  Follow-up of staph epidermidis bacteremia    Interval History:   afebrile, white count down some from 19.5 K to 18.5 K.  Repeat blood cultures are no growth to date.  Extubated this morning, patient feels okay, minimal pain breathing fine.  -remains afebrile.  Still on high flow oxygen.  Says she feels better but still cannot catch her breath.  WBC count is 22.6 creatinine 0.35 denies any pain at the site of port      Labs Reviewed, Medications Reviewed and Radiology Reviewed.    Review of Systems:  Review of Systems   Constitutional: Negative for chills, fever and weight loss.   Respiratory: Positive for shortness of breath. Negative for cough.    Cardiovascular: Negative for chest pain.   Gastrointestinal: Negative for abdominal pain, diarrhea, nausea and vomiting.   Genitourinary: Negative for dysuria.   Musculoskeletal: Negative for joint pain.   Skin: Negative for rash.   Neurological: Negative for focal weakness and headaches.   All other systems reviewed and are negative.      Hemodynamics:  Temp (24hrs), Av.6 °C (97.8 °F), Min:35.8 °C (96.4 °F), Max:37.6 °C (99.6 °F)  Temperature: (!) 35.8 °C (96.4 °F)  Pulse  Av.9  Min: 60  Max: 120Heart Rate (Monitored): 61  Blood Pressure : 119/56, NIBP: 144/63       Physical Exam:  Physical Exam   Constitutional: She is oriented to person, place, and time. She appears well-developed and well-nourished. No distress.   HENT:   Head: Normocephalic and atraumatic.   Nose: Nose normal.   Mouth/Throat: No oropharyngeal exudate.   Cushingoid facies   Eyes: Pupils are equal, round, and reactive to light. Conjunctivae and EOM are normal. Right eye exhibits no discharge. Left eye exhibits no discharge. No scleral icterus.   Neck: Neck supple. No JVD present.   Cardiovascular: Normal rate, regular rhythm, normal heart sounds and intact  distal pulses.  Exam reveals no gallop and no friction rub.    No murmur heard.  Pulmonary/Chest: Effort normal. No respiratory distress. She has no wheezes. She has rales.   The site of previous port on the left chest is clean.  Steri-Strips present   Abdominal: Soft. Bowel sounds are normal. She exhibits no distension. There is no tenderness. There is no rebound.   Musculoskeletal: Normal range of motion. She exhibits no edema.   Lymphadenopathy:     She has no cervical adenopathy.   Neurological: She is alert and oriented to person, place, and time.   No gross cranial nerve deficit, no FND   Skin: Skin is warm and dry.   Psychiatric: She has a normal mood and affect. Her behavior is normal.   Vitals reviewed.      Meds:    Current Facility-Administered Medications:   •  metoprolol  •  ipratropium-albuterol  •  DILTIAZem CD  •  albuterol  •  furosemide  •  sodium bicarbonate  •  acetaminophen  •  MD Alert...Vancomycin per Pharmacy  •  vancomycin  •  heparin  •  ipratropium-albuterol  •  methylPREDNISolone  •  guaiFENesin  •  simvastatin  •  senna-docusate **AND** polyethylene glycol/lytes **AND** magnesium hydroxide **AND** bisacodyl  •  ondansetron  •  ondansetron  •  Notify provider if pain remains uncontrolled **AND** Use the numeric rating scale (NRS-11) on regular floors and Critical-Care Pain Observation Tool (CPOT) on ICUs/Trauma to assess pain **AND** Pulse Ox (Oximetry) **AND** Pharmacy Consult Request **AND** If patient difficult to arouse and/or has respiratory depression, stop any opiates that are currently infusing and call a Rapid Response. **AND** oxyCODONE immediate-release **AND** oxyCODONE immediate-release **AND** HYDROmorphone  •  budesonide-formoterol  •  tramadol  •  Respiratory Care per Protocol    Labs:  Recent Labs      09/15/18   0345  09/16/18   0434  09/17/18   0525   WBC  19.7*  18.5*  22.6*   RBC  4.93  4.68  4.42   HEMOGLOBIN  12.8  12.7  11.5*   HEMATOCRIT  42.3  40.4  37.9   MCV   85.8  86.3  85.7   MCH  26.0*  27.1  26.0*   RDW  47.2  47.4  46.5   PLATELETCT  279  268  267   MPV  9.8  9.6  10.4   NEUTSPOLYS  70.80  92.30*  90.50*   LYMPHOCYTES  18.10*  4.60*  4.40*   MONOCYTES  9.30  2.20  3.90   EOSINOPHILS  0.90  0.00  0.00   BASOPHILS  0.20  0.10  0.30     Recent Labs      09/15/18   0345  09/16/18   0434  09/17/18   0525   SODIUM  134*  138  136   POTASSIUM  4.0  4.4  4.0   CHLORIDE  95*  99  98   CO2  36*  33  32   GLUCOSE  129*  177*  210*   BUN  18  12  15     Recent Labs      09/15/18   0345  09/16/18   0434  09/17/18   0525   ALBUMIN  3.3   --   3.1*   TBILIRUBIN  0.4   --   0.3   ALKPHOSPHAT  53   --   57   TOTPROTEIN  5.6*   --   5.4*   ALTSGPT  24   --   17   ASTSGOT  11*   --   10*   CREATININE  0.40*  0.38*  0.35*       Imaging:  Dx-chest-portable (1 View)    Result Date: 9/16/2018 9/16/2018 3:48 AM HISTORY/REASON FOR EXAM:  For indication of respiratory failure TECHNIQUE/EXAM DESCRIPTION AND NUMBER OF VIEWS: Single portable view of the chest. COMPARISON: Yesterday FINDINGS: The cardiomediastinal silhouettes are unchanged. Scattered bilateral interstitial opacities persist, in addition to left basilar atelectasis. Minimal atelectasis is also now developed in the right lung base. There is a small left pleural effusion. No pneumothorax. Endotracheal tube is no longer present.     New right basilar atelectasis. No significant change otherwise.    Dx-chest-portable (1 View)    Result Date: 9/15/2018  9/15/2018 4:46 PM HISTORY/REASON FOR EXAM:  POST INTUBATION. TECHNIQUE/EXAM DESCRIPTION AND NUMBER OF VIEWS: Single portable view of the chest. COMPARISON: 9/15/2018 FINDINGS: Cardiac mediastinal contour is unchanged. Lungs show hypoinflation. Increased opacity LEFT lung base with blunting of the costophrenic angle. No pneumothorax. Pulmonary vasculature is prominent. Endotracheal tube in place with tip approximately 5 cm above the roxana. Interval removal of LEFT subclavian catheter.  Postoperative change of medial LEFT clavicle. Multiple surgical clips project over the LEFT upper chest.     1.  Supportive tubing as described above. 2.  Hypoinflation with worsening LEFT basilar atelectasis and pulmonary vascular congestion. 3.  Minimal LEFT pleural effusion again seen. 4.  No pneumothorax.    Dx-chest-portable (1 View)    Result Date: 9/15/2018  9/15/2018 10:35 AM HISTORY/REASON FOR EXAM:  Shortness of Breath. TECHNIQUE/EXAM DESCRIPTION AND NUMBER OF VIEWS: Single portable view of the chest. COMPARISON: None FINDINGS: Heart size is within normal limits.  There is left subclavian central line present with tip in expected location of the superior vena cava. There is mild diffuse interstitial prominence/vascular congestion. No pulmonary infiltrates or consolidations are noted. There are probable trace effusions. There is probable underlying COPD.     Mild diffuse interstitial prominence/vascular congestion with probable trace effusions.    Dx-clavicle Left    Result Date: 9/15/2018  9/15/2018 2:30 PM HISTORY/REASON FOR EXAM: . LEFT clavicle transection. TECHNIQUE/EXAM DESCRIPTION AND NUMBER OF VIEWS:  2 fluoroscopic views of the LEFT clavicle. COMPARISON: Chest x-ray 9/15/2018 FINDINGS: Images show internal fixation of medial LEFT clavicle with plate and multiple screws. 3 Seconds fluoroscopy time utilized.     Limited intraoperative images showing internal fixation of medial LEFT clavicle.      Micro:  Results     Procedure Component Value Units Date/Time    AFB CULTURE [[id number]] Collected:  09/15/18 1453    Order Status:  Completed Specimen:  Wound Updated:  09/17/18 1221     Significant Indicator NEG     Source WND     Site Subclavian Catheter     AFB Culture Culture in progress.     AFB Smear Results No acid fast bacilli seen.    CULTURE WOUND W/ GRAM STAIN [[id number]] Collected:  09/15/18 1453    Order Status:  Completed Specimen:  Wound Updated:  09/17/18 1221     Significant Indicator NEG  "    Source WND     Site Subclavian Catheter     Culture Result Wound No growth at 48 hours.     Gram Stain Result No organisms seen.    ANAEROBIC CULTURE [[id number]] Collected:  09/15/18 1453    Order Status:  Completed Specimen:  Wound Updated:  09/17/18 1221     Significant Indicator NEG     Source WND     Site Subclavian Catheter     Anaerobic Culture, Culture Res Culture in progress.    GRAM STAIN [[id number]] Collected:  09/15/18 1453    Order Status:  Completed Specimen:  Wound Updated:  09/16/18 1921     Significant Indicator .     Source WND     Site Subclavian Catheter     Gram Stain Result No organisms seen.    ACID FAST STAIN [[id number]] Collected:  09/15/18 1453    Order Status:  Completed Specimen:  Wound Updated:  09/16/18 1921     Significant Indicator NEG     Source WND     Site Subclavian Catheter     AFB Smear Results No acid fast bacilli seen.    Blood Culture [[id number]]     Order Status:  Canceled Specimen:  Blood from Peripheral     Blood Culture [[id number]]     Order Status:  Canceled Specimen:  Blood from Peripheral     BLOOD CULTURE [[id number]] Collected:  09/15/18 0936    Order Status:  Completed Specimen:  Blood from Peripheral Updated:  09/16/18 1017     Significant Indicator NEG     Source BLD     Site PERIPHERAL     Blood Culture No Growth    Note: Blood cultures are incubated for 5 days and  are monitored continuously.Positive blood cultures  are called to the RN and reported as soon as  they are identified.      Narrative:       Per Hospital Policy: Only change Specimen Src: to \"Line\" if  specified by physician order.    BLOOD CULTURE [[id number]] Collected:  09/15/18 0937    Order Status:  Completed Specimen:  Blood from Peripheral Updated:  09/16/18 1017     Significant Indicator NEG     Source BLD     Site PERIPHERAL     Blood Culture No Growth    Note: Blood cultures are incubated for 5 days and  are monitored continuously.Positive blood cultures  are called to the RN and reported " "as soon as  they are identified.      Narrative:       Per Hospital Policy: Only change Specimen Src: to \"Line\" if  specified by physician order.    FUNGAL CULTURE [[id number]] Collected:  09/15/18 1453    Order Status:  Completed Specimen:  Other Updated:  09/15/18 1841    Blood Culture [[id number]]     Order Status:  Canceled Specimen:  Blood from Peripheral     Blood Culture [[id number]]     Order Status:  Canceled Specimen:  Blood from Peripheral           Assessment:  Ms. Craig is a very pleasant 68-year-old white female who was originally admitted to the hospital on 09/14/2018 due to increasing   shortness of breath x 1 day. She has a past medical history significant for alpha-1 antitrypsin deficiency on weekly Prolastin infusions, and COPD on 5 L nasal cannula at home, A. fib on Pradaxa. She was transferred from [facility] where she presented with fever, leukocytosis, and sepsis initially thought to be due to pneumonia.  She was started on antibiotics for CAP and treated for COPD exacerbation.  She subsequently grew Staph epidermidis from her blood cultures.  Patient had an indwelling PowerPort and removal was attempted at OSH, but complicated by retained catheter.  She was transferred to [facility], and underwent successful retrieval of the catheter from the left subclavian, but it required transection of the clavicle and repair of the subclavian vein followed by plating of the clavicle by orthopedics.    She is currently on vancomycin, tolerating well.  We will plan to treat with 10 days of IV antibiotics.    Active Hospital Problems    Diagnosis   • *Staphylococcus epidermidis bacteremia, likely due to catheter related bloodstream infection/infected MediPort [R78.81]   • Infected venous access port [T80.219A]   • Sepsis due to CAP, CRBSI ([facility]) [A41.9]   • CAP (community acquired pneumonia) [J18.9]   • Paroxysmal atrial fibrillation ([facility]) [I48.0]   • Alpha-1-antitrypsin deficiency ([facility]) [E88.01]   • COPD " (chronic obstructive pulmonary disease) due to alpha-1 antitrypsin deficiency ([facility]) [J44.9]   • Chronic hypoxemic respiratory failure due to COPD ([facility]) [J96.11]   • Tobacco dependence [F17.200]   • GERD (gastroesophageal reflux disease) [K21.9]   • Acquired circulating anticoagulants ([facility]) [D68.318]   • Acute on chronic respiratory failure with hypoxia ([facility]) [J96.21]       Plan:  Sepsis, Staph epidermidis bacteremia, presumed due to infected port  -Chest x-ray here with no definitive evidence of pneumonia per my read. The plated left clavicle is seen  -Complicated removal attempt at OS, retrieved successfully at [facility] on 9/15 -required transection of the clavicle and repair of the subclavian vein followed by plating of the clavicle  -Continue IV vancomycin  -Blood cultures are negative from 9/15/2018  Check an echocardiogram    Alpha-1 antitrypsin deficiency  -On weekly Prolastin infusions  -Would hold on replacing a surgically implanted port until the end of antibiotic therapy  -Hold PICC while repeat blood cultures are pending    COPD on home oxygen, chronic hypoxemic respiratory failure  - Back to baseline    Leukocytosis-  More likely due to methylprednisolone    ID will follow.  Please call with questions    Discussed with the team

## 2018-09-17 NOTE — TELEPHONE ENCOUNTER
----- Message from MARCELA Cox sent at 9/15/2018  1:46 PM PDT -----  Good morning  CT should understand requested KRISSY for bacteremia on Monday 9/17/18 this

## 2018-09-18 ENCOUNTER — APPOINTMENT (OUTPATIENT)
Dept: RADIOLOGY | Facility: MEDICAL CENTER | Age: 68
DRG: 270 | End: 2018-09-18
Attending: INTERNAL MEDICINE
Payer: MEDICARE

## 2018-09-18 LAB
ANION GAP SERPL CALC-SCNC: 12 MMOL/L (ref 0–11.9)
ANION GAP SERPL CALC-SCNC: 3 MMOL/L (ref 0–11.9)
BACTERIA WND AEROBE CULT: NORMAL
BASOPHILS # BLD AUTO: 0.2 % (ref 0–1.8)
BASOPHILS # BLD: 0.04 K/UL (ref 0–0.12)
BUN SERPL-MCNC: 19 MG/DL (ref 8–22)
BUN SERPL-MCNC: 21 MG/DL (ref 8–22)
CALCIUM SERPL-MCNC: 8.7 MG/DL (ref 8.5–10.5)
CALCIUM SERPL-MCNC: 8.7 MG/DL (ref 8.5–10.5)
CHLORIDE SERPL-SCNC: 95 MMOL/L (ref 96–112)
CHLORIDE SERPL-SCNC: 99 MMOL/L (ref 96–112)
CO2 SERPL-SCNC: 31 MMOL/L (ref 20–33)
CO2 SERPL-SCNC: 33 MMOL/L (ref 20–33)
CREAT SERPL-MCNC: 0.41 MG/DL (ref 0.5–1.4)
CREAT SERPL-MCNC: 0.6 MG/DL (ref 0.5–1.4)
EOSINOPHIL # BLD AUTO: 0 K/UL (ref 0–0.51)
EOSINOPHIL NFR BLD: 0 % (ref 0–6.9)
ERYTHROCYTE [DISTWIDTH] IN BLOOD BY AUTOMATED COUNT: 46.4 FL (ref 35.9–50)
GLUCOSE SERPL-MCNC: 206 MG/DL (ref 65–99)
GLUCOSE SERPL-MCNC: 278 MG/DL (ref 65–99)
GRAM STN SPEC: NORMAL
GRAM STN SPEC: NORMAL
HCT VFR BLD AUTO: 37.8 % (ref 37–47)
HGB BLD-MCNC: 11.6 G/DL (ref 12–16)
IMM GRANULOCYTES # BLD AUTO: 0.19 K/UL (ref 0–0.11)
IMM GRANULOCYTES NFR BLD AUTO: 0.9 % (ref 0–0.9)
LYMPHOCYTES # BLD AUTO: 0.8 K/UL (ref 1–4.8)
LYMPHOCYTES NFR BLD: 3.8 % (ref 22–41)
MAGNESIUM SERPL-MCNC: 2.2 MG/DL (ref 1.5–2.5)
MAGNESIUM SERPL-MCNC: 2.3 MG/DL (ref 1.5–2.5)
MCH RBC QN AUTO: 26.2 PG (ref 27–33)
MCHC RBC AUTO-ENTMCNC: 30.7 G/DL (ref 33.6–35)
MCV RBC AUTO: 85.3 FL (ref 81.4–97.8)
MONOCYTES # BLD AUTO: 0.85 K/UL (ref 0–0.85)
MONOCYTES NFR BLD AUTO: 4.1 % (ref 0–13.4)
NEUTROPHILS # BLD AUTO: 18.95 K/UL (ref 2–7.15)
NEUTROPHILS NFR BLD: 91 % (ref 44–72)
NRBC # BLD AUTO: 0 K/UL
NRBC BLD-RTO: 0 /100 WBC
PHOSPHATE SERPL-MCNC: 3.1 MG/DL (ref 2.5–4.5)
PHOSPHATE SERPL-MCNC: 3.5 MG/DL (ref 2.5–4.5)
PLATELET # BLD AUTO: 271 K/UL (ref 164–446)
PMV BLD AUTO: 9.8 FL (ref 9–12.9)
POTASSIUM SERPL-SCNC: 3.8 MMOL/L (ref 3.6–5.5)
POTASSIUM SERPL-SCNC: 4 MMOL/L (ref 3.6–5.5)
RBC # BLD AUTO: 4.43 M/UL (ref 4.2–5.4)
SIGNIFICANT IND 70042: NORMAL
SIGNIFICANT IND 70042: NORMAL
SITE SITE: NORMAL
SITE SITE: NORMAL
SODIUM SERPL-SCNC: 135 MMOL/L (ref 135–145)
SODIUM SERPL-SCNC: 138 MMOL/L (ref 135–145)
SOURCE SOURCE: NORMAL
SOURCE SOURCE: NORMAL
VANCOMYCIN TROUGH SERPL-MCNC: 10.6 UG/ML (ref 10–20)
WBC # BLD AUTO: 20.8 K/UL (ref 4.8–10.8)

## 2018-09-18 PROCEDURE — 99233 SBSQ HOSP IP/OBS HIGH 50: CPT | Performed by: INTERNAL MEDICINE

## 2018-09-18 PROCEDURE — 700111 HCHG RX REV CODE 636 W/ 250 OVERRIDE (IP): Performed by: HOSPITALIST

## 2018-09-18 PROCEDURE — A9270 NON-COVERED ITEM OR SERVICE: HCPCS | Performed by: HOSPITALIST

## 2018-09-18 PROCEDURE — 700102 HCHG RX REV CODE 250 W/ 637 OVERRIDE(OP): Performed by: HOSPITALIST

## 2018-09-18 PROCEDURE — 700105 HCHG RX REV CODE 258: Performed by: HOSPITALIST

## 2018-09-18 PROCEDURE — 700101 HCHG RX REV CODE 250: Performed by: INTERNAL MEDICINE

## 2018-09-18 PROCEDURE — 71275 CT ANGIOGRAPHY CHEST: CPT

## 2018-09-18 PROCEDURE — 700102 HCHG RX REV CODE 250 W/ 637 OVERRIDE(OP): Performed by: INTERNAL MEDICINE

## 2018-09-18 PROCEDURE — 87070 CULTURE OTHR SPECIMN AEROBIC: CPT

## 2018-09-18 PROCEDURE — 87186 SC STD MICRODIL/AGAR DIL: CPT

## 2018-09-18 PROCEDURE — 87077 CULTURE AEROBIC IDENTIFY: CPT

## 2018-09-18 PROCEDURE — A9270 NON-COVERED ITEM OR SERVICE: HCPCS | Performed by: INTERNAL MEDICINE

## 2018-09-18 PROCEDURE — 84100 ASSAY OF PHOSPHORUS: CPT

## 2018-09-18 PROCEDURE — 700111 HCHG RX REV CODE 636 W/ 250 OVERRIDE (IP): Performed by: INTERNAL MEDICINE

## 2018-09-18 PROCEDURE — 80048 BASIC METABOLIC PNL TOTAL CA: CPT

## 2018-09-18 PROCEDURE — 83735 ASSAY OF MAGNESIUM: CPT

## 2018-09-18 PROCEDURE — 94669 MECHANICAL CHEST WALL OSCILL: CPT

## 2018-09-18 PROCEDURE — 99233 SBSQ HOSP IP/OBS HIGH 50: CPT | Performed by: HOSPITALIST

## 2018-09-18 PROCEDURE — 94640 AIRWAY INHALATION TREATMENT: CPT

## 2018-09-18 PROCEDURE — 700117 HCHG RX CONTRAST REV CODE 255: Performed by: INTERNAL MEDICINE

## 2018-09-18 PROCEDURE — 770022 HCHG ROOM/CARE - ICU (200)

## 2018-09-18 PROCEDURE — A9270 NON-COVERED ITEM OR SERVICE: HCPCS | Performed by: SURGERY

## 2018-09-18 PROCEDURE — 700102 HCHG RX REV CODE 250 W/ 637 OVERRIDE(OP): Performed by: SURGERY

## 2018-09-18 PROCEDURE — 85025 COMPLETE CBC W/AUTO DIFF WBC: CPT

## 2018-09-18 PROCEDURE — 94668 MNPJ CHEST WALL SBSQ: CPT

## 2018-09-18 PROCEDURE — 87205 SMEAR GRAM STAIN: CPT

## 2018-09-18 PROCEDURE — 99291 CRITICAL CARE FIRST HOUR: CPT | Performed by: INTERNAL MEDICINE

## 2018-09-18 PROCEDURE — 71045 X-RAY EXAM CHEST 1 VIEW: CPT

## 2018-09-18 RX ORDER — FUROSEMIDE 10 MG/ML
40 INJECTION INTRAMUSCULAR; INTRAVENOUS ONCE
Status: COMPLETED | OUTPATIENT
Start: 2018-09-18 | End: 2018-09-18

## 2018-09-18 RX ORDER — DABIGATRAN ETEXILATE 150 MG/1
150 CAPSULE ORAL 2 TIMES DAILY
Status: DISCONTINUED | OUTPATIENT
Start: 2018-09-18 | End: 2018-09-22 | Stop reason: HOSPADM

## 2018-09-18 RX ORDER — OXYCODONE HYDROCHLORIDE 5 MG/1
5 TABLET ORAL
Status: DISCONTINUED | OUTPATIENT
Start: 2018-09-18 | End: 2018-09-22 | Stop reason: HOSPADM

## 2018-09-18 RX ORDER — TRAMADOL HYDROCHLORIDE 50 MG/1
50 TABLET ORAL EVERY 4 HOURS PRN
Status: DISCONTINUED | OUTPATIENT
Start: 2018-09-18 | End: 2018-09-22 | Stop reason: HOSPADM

## 2018-09-18 RX ORDER — METHYLPREDNISOLONE SODIUM SUCCINATE 40 MG/ML
40 INJECTION, POWDER, LYOPHILIZED, FOR SOLUTION INTRAMUSCULAR; INTRAVENOUS EVERY 12 HOURS
Status: DISCONTINUED | OUTPATIENT
Start: 2018-09-18 | End: 2018-09-19

## 2018-09-18 RX ORDER — ADENOSINE 3 MG/ML
INJECTION, SOLUTION INTRAVENOUS
Status: DISCONTINUED
Start: 2018-09-18 | End: 2018-09-18

## 2018-09-18 RX ORDER — FUROSEMIDE 10 MG/ML
40 INJECTION INTRAMUSCULAR; INTRAVENOUS
Status: DISCONTINUED | OUTPATIENT
Start: 2018-09-18 | End: 2018-09-19

## 2018-09-18 RX ORDER — HYDROMORPHONE HYDROCHLORIDE 2 MG/ML
0.5 INJECTION, SOLUTION INTRAMUSCULAR; INTRAVENOUS; SUBCUTANEOUS
Status: DISCONTINUED | OUTPATIENT
Start: 2018-09-18 | End: 2018-09-22 | Stop reason: HOSPADM

## 2018-09-18 RX ADMIN — IOHEXOL 75 ML: 350 INJECTION, SOLUTION INTRAVENOUS at 21:52

## 2018-09-18 RX ADMIN — OXYCODONE HYDROCHLORIDE 10 MG: 10 TABLET ORAL at 03:39

## 2018-09-18 RX ADMIN — ACETAMINOPHEN 650 MG: 325 TABLET, FILM COATED ORAL at 14:35

## 2018-09-18 RX ADMIN — IPRATROPIUM BROMIDE AND ALBUTEROL SULFATE 3 ML: .5; 3 SOLUTION RESPIRATORY (INHALATION) at 20:04

## 2018-09-18 RX ADMIN — OXYCODONE HYDROCHLORIDE 5 MG: 5 TABLET ORAL at 16:38

## 2018-09-18 RX ADMIN — METOPROLOL SUCCINATE 100 MG: 100 TABLET, FILM COATED, EXTENDED RELEASE ORAL at 17:35

## 2018-09-18 RX ADMIN — STANDARDIZED SENNA CONCENTRATE AND DOCUSATE SODIUM 2 TABLET: 8.6; 5 TABLET ORAL at 05:50

## 2018-09-18 RX ADMIN — SODIUM BICARBONATE 3 ML: 84 INJECTION, SOLUTION INTRAVENOUS at 20:04

## 2018-09-18 RX ADMIN — HYDROMORPHONE HYDROCHLORIDE 0.5 MG: 2 INJECTION INTRAMUSCULAR; INTRAVENOUS; SUBCUTANEOUS at 15:09

## 2018-09-18 RX ADMIN — GUAIFENESIN 1200 MG: 600 TABLET, EXTENDED RELEASE ORAL at 05:50

## 2018-09-18 RX ADMIN — GUAIFENESIN 1200 MG: 600 TABLET, EXTENDED RELEASE ORAL at 17:35

## 2018-09-18 RX ADMIN — DABIGATRAN ETEXILATE MESYLATE 150 MG: 150 CAPSULE ORAL at 17:35

## 2018-09-18 RX ADMIN — IPRATROPIUM BROMIDE AND ALBUTEROL SULFATE 3 ML: .5; 3 SOLUTION RESPIRATORY (INHALATION) at 06:35

## 2018-09-18 RX ADMIN — DABIGATRAN ETEXILATE MESYLATE 75 MG: 75 CAPSULE ORAL at 05:51

## 2018-09-18 RX ADMIN — SODIUM BICARBONATE 3 ML: 84 INJECTION, SOLUTION INTRAVENOUS at 15:17

## 2018-09-18 RX ADMIN — HYDROMORPHONE HYDROCHLORIDE 0.5 MG: 2 INJECTION INTRAMUSCULAR; INTRAVENOUS; SUBCUTANEOUS at 22:06

## 2018-09-18 RX ADMIN — SODIUM BICARBONATE 3 ML: 84 INJECTION, SOLUTION INTRAVENOUS at 06:35

## 2018-09-18 RX ADMIN — IPRATROPIUM BROMIDE AND ALBUTEROL SULFATE 3 ML: .5; 3 SOLUTION RESPIRATORY (INHALATION) at 03:04

## 2018-09-18 RX ADMIN — SODIUM BICARBONATE 3 ML: 84 INJECTION, SOLUTION INTRAVENOUS at 10:35

## 2018-09-18 RX ADMIN — IPRATROPIUM BROMIDE AND ALBUTEROL SULFATE 3 ML: .5; 3 SOLUTION RESPIRATORY (INHALATION) at 21:55

## 2018-09-18 RX ADMIN — METHYLPREDNISOLONE SODIUM SUCCINATE 40 MG: 40 INJECTION, POWDER, FOR SOLUTION INTRAMUSCULAR; INTRAVENOUS at 05:51

## 2018-09-18 RX ADMIN — METOPROLOL SUCCINATE 100 MG: 100 TABLET, FILM COATED, EXTENDED RELEASE ORAL at 05:51

## 2018-09-18 RX ADMIN — METHYLPREDNISOLONE SODIUM SUCCINATE 40 MG: 40 INJECTION, POWDER, FOR SOLUTION INTRAMUSCULAR; INTRAVENOUS at 17:35

## 2018-09-18 RX ADMIN — STANDARDIZED SENNA CONCENTRATE AND DOCUSATE SODIUM 2 TABLET: 8.6; 5 TABLET ORAL at 17:35

## 2018-09-18 RX ADMIN — SIMVASTATIN 10 MG: 10 TABLET, FILM COATED ORAL at 17:39

## 2018-09-18 RX ADMIN — DILTIAZEM HYDROCHLORIDE 360 MG: 360 CAPSULE, EXTENDED RELEASE ORAL at 05:51

## 2018-09-18 RX ADMIN — IPRATROPIUM BROMIDE AND ALBUTEROL SULFATE 3 ML: .5; 3 SOLUTION RESPIRATORY (INHALATION) at 10:35

## 2018-09-18 RX ADMIN — IPRATROPIUM BROMIDE AND ALBUTEROL SULFATE 3 ML: .5; 3 SOLUTION RESPIRATORY (INHALATION) at 15:17

## 2018-09-18 RX ADMIN — VANCOMYCIN HYDROCHLORIDE 1400 MG: 100 INJECTION, POWDER, LYOPHILIZED, FOR SOLUTION INTRAVENOUS at 01:09

## 2018-09-18 RX ADMIN — FUROSEMIDE 20 MG: 10 INJECTION, SOLUTION INTRAMUSCULAR; INTRAVENOUS at 05:51

## 2018-09-18 RX ADMIN — FUROSEMIDE 40 MG: 10 INJECTION, SOLUTION INTRAMUSCULAR; INTRAVENOUS at 08:09

## 2018-09-18 RX ADMIN — SODIUM BICARBONATE 3 ML: 84 INJECTION, SOLUTION INTRAVENOUS at 21:55

## 2018-09-18 RX ADMIN — HYDROMORPHONE HYDROCHLORIDE 0.5 MG: 2 INJECTION INTRAMUSCULAR; INTRAVENOUS; SUBCUTANEOUS at 08:10

## 2018-09-18 RX ADMIN — FUROSEMIDE 40 MG: 10 INJECTION, SOLUTION INTRAMUSCULAR; INTRAVENOUS at 15:09

## 2018-09-18 RX ADMIN — SODIUM BICARBONATE 3 ML: 84 INJECTION, SOLUTION INTRAVENOUS at 03:04

## 2018-09-18 ASSESSMENT — ENCOUNTER SYMPTOMS
HEADACHES: 0
HEMOPTYSIS: 0
SHORTNESS OF BREATH: 1
NECK PAIN: 0
WHEEZING: 1
ABDOMINAL PAIN: 0
VOMITING: 0
CHILLS: 0
COUGH: 1
FEVER: 0
SPUTUM PRODUCTION: 1
BACK PAIN: 0
COUGH: 0
NAUSEA: 0
FOCAL WEAKNESS: 0
DIARRHEA: 0
PALPITATIONS: 0
SPUTUM PRODUCTION: 0
WEIGHT LOSS: 0

## 2018-09-18 ASSESSMENT — PAIN SCALES - GENERAL
PAINLEVEL_OUTOF10: 2
PAINLEVEL_OUTOF10: 8
PAINLEVEL_OUTOF10: 8
PAINLEVEL_OUTOF10: 0
PAINLEVEL_OUTOF10: 8
PAINLEVEL_OUTOF10: 5
PAINLEVEL_OUTOF10: 7
PAINLEVEL_OUTOF10: 2
PAINLEVEL_OUTOF10: 3
PAINLEVEL_OUTOF10: 8
PAINLEVEL_OUTOF10: 4
PAINLEVEL_OUTOF10: 6
PAINLEVEL_OUTOF10: 2

## 2018-09-18 NOTE — CARE PLAN
Problem: Safety  Goal: Will remain free from injury    Intervention: Provide assistance with mobility  Patient instructed to call for assistance before attempting to mobilize to [facility]      Problem: Pain Management  Goal: Pain level will decrease to patient's comfort goal    Intervention: Follow pain managment plan developed in collaboration with patient and Interdisciplinary Team  Assess and medicate as needed for complaints of acute pain

## 2018-09-18 NOTE — DISCHARGE PLANNING
Care Transition Team Assessment    Information for this assessment was compiled from a bedside interview with the pt as well as chart information. The pt reports living in manufactured home with her spouse right next door to the pt's son and daughter-in-law in [place] [facility]. The pt report 1 step to get into the front of her home. Pt reports using 5 LPM of O2 at baseline. The pt states she received her O2 through provider, Darek as well as receiving HH through [facility]. Pt states her family can pick her up and take her back to [place]. The family will need a day's notice however. Pt reports having a dx of, Depression which she is prescribed medications from her PCP. The pt gets her prescriptions filled at, [facility] in [place]. The pt's discharge disposition will most likely be back home.     Information Source  Orientation : Oriented x 4  Information Given By: Patient  Informant's Name: Rafa Treadwell    Readmission Evaluation  Is this a readmission?: No    Elopement Risk  Legal Hold: No  Ambulatory or Self Mobile in Wheelchair: No-Not an Elopement Risk  Disoriented: No  Psychiatric Symptoms: None  History of Wandering: No  Elopement this Admit: No  Vocalizing Wanting to Leave: No  Displays Behaviors, Body Language Wanting to Leave: No-Not at Risk for Elopement  Elopement Risk: Not at Risk for Elopement    Interdisciplinary Discharge Planning  Does Admitting Nurse Feel This Could be a Complex Discharge?: No  Primary Care Physician: Dr. LUCIUS Yeboah  Lives with - Patient's Self Care Capacity: Spouse  Patient or legal guardian wants to designate a caregiver (see row info): No  Caregiver name: Negro Craig  Caregiver relationship to patient: Spouse  Caregiver contact info: [phone]  Support Systems: Spouse / Significant Other, Friends / Neighbors  Housing / Facility: 1 Story House  Do You Take your Prescribed Medications Regularly: Yes  Able to Return to Previous ADL's: Yes  Mobility Issues: Yes  Prior Services:  Meals on Wheels, Other (Comments)  Patient Expects to be Discharged to:: Home  Assistance Needed: Unknown at this Time  Durable Medical Equipment: Home Oxygen, Commode    Discharge Preparedness  What is your plan after discharge?: Uncertain - pending medical team collaboration  What are your discharge supports?: Child, Spouse  Prior Functional Level: Ambulatory, Independent with Activities of Daily Living, Independent with Medication Management  Difficulity with ADLs: None  Difficulity with IADLs: None    Functional Assesment  Prior Functional Level: Ambulatory, Independent with Activities of Daily Living, Independent with Medication Management    Finances  Financial Barriers to Discharge: No  Prescription Coverage: Yes    Vision / Hearing Impairment  Vision Impairment : Yes  Right Eye Vision: Impaired, Wears Glasses  Left Eye Vision: Impaired, Wears Glasses  Hearing Impairment : No    Values / Beliefs / Concerns  Values / Beliefs Concerns : No    Advance Directive  Advance Directive?: None  Advance Directive offered?: AD Booklet refused    Domestic Abuse  Have you ever been the victim of abuse or violence?: No  Physical Abuse or Sexual Abuse: No  Verbal Abuse or Emotional Abuse: No  Possible Abuse Reported to:: Not Applicable    Psychological Assessment  History of Substance Abuse: None  History of Psychiatric Problems: Yes (Depression)  Non-compliant with Treatment: No  Newly Diagnosed Illness: No    Discharge Risks or Barriers  Discharge risks or barriers?: Transportation, Post-acute placement / services  Patient risk factors: Mental health, Vulnerable adult, Lack of outside supports    Anticipated Discharge Information  Anticipated discharge disposition: Discharge needs currently unknown

## 2018-09-18 NOTE — CARE PLAN
Problem: Oxygenation:  Goal: Maintain adequate oxygenation dependent on patient condition  Outcome: PROGRESSING AS EXPECTED  Patient has been weaned to 50L/50% from 70L/100%    Problem: Bronchoconstriction:  Goal: Improve in air movement and diminished wheezing  Outcome: PROGRESSING AS EXPECTED  Bicarb and Duoneb Q4    Problem: Hyperinflation:  Goal: Prevent or improve atelectasis  Outcome: PROGRESSING AS EXPECTED  IS-1000 ml

## 2018-09-18 NOTE — PROGRESS NOTES
Infectious Disease Progress Note    Author: Cari Sandhu M.D. Date & Time of service: 2018  9:19 AM    Chief Complaint:  Follow-up of staph epidermidis bacteremia    Interval History:   afebrile, white count down some from 19.5 K to 18.5 K.  Repeat blood cultures are no growth to date.  Extubated this morning, patient feels okay, minimal pain breathing fine.  -remains afebrile.  Still on high flow oxygen.  Says she feels better but still cannot catch her breath.  WBC count is 22.6 creatinine 0.35 denies any pain at the site of port  2018 T-max 99.2 continues to have greenish phlegm.  Still on high flow oxygen.  Is on 5 L at home.  Overall feels tired but improved.  Denies any discharge from the port removal site        Labs Reviewed, Medications Reviewed and Radiology Reviewed.    Review of Systems:  Review of Systems   Constitutional: Negative for chills, fever and weight loss.   Respiratory: Positive for sputum production and shortness of breath. Negative for cough.    Cardiovascular: Negative for chest pain.   Gastrointestinal: Negative for abdominal pain, diarrhea, nausea and vomiting.   Genitourinary: Negative for dysuria.   Musculoskeletal: Negative for joint pain.   Skin: Negative for rash.   Neurological: Negative for focal weakness and headaches.   All other systems reviewed and are negative.      Hemodynamics:  Temp (24hrs), Av.9 °C (98.4 °F), Min:35.8 °C (96.4 °F), Max:37.3 °C (99.2 °F)  Temperature: 36.8 °C (98.3 °F)  Pulse  Av.9  Min: 59  Max: 120Heart Rate (Monitored): 84  NIBP: 160/70       Physical Exam:  Physical Exam   Constitutional: She is oriented to person, place, and time. She appears well-developed and well-nourished. No distress.   HENT:   Head: Normocephalic and atraumatic.   Nose: Nose normal.   Mouth/Throat: No oropharyngeal exudate.   Cushingoid facies   Eyes: Pupils are equal, round, and reactive to light. Conjunctivae and EOM are normal. Right eye  exhibits no discharge. Left eye exhibits no discharge. No scleral icterus.   Neck: Neck supple. No JVD present.   Cardiovascular: Normal rate, regular rhythm, normal heart sounds and intact distal pulses.  Exam reveals no gallop and no friction rub.    No murmur heard.  Pulmonary/Chest: Effort normal. No respiratory distress. She has no wheezes. She has rales.   The site of previous port on the left chest is clean.  Steri-Strips present   Abdominal: Soft. Bowel sounds are normal. She exhibits no distension. There is no tenderness. There is no rebound.   Musculoskeletal: Normal range of motion. She exhibits no edema.   Lymphadenopathy:     She has no cervical adenopathy.   Neurological: She is alert and oriented to person, place, and time.   No gross cranial nerve deficit, no FND   Skin: Skin is warm and dry.   Psychiatric: She has a normal mood and affect. Her behavior is normal.   Vitals reviewed.      Meds:    Current Facility-Administered Medications:   •  furosemide  •  methylPREDNISolone  •  metoprolol SR  •  guaiFENesin LA  •  dabigatran  •  ipratropium-albuterol  •  DILTIAZem CD  •  albuterol  •  sodium bicarbonate  •  acetaminophen  •  MD Alert...Vancomycin per Pharmacy  •  vancomycin  •  ipratropium-albuterol  •  simvastatin  •  senna-docusate **AND** polyethylene glycol/lytes **AND** magnesium hydroxide **AND** bisacodyl  •  ondansetron  •  ondansetron  •  Notify provider if pain remains uncontrolled **AND** Use the numeric rating scale (NRS-11) on regular floors and Critical-Care Pain Observation Tool (CPOT) on ICUs/Trauma to assess pain **AND** Pulse Ox (Oximetry) **AND** Pharmacy Consult Request **AND** If patient difficult to arouse and/or has respiratory depression, stop any opiates that are currently infusing and call a Rapid Response. **AND** oxyCODONE immediate-release **AND** oxyCODONE immediate-release **AND** HYDROmorphone  •  tramadol  •  Respiratory Care per Protocol    Labs:  Recent Labs       09/16/18   0434  09/17/18   0525  09/18/18   0540   WBC  18.5*  22.6*  20.8*   RBC  4.68  4.42  4.43   HEMOGLOBIN  12.7  11.5*  11.6*   HEMATOCRIT  40.4  37.9  37.8   MCV  86.3  85.7  85.3   MCH  27.1  26.0*  26.2*   RDW  47.4  46.5  46.4   PLATELETCT  268  267  271   MPV  9.6  10.4  9.8   NEUTSPOLYS  92.30*  90.50*  91.00*   LYMPHOCYTES  4.60*  4.40*  3.80*   MONOCYTES  2.20  3.90  4.10   EOSINOPHILS  0.00  0.00  0.00   BASOPHILS  0.10  0.30  0.20     Recent Labs      09/17/18   0525  09/17/18   2333  09/18/18   0540   SODIUM  136  138  135   POTASSIUM  4.0  3.8  4.0   CHLORIDE  98  95*  99   CO2  32  31  33   GLUCOSE  210*  278*  206*   BUN  15  21  19     Recent Labs      09/17/18   0525  09/17/18   2333  09/18/18   0540   ALBUMIN  3.1*   --    --    TBILIRUBIN  0.3   --    --    ALKPHOSPHAT  57   --    --    TOTPROTEIN  5.4*   --    --    ALTSGPT  17   --    --    ASTSGOT  10*   --    --    CREATININE  0.35*  0.60  0.41*       Imaging:  Dx-chest-portable (1 View)    Result Date: 9/16/2018 9/16/2018 3:48 AM HISTORY/REASON FOR EXAM:  For indication of respiratory failure TECHNIQUE/EXAM DESCRIPTION AND NUMBER OF VIEWS: Single portable view of the chest. COMPARISON: Yesterday FINDINGS: The cardiomediastinal silhouettes are unchanged. Scattered bilateral interstitial opacities persist, in addition to left basilar atelectasis. Minimal atelectasis is also now developed in the right lung base. There is a small left pleural effusion. No pneumothorax. Endotracheal tube is no longer present.     New right basilar atelectasis. No significant change otherwise.    Dx-chest-portable (1 View)    Result Date: 9/15/2018  9/15/2018 4:46 PM HISTORY/REASON FOR EXAM:  POST INTUBATION. TECHNIQUE/EXAM DESCRIPTION AND NUMBER OF VIEWS: Single portable view of the chest. COMPARISON: 9/15/2018 FINDINGS: Cardiac mediastinal contour is unchanged. Lungs show hypoinflation. Increased opacity LEFT lung base with blunting of the costophrenic  angle. No pneumothorax. Pulmonary vasculature is prominent. Endotracheal tube in place with tip approximately 5 cm above the roxana. Interval removal of LEFT subclavian catheter. Postoperative change of medial LEFT clavicle. Multiple surgical clips project over the LEFT upper chest.     1.  Supportive tubing as described above. 2.  Hypoinflation with worsening LEFT basilar atelectasis and pulmonary vascular congestion. 3.  Minimal LEFT pleural effusion again seen. 4.  No pneumothorax.    Dx-chest-portable (1 View)    Result Date: 9/15/2018  9/15/2018 10:35 AM HISTORY/REASON FOR EXAM:  Shortness of Breath. TECHNIQUE/EXAM DESCRIPTION AND NUMBER OF VIEWS: Single portable view of the chest. COMPARISON: None FINDINGS: Heart size is within normal limits.  There is left subclavian central line present with tip in expected location of the superior vena cava. There is mild diffuse interstitial prominence/vascular congestion. No pulmonary infiltrates or consolidations are noted. There are probable trace effusions. There is probable underlying COPD.     Mild diffuse interstitial prominence/vascular congestion with probable trace effusions.    Dx-clavicle Left    Result Date: 9/15/2018  9/15/2018 2:30 PM HISTORY/REASON FOR EXAM: . LEFT clavicle transection. TECHNIQUE/EXAM DESCRIPTION AND NUMBER OF VIEWS:  2 fluoroscopic views of the LEFT clavicle. COMPARISON: Chest x-ray 9/15/2018 FINDINGS: Images show internal fixation of medial LEFT clavicle with plate and multiple screws. 3 Seconds fluoroscopy time utilized.     Limited intraoperative images showing internal fixation of medial LEFT clavicle.      Micro:  Results     Procedure Component Value Units Date/Time    ANAEROBIC CULTURE [[id number]] Collected:  09/15/18 1453    Order Status:  Completed Specimen:  Wound Updated:  09/18/18 0818     Significant Indicator NEG     Source WND     Site Subclavian Catheter     Anaerobic Culture, Culture Res Culture in progress.    FUNGAL  CULTURE [[id number]] Collected:  09/15/18 1453    Order Status:  Completed Specimen:  Wound Updated:  09/18/18 0818     Significant Indicator NEG     Source WND     Site Subclavian Catheter     Fungal Culture Culture in progress.    AFB CULTURE [[id number]] Collected:  09/15/18 1453    Order Status:  Completed Specimen:  Wound Updated:  09/18/18 0818     Significant Indicator NEG     Source WND     Site Subclavian Catheter     AFB Culture Culture in progress.     AFB Smear Results No acid fast bacilli seen.    CULTURE WOUND W/ GRAM STAIN [[id number]] Collected:  09/15/18 1453    Order Status:  Completed Specimen:  Wound Updated:  09/18/18 0818     Gram Stain Result No organisms seen.     Significant Indicator NEG     Source WND     Site Subclavian Catheter     Culture Result Wound No growth at 72 hours.    GRAM STAIN [[id number]] Collected:  09/15/18 1453    Order Status:  Completed Specimen:  Wound Updated:  09/16/18 1921     Significant Indicator .     Source WND     Site Subclavian Catheter     Gram Stain Result No organisms seen.    ACID FAST STAIN [[id number]] Collected:  09/15/18 1453    Order Status:  Completed Specimen:  Wound Updated:  09/16/18 1921     Significant Indicator NEG     Source WND     Site Subclavian Catheter     AFB Smear Results No acid fast bacilli seen.    Blood Culture [[id number]]     Order Status:  Canceled Specimen:  Blood from Peripheral     Blood Culture [[id number]]     Order Status:  Canceled Specimen:  Blood from Peripheral     BLOOD CULTURE [[id number]] Collected:  09/15/18 0936    Order Status:  Completed Specimen:  Blood from Peripheral Updated:  09/16/18 1017     Significant Indicator NEG     Source BLD     Site PERIPHERAL     Blood Culture No Growth    Note: Blood cultures are incubated for 5 days and  are monitored continuously.Positive blood cultures  are called to the RN and reported as soon as  they are identified.      Narrative:       Per Hospital Policy: Only change Specimen  "Src: to \"Line\" if  specified by physician order.    BLOOD CULTURE [[id number]] Collected:  09/15/18 0937    Order Status:  Completed Specimen:  Blood from Peripheral Updated:  09/16/18 1017     Significant Indicator NEG     Source BLD     Site PERIPHERAL     Blood Culture No Growth    Note: Blood cultures are incubated for 5 days and  are monitored continuously.Positive blood cultures  are called to the RN and reported as soon as  they are identified.      Narrative:       Per Hospital Policy: Only change Specimen Src: to \"Line\" if  specified by physician order.    Blood Culture [[id number]]     Order Status:  Canceled Specimen:  Blood from Peripheral     Blood Culture [[id number]]     Order Status:  Canceled Specimen:  Blood from Peripheral           Assessment:  Ms. Craig is a very pleasant 68-year-old white female who was originally admitted to the hospital on 09/14/2018 due to increasing   shortness of breath x 1 day. She has a past medical history significant for alpha-1 antitrypsin deficiency on weekly Prolastin infusions, and COPD on 5 L nasal cannula at home, A. fib on Pradaxa. She was transferred from [facility] where she presented with fever, leukocytosis, and sepsis initially thought to be due to pneumonia.  She was started on antibiotics for CAP and treated for COPD exacerbation.  She subsequently grew Staph epidermidis from her blood cultures.  Patient had an indwelling PowerPort and removal was attempted at OSH, but complicated by retained catheter.  She was transferred to [facility], and underwent successful retrieval of the catheter from the left subclavian, but it required transection of the clavicle and repair of the subclavian vein followed by plating of the clavicle by orthopedics.    She is currently on vancomycin, tolerating well.  We will plan to treat with 10 days of IV antibiotics.    Active Hospital Problems    Diagnosis   • *Staphylococcus epidermidis bacteremia, likely due to catheter " related bloodstream infection/infected MediPort [R78.81]   • Infected venous access port [T80.219A]   • Sepsis due to CAP, CRBSI ([facility]) [A41.9]   • CAP (community acquired pneumonia) [J18.9]   • Paroxysmal atrial fibrillation ([facility]) [I48.0]   • Alpha-1-antitrypsin deficiency ([facility]) [E88.01]   • COPD (chronic obstructive pulmonary disease) due to alpha-1 antitrypsin deficiency ([facility]) [J44.9]   • Chronic hypoxemic respiratory failure due to COPD ([facility]) [J96.11]   • Tobacco dependence [F17.200]   • GERD (gastroesophageal reflux disease) [K21.9]   • Acquired circulating anticoagulants ([facility]) [D68.318]   • Acute on chronic respiratory failure with hypoxia ([facility]) [J96.21]       Plan:  Sepsis, Staph epidermidis bacteremia, presumed due to infected port  -Chest x-ray here with no definitive evidence of pneumonia per my read. The plated left clavicle is seen  -Complicated removal attempt at OS, retrieved successfully at [facility] on 9/15 -required transection of the clavicle and repair of the subclavian vein followed by plating of the clavicle  -Continue IV vancomycin  -Blood cultures are negative from 9/15/2018  Check an echocardiogram-has been ordered    Alpha-1 antitrypsin deficiency  -On weekly Prolastin infusions  -Would hold on replacing a surgically implanted port until the end of antibiotic therapy  -Hold PICC while repeat blood cultures are pending    COPD on home oxygen, chronic hypoxemic respiratory failure  Still on high flow oxygen  Check sputum cultures    Leukocytosis-  More likely due to methylprednisolone    ID will follow.  Please call with questions    Discussed with the team

## 2018-09-18 NOTE — CARE PLAN
Problem: Oxygenation:  Goal: Maintain adequate oxygenation dependent on patient condition  Outcome: PROGRESSING SLOWER THAN EXPECTED  Pt is on HHFNC  45LPM 50%    Problem: Bronchoconstriction:  Goal: Improve in air movement and diminished wheezing  Outcome: PROGRESSING SLOWER THAN EXPECTED  Duoneb Q4  Bicarb Q4    Problem: Hyperinflation:  Goal: Prevent or improve atelectasis  Outcome: PROGRESSING SLOWER THAN EXPECTED  CPT with vest QID, flutter valve QID, PEP QID  IS 1000 mL

## 2018-09-18 NOTE — CARE PLAN
Problem: Fluid Volume:  Goal: Will maintain balanced intake and output  Outcome: PROGRESSING SLOWER THAN EXPECTED      Problem: Pain Management  Goal: Pain level will decrease to patient's comfort goal  Outcome: PROGRESSING AS EXPECTED

## 2018-09-18 NOTE — PROGRESS NOTES
[facility] Hospitalist Progress Note    Date of Service: 2018    Chief Complaint  68 y.o. female admitted 2018 with shortness of breath    Ms Craig has a history of history of alpha-1 antitrypsin deficiency and chronic respiratory failure due to COPD, she is on 5L of O2 at baseline.  She presented to [facility] with pneumonia and sepsis.  Blood cultures were positive for staph epi.  Port removal was attempted but was unsuccessful.  She was transferred to [facility] for infectious disease consultation and vascular surgery availability.  On 9/15/18, she went to the OR for removal of infected retained catheter in the left subclavian vein requiring clavicle osteotomy.    Interval Problem Update  Alert and oriented  Remains on high flow O2, feels short of breath with exertion  Coughing a little, minimal sputum production, she has been afebrile  Given lasix this AM, total UOP 1.4L over the last 24 hours  Tolerating antibiotics, no signs of rash, no nausea or vomiting    Consultants/Specialty  Critical Care, I discussed the patient's condition with Dr. Mcmillan today on ICU Rounds    Disposition  Patient is on high flow oxygen, keep in the ICU  Her goal is to be discharged to her home in [place], [place], she will require an extended course of antibiotic      Review of Systems   Respiratory: Positive for cough, shortness of breath and wheezing. Negative for hemoptysis and sputum production.    Cardiovascular: Negative for chest pain and palpitations.   Gastrointestinal: Negative for abdominal pain, nausea and vomiting.   Musculoskeletal: Negative for back pain and neck pain.   Skin: Negative for itching and rash.      Physical Exam  Laboratory/Imaging   Hemodynamics  Temp (24hrs), Av.9 °C (98.4 °F), Min:35.8 °C (96.4 °F), Max:37.3 °C (99.2 °F)   Temperature: 36.8 °C (98.3 °F)  Pulse  Av.9  Min: 59  Max: 120 Heart Rate (Monitored): 84  NIBP: 160/70      Respiratory      Respiration: (!) 43, Pulse  Oximetry: 98 %, O2 Daily Delivery Respiratory : Highflow Nasal Cannula     Given By:: Mouthpiece, PEP/CPT Method: Flutter Valve, Work Of Breathing / Effort: Moderate  RUL Breath Sounds: Clear, RML Breath Sounds: Clear, RLL Breath Sounds: Diminished, OPAL Breath Sounds: Clear, LLL Breath Sounds: Diminished    Fluids    Intake/Output Summary (Last 24 hours) at 09/18/18 0908  Last data filed at 09/18/18 0800   Gross per 24 hour   Intake              745 ml   Output             2350 ml   Net            -1605 ml       Nutrition  Orders Placed This Encounter   Procedures   • Diet Order Cardiac     Standing Status:   Standing     Number of Occurrences:   1     Order Specific Question:   Diet:     Answer:   Cardiac [6]     Physical Exam   Constitutional: She is oriented to person, place, and time. She appears well-developed and well-nourished.   HENT:   Head: Normocephalic and atraumatic.   Eyes: Conjunctivae and EOM are normal. Right eye exhibits no discharge. Left eye exhibits no discharge.   Cardiovascular: Normal rate, regular rhythm and intact distal pulses.    No murmur heard.  2+ Radial Pulses  Brisk Capillary Refill   Pulmonary/Chest: Effort normal and breath sounds normal. No respiratory distress. She has no wheezes.   Incision at left clavicle, small amount of dried blood, well approximated and intact   Abdominal: Soft. Bowel sounds are normal. She exhibits no distension. There is no tenderness. There is no rebound.   Musculoskeletal: Normal range of motion. She exhibits no edema.   Neurological: She is alert and oriented to person, place, and time. No cranial nerve deficit.   Skin: Skin is warm and dry. She is not diaphoretic. No erythema.   Skin is warm and well perfused   Psychiatric: She has a normal mood and affect.       Recent Labs      09/16/18   0434  09/17/18   0525  09/18/18   0540   WBC  18.5*  22.6*  20.8*   RBC  4.68  4.42  4.43   HEMOGLOBIN  12.7  11.5*  11.6*   HEMATOCRIT  40.4  37.9  37.8   MCV   86.3  85.7  85.3   MCH  27.1  26.0*  26.2*   MCHC  31.4*  30.3*  30.7*   RDW  47.4  46.5  46.4   PLATELETCT  268  267  271   MPV  9.6  10.4  9.8     Recent Labs      09/17/18   0525  09/17/18   2333  09/18/18   0540   SODIUM  136  138  135   POTASSIUM  4.0  3.8  4.0   CHLORIDE  98  95*  99   CO2  32  31  33   GLUCOSE  210*  278*  206*   BUN  15  21  19   CREATININE  0.35*  0.60  0.41*   CALCIUM  8.5  8.7  8.7                      Assessment/Plan     * Staphylococcus epidermidis bacteremia, likely due to catheter related bloodstream infection/infected MediPort- (present on admission)   Assessment & Plan    On IV vancomycin  Dose of vancomycin will be titrated to serum concentration levels to ensure adequate levels and reduce risk of toxicity  Infectious disease physician consulting  Monitor repeat blood cultures  9/8 Echocardiogram from outside hospital showed thickened mitral valve leaflets.    Transesophageal echocardiogram will need to be reordered with cardiology once patient more stable          Sepsis due to CAP, CRBSI (HCC)- (present on admission)   Assessment & Plan    This is sepsis (without associated acute organ dysfunction).  Source is pneumonia, and infected venous port.  On vancomycin  Infectious disease consulting  Monitor CBC, vitals  R/O endocarditis with future KRISSY once more stable        Infected venous access port- (present on admission)   Assessment & Plan    Initially attempt at outside hospital of removal of the port but it sheared off and had to be sent to [facility] for vascular surgery to remove  Quite complicated surgery with fracture of the clavicle being required but successful removal of embedded port  vancomycin  Wound care        Acute on chronic respiratory failure with hypoxia (HCC)- (present on admission)   Assessment & Plan    On high flow oxygen, continue ICU care  Continue steroid  Diuresis as tolerated        CAP (community acquired pneumonia)- (present on admission)   Assessment &  Plan    Normal procalcitonin levels ×2        Acquired circulating anticoagulants (HCC)- (present on admission)   Assessment & Plan    Previously on Pradaxa prior to surgical procedures  Back on Pradaxa        GERD (gastroesophageal reflux disease)- (present on admission)   Assessment & Plan    Pepcid        Tobacco dependence- (present on admission)   Assessment & Plan    Ongoing encouragement to stop smoking        COPD (chronic obstructive pulmonary disease) due to alpha-1 antitrypsin deficiency (HCC)- (present on admission)   Assessment & Plan    Patient continues to require high flow oxygen  Continue Solu-Medrol        Alpha-1-antitrypsin deficiency (HCC)- (present on admission)   Assessment & Plan    Every Thursday she receives IV Prolastin  Will need a PICC line as her prior port has been removed        Paroxysmal atrial fibrillation (HCC)- (present on admission)   Assessment & Plan    On diltiazem and metoprolol  Monitor on telemetry  Pradaxa          Quality-Core Measures   Reviewed items::  Medications reviewed and Labs reviewed  Little catheter::  No Little  Antibiotics:  Treating active infection/contamination beyond 24 hours perioperative coverage

## 2018-09-18 NOTE — PROGRESS NOTES
Pulmonary Critical Care Progress Note        Chief Complaint: shortness of breath    Admission date: 9/14/2018    History of Present Illness: 68 y.o. female with a past medical history of alpha-1 antitrypsin deficiency on Prolastin intravenously q. weekly, COPD on home oxygen at 5 L/min nasal cannula, atrial fibrillation on Pradaxa who was evaluated at [facility] on September 14 complaining of shortness of breath and found to have evidence of pneumonia with fever, leukocytosis, abnormal chest x-ray findings.  She was started on antibiotics to cover for community acquired pneumonia.  Her blood cultures grew out staph epidermidis and the concern was that her port was infected and needed to be removed.  Removal was attempted by a surgeon at [facility] but unfortunately became sheared resulting in a retained catheter in the vessel.  She was transferred to [facility] for definitive care and seen by Dr. Vidal Gil who took her to the operating room today where she underwent successful retrieval of the catheter from the left subclavian/innominate vein but did require transection of the clavicle and repair of the subclavian vein followed by plating of the clavicle by orthopedics.  She was brought to the intensive care unit postprocedure for ongoing respiratory failure management and I was consulted by Dr. Ghotra.    ROS:  Respiratory: negative cough, negative pleuritic chest pain and positive shortness of breath, Cardiac: negative, negative chest pain and negative palpitations, GI: negative, negative nausea and negative abdominal pain.  All other systems reviewed per myself and negative.    Interval Events:  Continues on high flow nasal cannula, 60% high flow.  Increased needs overnight  Increased diuresis this morning  Pending echo transthoracic, patient not interested in CAMILLE currenlty unless absolutely necessary.    Likely needs camille to better assess valves with prior vegetation, would need to be  intubated.  Goal net neg 1-2 L next 24 hours  Continue to wean high flow for target sao2 > 89%  Up to chair if tolerates  Pending KRISSY, will follow up with cardiology, vegetation noted outside echocardiogram  Further reduced steroids  Leukocytosis downtrending, mg and p adequate  On vancomycin for strep viridans    PFSH:  No change.    Respiratory:     Pulse Oximetry: 98 %  Chest Tube Drains:          Exam: tachypnea and labored breathing  ImagingCXR  I have personally reviewed the chest x-ray my impression is   continued bilateral edema noted and CT chest Reviewed   Recent Labs      09/15/18   1705  09/15/18   1840  09/15/18   2040  09/16/18   1453   [id number]   --    --    --   7.47   [id number]   --    --    --   47.2*   [id number]   --    --    --   52.4*   [id number]   --    --    --   89.3*   ARTHCO3   --    --    --   34*   ARTBE   --    --    --   9*   ISTATAPH  7.283*  7.353*  7.403   --    ISTATAPCO2  81.9*  64.8*  53.7*   --    ISTATAPO2  72  67  53*   --    ISTATATCO2  41*  38*  35*   --    [id number]  91*  91*  86*   --    ISTATARTHCO3  38.7*  36.0*  33.5*   --    ISTATARTBE  9*  8*  7*   --    ISTATTEMP  see below  97.5 F  98.5 F   --    ISTATFIO2  50  50  40   --    ISTATSPEC  Arterial  Arterial  Arterial   --    ISTATAPHTC   --   7.362*  7.404   --    [id number]   --   64  52*   --        HemoDynamics:  Pulse: 73, Heart Rate (Monitored): 74  NIBP: 154/70       Exam: tachycardia  Imaging: Available data reviewed        Neuro:  GCS Total Pompeii Coma Score: 15       Exam: no focal deficits noted mental status intact  Imaging: Available data reviewed    Fluids:  Intake/Output       09/16/18 0700 - 09/17/18 0659 09/17/18 0700 - 09/18/18 0659 09/18/18 0700 - 09/19/18 0659      2621-7600 7710-7772 Total 8518-7284 8921-5338 Total 0700-1859 1900-0659 Total       Intake    P.O.  920  360 1280  --  485 485  --  -- --    P.O.  -- 485 485 -- -- --    I.V.  259  300 559  --  260 260  --  -- --    IV  Piggyback Volume (IV Piggyback) -- 300 300 -- 250 250 -- -- --    IV Volume (Normal saline) 259 -- 259 -- 10 10 -- -- --    Total Intake 7650 692 8512 -- 746 745 -- -- --       Output    Urine  775  800 1575  400  1000 1400  --  -- --    Number of Times Voided 3 x -- 3 x 1 x 1 x 2 x -- -- --    Urine Void (mL) (non-catheter)  400 1000 1400 -- -- --    Output (mL) ([REMOVED] Urinary Catheter Indwelling Catheter 16) 75 -- 75 -- -- -- -- -- --    Stool  --  -- --  --  -- --  --  -- --    Number of Times Stooled 1 x -- 1 x -- -- -- -- -- --    Total Output  400 1000 1400 -- -- --       Net I/O     404 -140 264 -400 -987 -031 -- -- --           Recent Labs      18   0525  18   2333  18   0540   SODIUM  136  138  135   POTASSIUM  4.0  3.8  4.0   CHLORIDE  98  95*  99   CO2  32  31  33   BUN  15  21  19   CREATININE  0.35*  0.60  0.41*   MAGNESIUM  2.4  2.2  2.3   PHOSPHORUS  2.3*  3.1  3.5   CALCIUM  8.5  8.7  8.7       GI/Nutrition:  Exam: abdomen is soft and non-tender, nontender, without masses or organomegaly  Imaging: Available data reviewed  NPO for procedure  Liver Function  Recent Labs      18   0525  18   2333  18   0540   ALTSGPT  17   --    --    ASTSGOT  10*   --    --    ALKPHOSPHAT  57   --    --    TBILIRUBIN  0.3   --    --    GLUCOSE  210*  278*  206*       Heme:  Recent Labs      18   0434  18   0525  18   0540   RBC  4.68  4.42  4.43   HEMOGLOBIN  12.7  11.5*  11.6*   HEMATOCRIT  40.4  37.9  37.8   PLATELETCT  268  267  271       Infectious Disease:  Temp  Av.8 °C (98.3 °F)  Min: 35.8 °C (96.4 °F)  Max: 37.3 °C (99.2 °F)  Micro: antibiotics reviewed and cultures reviewed  Recent Labs      18   0434  18   0525  18   0540   WBC  18.5*  22.6*  20.8*   NEUTSPOLYS  92.30*  90.50*  91.00*   LYMPHOCYTES  4.60*  4.40*  3.80*   MONOCYTES  2.20  3.90  4.10   EOSINOPHILS  0.00  0.00  0.00   BASOPHILS  0.10  0.30   0.20   ASTSGOT   --   10*   --    ALTSGPT   --   17   --    ALKPHOSPHAT   --   57   --    TBILIRUBIN   --   0.3   --      Current Facility-Administered Medications   Medication Dose Frequency Provider Last Rate Last Dose   • furosemide (LASIX) injection 40 mg  40 mg BID DIURETIC Veto Mcmillan M.D.       • furosemide (LASIX) injection 40 mg  40 mg Once Veto Mcmillan M.D.       • methylPREDNISolone (SOLU-MEDROL) 40 MG injection 40 mg  40 mg Q12HRS Veto Mcmillan M.D.       • metoprolol SR (TOPROL XL) tablet 100 mg  100 mg BID Veto Mcmillan M.D.   100 mg at 09/18/18 0551   • guaiFENesin LA (MUCINEX) tablet 1,200 mg  1,200 mg Q12HRS Veto Mcmillan M.D.   1,200 mg at 09/18/18 0550   • dabigatran (PRADAXA) capsule 75 mg  75 mg BID Bill James D.OCecilio   75 mg at 09/18/18 0551   • ipratropium-albuterol (DUONEB) nebulizer solution  3 mL Q4HRS (RT) Jeremy M Gonda, M.D.   3 mL at 09/18/18 0635   • DILTIAZem CD (CARDIZEM CD) capsule 360 mg  360 mg DAILY Jeremy M Gonda, M.D.   360 mg at 09/18/18 0551   • albuterol inhaler 2 Puff  2 Puff Q4HRS PRN Jeremy M Gonda, M.D.   2 Puff at 09/16/18 1252   • sodium bicarbonate 8.4 % injection 3 mL  3 mL Q4HRS (RT) Jeremy M Gonda, M.D.   3 mL at 09/18/18 0635   • acetaminophen (TYLENOL) tablet 650 mg  650 mg Q6HRS PRN Jeremy M Gonda, M.D.   650 mg at 09/16/18 1642   • MD Alert...Vancomycin per Pharmacy   pharmacy to dose Rahul Burger M.D.       • vancomycin 1,400 mg in  mL IVPB  20 mg/kg Q24HR Rahul Burger M.D.   Stopped at 09/18/18 0309   • ipratropium-albuterol (DUONEB) nebulizer solution  3 mL Q2HRS PRN (RT) Jeremy M Gonda, M.D.       • simvastatin (ZOCOR) tablet 10 mg  10 mg Q EVENING Vidal Ghotra M.D.   10 mg at 09/17/18 1656   • senna-docusate (PERICOLACE or SENOKOT S) 8.6-50 MG per tablet 2 Tab  2 Tab BID Rahul Burger M.D.   2 Tab at 09/18/18 0550    And   • polyethylene glycol/lytes (MIRALAX) PACKET 1 Packet  1 Packet QDAY PRN Rahul Burger M.D.         And   • magnesium hydroxide (MILK OF MAGNESIA) suspension 30 mL  30 mL QDAY PRN Rahul Burger M.D.   30 mL at 09/16/18 1349    And   • bisacodyl (DULCOLAX) suppository 10 mg  10 mg QDAY PRN Rahul Burger M.D.       • ondansetron (ZOFRAN) syringe/vial injection 4 mg  4 mg Q4HRS PRN Rahul Burger M.D.       • ondansetron (ZOFRAN ODT) dispertab 4 mg  4 mg Q4HRS PRN Vidal Ghotra M.D.       • Pharmacy Consult Request ...Pain Management Review   PRN Rahul Burger M.D.        And   • oxyCODONE immediate-release (ROXICODONE) tablet 5 mg  5 mg Q3HRS PRN Rahul Burger M.D.        And   • oxyCODONE immediate release (ROXICODONE) tablet 10 mg  10 mg Q3HRS PRN Rahul Burger M.D.   10 mg at 09/18/18 0339    And   • HYDROmorphone (DILAUDID) injection 0.5 mg  0.5 mg Q3HRS PRN Rahul Burger M.D.   0.5 mg at 09/16/18 1620   • tramadol (ULTRAM) 50 MG tablet 50 mg  50 mg Q4HRS PRN Rahul Burger M.D.   50 mg at 09/16/18 1836   • Respiratory Care per Protocol   Continuous RT Rahul Burger M.D.         Last reviewed on 9/15/2018  9:57 PM by Dee Bassett R.N.  9/20 chest xray reviewed, bilateral small effusions, no significant effusions, no consolidations  918 cta, no pe, bilateral small effusions, no significant effusions, + copd, + emphysema    Quality  Measures:  EKG reviewed, Radiology images reviewed, Labs reviewed and Medications reviewed  Little catheter: Critically Ill - Requiring Accurate Measurement of Urinary Output      DVT: pradaxa.  DVT prophylaxis - mechanical: SCDs  Ulcer prophylaxis: Not indicated  Antibiotics: Treating active infection/contamination beyond 24 hours perioperative coverage  Assessed for rehab: Patient was assess for and/or received rehabilitation services during this hospitalization    Assessment / Plan  Acute on chronic hypoxemic respiratory failure -intubated perioperatively 9/15, on high flow nasal cannula for hypoxia up to 60% flow.  Likely due to acute on  chronic exac copd/alpha antitrypsin def and hypervolemia.  Avoid sedatives.  Pending CAMILLE for vegetation, patient deferred, will do tte for now, likely will need camille for valvular assessemnt, would need to be intubated..  Duonebs q 4.  On spiriva but hold until can use adequately.  Pulmicort bid. Hold symbicort.  Incentive spirometry, out of bed.  Increased lasix dosing 1-2 l net negative, on 20 mg bid.  Reduce solumedrol to 40 mg bid     Alpha-1 antitrypsin deficiency/COPD on chronic 5 L/min nasal cannula 24 hours a day with acute exacerbation, prolastin pending, gets Thursday dosing weekly, missed last weeks dose     Valvular lesion, pending CAMILLE, continue current ab, staph epi bacteremia.  Deferred camille as above, pending tte.     Community acquired pneumonia, continue current antibiotics, staph epidermidis bacteremia     Retained port catheter status post retrieval/left clavicle transection/subclavian vein repair 9/15              - surgical site management, MILDRED drain              - analgesia     GERD: ppi     Atrial fibrillation, chronic anticoagulation, rate cont, on dilt and bb, holding xarelto post operatively.     Tobacco dependence - nicotine patch     Sepsis, resolved     Hyponatremia - improving            HLD - statin     Prophylaxis, diet        Discussed patient condition and risk of morbidity and/or mortality with Family, RN, RT, Pharmacy, Charge nurse / hot rounds and hospitalist.       The patient remains critically ill.  Critical care time = 46 minutes in directly providing and coordinating critical care and extensive data review.  No time overlap and excludes procedures.

## 2018-09-18 NOTE — CARE PLAN
Problem: Bronchopulmonary Hygiene:  Goal: Increase mobilization of retained secretions  Outcome: PROGRESSING AS EXPECTED  Aerobika PEP therapy QID does help patient cough up secretions    VEST QID

## 2018-09-18 NOTE — PROGRESS NOTES
"Pharmacy Kinetics 68 y.o. female on vancomycin day # 4 2018    Currently on Vancomycin 1400 mg IV q24h (0100)     Indication for Treatment: staph epi bacteremia / infected port     Pertinent history per medical record: Admitted on 2018 for port infection. Patient with implanted port for weekly Prolastin infusions for her alpha-1 antitrypsin deficiency. She is transferred from OSH where she presented with one day h/o SOB. She was admitted and treated for COPD/PNA. Her blood cx subsequently grew S epidermidis which was thought to be due to her port. Removal was felt to be too complicated and patient was transferred to [facility] for vascular consult.     Other antibiotics: none     Allergies: Patient has no known allergies.      List concerns for renal function: age, malnutrition/low albumin, fluid overload/lasix therapy     Pertinent cultures to date:   9/15/18: Blood, peripheral x2 = NGTD  9/15/18: subclavian cath tip = NGTD    Cultures from OSH:  18 Line: S epidermidis   18 blood-peripheral x 2:  S epidermidis x 1 of 2    Recent Labs      18   0434  18   0525  18   0540   WBC  18.5*  22.6*  20.8*   NEUTSPOLYS  92.30*  90.50*  91.00*     Recent Labs      18   0434  18   0525  18   2333  18   0540   BUN  12  15  21  19   CREATININE  0.38*  0.35*  0.60  0.41*   ALBUMIN   --   3.1*   --    --      Recent Labs      18   2333   [facility]  10.6     Intake/Output Summary (Last 24 hours) at 18 1549  Last data filed at 18 1000   Gross per 24 hour   Intake              745 ml   Output             2400 ml   Net            -1655 ml      Blood pressure 119/56, pulse 75, temperature 36.8 °C (98.3 °F), resp. rate (!) 33, height 1.6 m (5' 2.99\"), weight 74 kg (163 lb 2.3 oz), SpO2 91 %, not currently breastfeeding. Temp (24hrs), Av °C (98.6 °F), Min:36.6 °C (97.8 °F), Max:37.3 °C (99.2 °F)      A/P   1. Vancomycin dose change: continue current " regimen  2. Next vancomycin level: 3-4 days  3. Goal trough: 12-16 mcg/mL  4. Assessment: There were concerns that the outside facility echo showed a vegetation.  A repeat Echo has been ordered.  Follow up level last night is near the therapeutic range.  Expect patient to accumulate further into the therapeutic range.    5. Plan:  Continue current regimen with a recommended follow up level in 3-4 days.    Lora Monique, Pharm.D., BCPS

## 2018-09-18 NOTE — CARE PLAN
Problem: Oxygenation:  Goal: Maintain adequate oxygenation dependent on patient condition  Outcome: PROGRESSING AS EXPECTED  Respiratory Therapy Update      DUONEB Q4  Sodium Bicarb 3ml Q4  Symbicort BID  HHFNC 100% FiO2---45-60L  CPT w VEST QID  FLUTTER Valve/IS 1250ml/1500  Airway clearance ISSUE  Interdisciplinary Plan of Care-Goals (Indications)  Bronchodilator Indications: History / Diagnosis (09/17/18 1456)  Bronchopulmonary Hygiene Indications: Difficulty with Secretion Clearance (09/17/18 1456)  Hyperinflation Protocol Indications: Surgery Patient with COPD (09/17/18 1456)  Interdisciplinary Plan of Care-Outcomes   Bronchodilator Outcome: Diminished Wheezing and Volume of Air Movement Increased (09/17/18 1456)  Bronchopulmonary Hygiene Outcome: Optimal Hydration with Moderate or Less Sputum Production (09/17/18 1456)  Hyperinflation Protocol Goals/Outcome: Stable Vital Capacity x24 hrs and Patient Understands / uses I.S. (09/17/18 1456)    #PEP/CPT (Manual) Initial: Initial (09/16/18 1048)     #SVN Performed: Yes (09/17/18 1456)    Cough: Congested;Moist;Non Productive (09/17/18 1456)  Sputum Amount: Unable to Evaluate (09/17/18 1456)  Sputum Color: Unable to Evaluate (minimal secretions per patient) (09/17/18 0400)  Sputum Consistency: Unable to Evaluate (09/17/18 0227)    Heated Hi Flow Nasal Cannula (HHFNC): Yes (09/17/18 1456)  FiO2 (HHFNC): 100 (09/17/18 1456)  Flowrate (HHFNC): 60 (09/17/18 1456)    FiO2%: 100 % (09/17/18 1800)  O2 (LPM): 60 (09/17/18 1800)  O2 Daily Delivery Respiratory : Highflow Nasal Cannula (09/17/18 1456)    Breath Sounds  Pre/Post Intervention: Pre Intervention Assessment (09/17/18 1456)  RUL Breath Sounds: Clear (09/17/18 1600)  RML Breath Sounds: Clear (09/17/18 1600)  RLL Breath Sounds: Clear (09/17/18 1600)  OPAL Breath Sounds: Clear (09/17/18 1600)  LLL Breath Sounds: Diminished (09/17/18 1600)    Therapy changed to  Events/Summary/Plan: CPT/HHFC/IS (09/17/18 4781)

## 2018-09-19 ENCOUNTER — APPOINTMENT (OUTPATIENT)
Dept: CARDIOLOGY | Facility: MEDICAL CENTER | Age: 68
DRG: 270 | End: 2018-09-19
Attending: INTERNAL MEDICINE
Payer: MEDICARE

## 2018-09-19 LAB
ANION GAP SERPL CALC-SCNC: 8 MMOL/L (ref 0–11.9)
BASOPHILS # BLD AUTO: 0.2 % (ref 0–1.8)
BASOPHILS # BLD: 0.03 K/UL (ref 0–0.12)
BUN SERPL-MCNC: 15 MG/DL (ref 8–22)
CALCIUM SERPL-MCNC: 8.6 MG/DL (ref 8.5–10.5)
CHLORIDE SERPL-SCNC: 95 MMOL/L (ref 96–112)
CO2 SERPL-SCNC: 34 MMOL/L (ref 20–33)
CREAT SERPL-MCNC: 0.42 MG/DL (ref 0.5–1.4)
EOSINOPHIL # BLD AUTO: 0 K/UL (ref 0–0.51)
EOSINOPHIL NFR BLD: 0 % (ref 0–6.9)
ERYTHROCYTE [DISTWIDTH] IN BLOOD BY AUTOMATED COUNT: 45.8 FL (ref 35.9–50)
GLUCOSE BLD-MCNC: 204 MG/DL (ref 65–99)
GLUCOSE BLD-MCNC: 356 MG/DL (ref 65–99)
GLUCOSE SERPL-MCNC: 199 MG/DL (ref 65–99)
HCT VFR BLD AUTO: 39.1 % (ref 37–47)
HGB BLD-MCNC: 12.6 G/DL (ref 12–16)
IMM GRANULOCYTES # BLD AUTO: 0.31 K/UL (ref 0–0.11)
IMM GRANULOCYTES NFR BLD AUTO: 1.6 % (ref 0–0.9)
LV EJECT FRACT  99904: 65
LV EJECT FRACT MOD 2C 99903: 62
LV EJECT FRACT MOD 4C 99902: 59.48
LV EJECT FRACT MOD BP 99901: 61.58
LYMPHOCYTES # BLD AUTO: 0.84 K/UL (ref 1–4.8)
LYMPHOCYTES NFR BLD: 4.3 % (ref 22–41)
MCH RBC QN AUTO: 27.2 PG (ref 27–33)
MCHC RBC AUTO-ENTMCNC: 32.2 G/DL (ref 33.6–35)
MCV RBC AUTO: 84.4 FL (ref 81.4–97.8)
MONOCYTES # BLD AUTO: 1.08 K/UL (ref 0–0.85)
MONOCYTES NFR BLD AUTO: 5.5 % (ref 0–13.4)
NEUTROPHILS # BLD AUTO: 17.24 K/UL (ref 2–7.15)
NEUTROPHILS NFR BLD: 88.4 % (ref 44–72)
NRBC # BLD AUTO: 0 K/UL
NRBC BLD-RTO: 0 /100 WBC
PLATELET # BLD AUTO: 279 K/UL (ref 164–446)
PMV BLD AUTO: 9.6 FL (ref 9–12.9)
POTASSIUM SERPL-SCNC: 3.6 MMOL/L (ref 3.6–5.5)
RBC # BLD AUTO: 4.63 M/UL (ref 4.2–5.4)
SODIUM SERPL-SCNC: 137 MMOL/L (ref 135–145)
WBC # BLD AUTO: 19.5 K/UL (ref 4.8–10.8)

## 2018-09-19 PROCEDURE — 71045 X-RAY EXAM CHEST 1 VIEW: CPT

## 2018-09-19 PROCEDURE — 93306 TTE W/DOPPLER COMPLETE: CPT

## 2018-09-19 PROCEDURE — 700101 HCHG RX REV CODE 250: Performed by: INTERNAL MEDICINE

## 2018-09-19 PROCEDURE — 700111 HCHG RX REV CODE 636 W/ 250 OVERRIDE (IP): Performed by: HOSPITALIST

## 2018-09-19 PROCEDURE — 99291 CRITICAL CARE FIRST HOUR: CPT | Performed by: INTERNAL MEDICINE

## 2018-09-19 PROCEDURE — A9270 NON-COVERED ITEM OR SERVICE: HCPCS | Performed by: INTERNAL MEDICINE

## 2018-09-19 PROCEDURE — 94668 MNPJ CHEST WALL SBSQ: CPT

## 2018-09-19 PROCEDURE — 82962 GLUCOSE BLOOD TEST: CPT | Mod: 91

## 2018-09-19 PROCEDURE — 700111 HCHG RX REV CODE 636 W/ 250 OVERRIDE (IP): Performed by: INTERNAL MEDICINE

## 2018-09-19 PROCEDURE — 99232 SBSQ HOSP IP/OBS MODERATE 35: CPT | Performed by: INTERNAL MEDICINE

## 2018-09-19 PROCEDURE — 93308 TTE F-UP OR LMTD: CPT | Mod: 26 | Performed by: INTERNAL MEDICINE

## 2018-09-19 PROCEDURE — 80048 BASIC METABOLIC PNL TOTAL CA: CPT

## 2018-09-19 PROCEDURE — 700102 HCHG RX REV CODE 250 W/ 637 OVERRIDE(OP): Performed by: SURGERY

## 2018-09-19 PROCEDURE — 700102 HCHG RX REV CODE 250 W/ 637 OVERRIDE(OP): Performed by: HOSPITALIST

## 2018-09-19 PROCEDURE — A9270 NON-COVERED ITEM OR SERVICE: HCPCS | Performed by: HOSPITALIST

## 2018-09-19 PROCEDURE — 94640 AIRWAY INHALATION TREATMENT: CPT

## 2018-09-19 PROCEDURE — 700102 HCHG RX REV CODE 250 W/ 637 OVERRIDE(OP): Performed by: INTERNAL MEDICINE

## 2018-09-19 PROCEDURE — 770022 HCHG ROOM/CARE - ICU (200)

## 2018-09-19 PROCEDURE — 99233 SBSQ HOSP IP/OBS HIGH 50: CPT | Performed by: HOSPITALIST

## 2018-09-19 PROCEDURE — 700105 HCHG RX REV CODE 258: Performed by: HOSPITALIST

## 2018-09-19 PROCEDURE — A9270 NON-COVERED ITEM OR SERVICE: HCPCS | Performed by: SURGERY

## 2018-09-19 PROCEDURE — 85025 COMPLETE CBC W/AUTO DIFF WBC: CPT

## 2018-09-19 RX ORDER — PREDNISONE 50 MG/1
50 TABLET ORAL DAILY
Status: COMPLETED | OUTPATIENT
Start: 2018-09-20 | End: 2018-09-21

## 2018-09-19 RX ORDER — DEXTROSE MONOHYDRATE 25 G/50ML
25 INJECTION, SOLUTION INTRAVENOUS
Status: DISCONTINUED | OUTPATIENT
Start: 2018-09-19 | End: 2018-09-22 | Stop reason: HOSPADM

## 2018-09-19 RX ORDER — INSULIN GLARGINE 100 [IU]/ML
5 INJECTION, SOLUTION SUBCUTANEOUS EVERY EVENING
Status: DISCONTINUED | OUTPATIENT
Start: 2018-09-19 | End: 2018-09-22 | Stop reason: HOSPADM

## 2018-09-19 RX ORDER — PREDNISONE 20 MG/1
20 TABLET ORAL DAILY
Status: DISCONTINUED | OUTPATIENT
Start: 2018-09-28 | End: 2018-09-22 | Stop reason: HOSPADM

## 2018-09-19 RX ORDER — PREDNISONE 10 MG/1
50 TABLET ORAL DAILY
Status: DISCONTINUED | OUTPATIENT
Start: 2018-09-19 | End: 2018-09-19

## 2018-09-19 RX ORDER — PREDNISONE 10 MG/1
30 TABLET ORAL DAILY
Status: DISCONTINUED | OUTPATIENT
Start: 2018-09-25 | End: 2018-09-22 | Stop reason: HOSPADM

## 2018-09-19 RX ORDER — FUROSEMIDE 10 MG/ML
40 INJECTION INTRAMUSCULAR; INTRAVENOUS
Status: DISCONTINUED | OUTPATIENT
Start: 2018-09-20 | End: 2018-09-20

## 2018-09-19 RX ORDER — PREDNISONE 10 MG/1
10 TABLET ORAL DAILY
Status: DISCONTINUED | OUTPATIENT
Start: 2018-10-01 | End: 2018-09-22 | Stop reason: HOSPADM

## 2018-09-19 RX ORDER — PREDNISONE 20 MG/1
40 TABLET ORAL DAILY
Status: DISCONTINUED | OUTPATIENT
Start: 2018-09-22 | End: 2018-09-22 | Stop reason: HOSPADM

## 2018-09-19 RX ORDER — POTASSIUM CHLORIDE 20 MEQ/1
40 TABLET, EXTENDED RELEASE ORAL ONCE
Status: COMPLETED | OUTPATIENT
Start: 2018-09-19 | End: 2018-09-19

## 2018-09-19 RX ADMIN — IPRATROPIUM BROMIDE AND ALBUTEROL SULFATE 3 ML: .5; 3 SOLUTION RESPIRATORY (INHALATION) at 15:08

## 2018-09-19 RX ADMIN — IPRATROPIUM BROMIDE AND ALBUTEROL SULFATE 3 ML: .5; 3 SOLUTION RESPIRATORY (INHALATION) at 16:31

## 2018-09-19 RX ADMIN — INSULIN LISPRO 2 UNITS: 100 INJECTION, SOLUTION INTRAVENOUS; SUBCUTANEOUS at 17:39

## 2018-09-19 RX ADMIN — POTASSIUM CHLORIDE 40 MEQ: 1500 TABLET, EXTENDED RELEASE ORAL at 11:59

## 2018-09-19 RX ADMIN — IPRATROPIUM BROMIDE AND ALBUTEROL SULFATE 3 ML: .5; 3 SOLUTION RESPIRATORY (INHALATION) at 18:50

## 2018-09-19 RX ADMIN — SODIUM BICARBONATE 3 ML: 84 INJECTION, SOLUTION INTRAVENOUS at 18:50

## 2018-09-19 RX ADMIN — IPRATROPIUM BROMIDE AND ALBUTEROL SULFATE 3 ML: .5; 3 SOLUTION RESPIRATORY (INHALATION) at 11:40

## 2018-09-19 RX ADMIN — GUAIFENESIN 1200 MG: 600 TABLET, EXTENDED RELEASE ORAL at 05:59

## 2018-09-19 RX ADMIN — SODIUM BICARBONATE 3 ML: 84 INJECTION, SOLUTION INTRAVENOUS at 15:08

## 2018-09-19 RX ADMIN — TRAMADOL HYDROCHLORIDE 50 MG: 50 TABLET, FILM COATED ORAL at 00:14

## 2018-09-19 RX ADMIN — STANDARDIZED SENNA CONCENTRATE AND DOCUSATE SODIUM 2 TABLET: 8.6; 5 TABLET ORAL at 05:59

## 2018-09-19 RX ADMIN — HYDROMORPHONE HYDROCHLORIDE 0.5 MG: 2 INJECTION INTRAMUSCULAR; INTRAVENOUS; SUBCUTANEOUS at 17:38

## 2018-09-19 RX ADMIN — VANCOMYCIN HYDROCHLORIDE 1400 MG: 100 INJECTION, POWDER, LYOPHILIZED, FOR SOLUTION INTRAVENOUS at 01:00

## 2018-09-19 RX ADMIN — METHYLPREDNISOLONE SODIUM SUCCINATE 40 MG: 40 INJECTION, POWDER, FOR SOLUTION INTRAMUSCULAR; INTRAVENOUS at 05:59

## 2018-09-19 RX ADMIN — OXYCODONE HYDROCHLORIDE 5 MG: 5 TABLET ORAL at 17:30

## 2018-09-19 RX ADMIN — IPRATROPIUM BROMIDE AND ALBUTEROL SULFATE 3 ML: .5; 3 SOLUTION RESPIRATORY (INHALATION) at 02:50

## 2018-09-19 RX ADMIN — IPRATROPIUM BROMIDE AND ALBUTEROL SULFATE 3 ML: .5; 3 SOLUTION RESPIRATORY (INHALATION) at 22:50

## 2018-09-19 RX ADMIN — INSULIN LISPRO 5 UNITS: 100 INJECTION, SOLUTION INTRAVENOUS; SUBCUTANEOUS at 12:20

## 2018-09-19 RX ADMIN — SODIUM BICARBONATE 3 ML: 84 INJECTION, SOLUTION INTRAVENOUS at 11:40

## 2018-09-19 RX ADMIN — DABIGATRAN ETEXILATE MESYLATE 150 MG: 150 CAPSULE ORAL at 05:59

## 2018-09-19 RX ADMIN — DABIGATRAN ETEXILATE MESYLATE 150 MG: 150 CAPSULE ORAL at 17:30

## 2018-09-19 RX ADMIN — HYDROMORPHONE HYDROCHLORIDE 0.5 MG: 2 INJECTION INTRAMUSCULAR; INTRAVENOUS; SUBCUTANEOUS at 07:49

## 2018-09-19 RX ADMIN — VANCOMYCIN HYDROCHLORIDE 1400 MG: 100 INJECTION, POWDER, LYOPHILIZED, FOR SOLUTION INTRAVENOUS at 23:49

## 2018-09-19 RX ADMIN — SODIUM BICARBONATE 3 ML: 84 INJECTION, SOLUTION INTRAVENOUS at 22:50

## 2018-09-19 RX ADMIN — METOPROLOL SUCCINATE 100 MG: 100 TABLET, FILM COATED, EXTENDED RELEASE ORAL at 05:59

## 2018-09-19 RX ADMIN — OXYCODONE HYDROCHLORIDE 5 MG: 5 TABLET ORAL at 06:04

## 2018-09-19 RX ADMIN — INSULIN GLARGINE 5 UNITS: 100 INJECTION, SOLUTION SUBCUTANEOUS at 17:39

## 2018-09-19 RX ADMIN — HYDROMORPHONE HYDROCHLORIDE 0.5 MG: 2 INJECTION INTRAMUSCULAR; INTRAVENOUS; SUBCUTANEOUS at 22:48

## 2018-09-19 RX ADMIN — STANDARDIZED SENNA CONCENTRATE AND DOCUSATE SODIUM 2 TABLET: 8.6; 5 TABLET ORAL at 17:30

## 2018-09-19 RX ADMIN — PREDNISONE 50 MG: 10 TABLET ORAL at 11:59

## 2018-09-19 RX ADMIN — INSULIN LISPRO 2 UNITS: 100 INJECTION, SOLUTION INTRAVENOUS; SUBCUTANEOUS at 23:49

## 2018-09-19 RX ADMIN — HYDROMORPHONE HYDROCHLORIDE 0.5 MG: 2 INJECTION INTRAMUSCULAR; INTRAVENOUS; SUBCUTANEOUS at 12:21

## 2018-09-19 RX ADMIN — IPRATROPIUM BROMIDE AND ALBUTEROL SULFATE 3 ML: .5; 3 SOLUTION RESPIRATORY (INHALATION) at 06:52

## 2018-09-19 RX ADMIN — SIMVASTATIN 10 MG: 10 TABLET, FILM COATED ORAL at 17:30

## 2018-09-19 RX ADMIN — FUROSEMIDE 40 MG: 10 INJECTION, SOLUTION INTRAMUSCULAR; INTRAVENOUS at 05:59

## 2018-09-19 RX ADMIN — METOPROLOL SUCCINATE 100 MG: 100 TABLET, FILM COATED, EXTENDED RELEASE ORAL at 17:31

## 2018-09-19 RX ADMIN — SODIUM BICARBONATE 3 ML: 84 INJECTION, SOLUTION INTRAVENOUS at 02:50

## 2018-09-19 RX ADMIN — GUAIFENESIN 1200 MG: 600 TABLET, EXTENDED RELEASE ORAL at 17:30

## 2018-09-19 RX ADMIN — DILTIAZEM HYDROCHLORIDE 360 MG: 360 CAPSULE, EXTENDED RELEASE ORAL at 05:59

## 2018-09-19 RX ADMIN — SODIUM BICARBONATE 3 ML: 84 INJECTION, SOLUTION INTRAVENOUS at 06:52

## 2018-09-19 ASSESSMENT — ENCOUNTER SYMPTOMS
HEADACHES: 0
HEMOPTYSIS: 0
PALPITATIONS: 0
NECK PAIN: 0
CHILLS: 0
BACK PAIN: 0
NAUSEA: 0
COUGH: 1
SHORTNESS OF BREATH: 1
WEIGHT LOSS: 0
VOMITING: 0
WHEEZING: 1
SPUTUM PRODUCTION: 1
FEVER: 0
ABDOMINAL PAIN: 0
SPUTUM PRODUCTION: 0
COUGH: 0
FOCAL WEAKNESS: 0
DIARRHEA: 0

## 2018-09-19 ASSESSMENT — PAIN SCALES - GENERAL
PAINLEVEL_OUTOF10: 8
PAINLEVEL_OUTOF10: 0
PAINLEVEL_OUTOF10: 8
PAINLEVEL_OUTOF10: 8
PAINLEVEL_OUTOF10: 0
PAINLEVEL_OUTOF10: 5
PAINLEVEL_OUTOF10: 0
PAINLEVEL_OUTOF10: 3
PAINLEVEL_OUTOF10: 7
PAINLEVEL_OUTOF10: 3
PAINLEVEL_OUTOF10: 6
PAINLEVEL_OUTOF10: 0
PAINLEVEL_OUTOF10: 8
PAINLEVEL_OUTOF10: 5

## 2018-09-19 NOTE — CARE PLAN
Problem: Knowledge Deficit  Goal: Knowledge of disease process/condition, treatment plan, diagnostic tests, and medications will improve    Intervention: Assess knowledge level of disease process/condition, treatment plan, diagnostic tests, and medications  Today's plan of care reviewed:  Wean off high flow cannula if possible,  Ambulate if successfully weaned off high flow O2.  Bedside echocardiogram      Problem: Pain Management  Goal: Pain level will decrease to patient's comfort goal    Intervention: Follow pain managment plan developed in collaboration with patient and Interdisciplinary Team  Assess and medicate as needed for complaints of acute pain

## 2018-09-19 NOTE — PROGRESS NOTES
[facility] Hospitalist Progress Note    Date of Service: 2018    Chief Complaint  68 y.o. female admitted 2018 with shortness of breath    Ms Craig has a history of history of alpha-1 antitrypsin deficiency and chronic respiratory failure due to COPD, she is on 5L of O2 at baseline.  She presented to [facility] with pneumonia and sepsis.  Blood cultures were positive for staph epi.  Port removal was attempted but was unsuccessful.  She was transferred to [facility] for infectious disease consultation and vascular surgery availability.  On 9/15/18, she went to the OR for removal of infected retained catheter in the left subclavian vein requiring clavicle osteotomy.  Post operatively her respiratory status has been slow to recover.    Interval Problem Update  Alert, oriented and appropriate  Remains on high flow O2, feels short of breath with exertion, breathing slighltly better than yesterday, no chest pain  Mild non-productive cough, Tmax 36.8  Tolerating antibiotics, no signs of rash, no nausea or vomiting  On scheduled lasix, 2.8L over last 24 hours    Consultants/Specialty  Critical Care, I discussed the patient's condition with Dr. Mcmillan today on ICU Rounds    Disposition  Patient is on high flow oxygen, keep in the ICU  Her goal is to be discharged to her home in [place], [place], she will require an extended course of antibiotics, I discussed with social work, PICC line when OK with ID      Review of Systems   Respiratory: Positive for cough, shortness of breath and wheezing. Negative for hemoptysis and sputum production.    Cardiovascular: Negative for chest pain and palpitations.   Gastrointestinal: Negative for abdominal pain, nausea and vomiting.   Musculoskeletal: Negative for back pain and neck pain.   Skin: Negative for itching and rash.      Physical Exam  Laboratory/Imaging   Hemodynamics  Temp (24hrs), Av.9 °C (98.5 °F), Min:36.8 °C (98.2 °F), Max:37.4 °C (99.4 °F)   Temperature:  36.8 °C (98.2 °F)  Pulse  Av.6  Min: 59  Max: 120 Heart Rate (Monitored): 86  NIBP: 150/65      Respiratory      Respiration: (!) 31, Pulse Oximetry: 94 %, O2 Daily Delivery Respiratory : Highflow Nasal Cannula     Given By:: Mouthpiece (and inline), #MDI/DPI Given: MDI/DPI x 1, PEP/CPT Method: Vest;Flutter Valve, Work Of Breathing / Effort: Mild  RUL Breath Sounds: Clear, RML Breath Sounds: Clear, RLL Breath Sounds: Diminished, OPAL Breath Sounds: Clear, LLL Breath Sounds: Diminished    Fluids    Intake/Output Summary (Last 24 hours) at 18 0925  Last data filed at 18 0800   Gross per 24 hour   Intake              870 ml   Output             2800 ml   Net            -1930 ml       Nutrition  Orders Placed This Encounter   Procedures   • Diet Order Cardiac     Standing Status:   Standing     Number of Occurrences:   1     Order Specific Question:   Diet:     Answer:   Cardiac [6]     Physical Exam   Constitutional: She is oriented to person, place, and time. She appears well-developed and well-nourished.   HENT:   Head: Normocephalic and atraumatic.   Eyes: Conjunctivae and EOM are normal. Right eye exhibits no discharge. Left eye exhibits no discharge.   Cardiovascular: Normal rate, regular rhythm and intact distal pulses.    No murmur heard.  2+ Radial Pulses  Brisk Capillary Refill   Pulmonary/Chest: Effort normal and breath sounds normal. No respiratory distress. She has no wheezes.   Incision at left clavicle, small amount of dried blood, well approximated and intact   Abdominal: Soft. Bowel sounds are normal. She exhibits no distension. There is no tenderness. There is no rebound.   Musculoskeletal: Normal range of motion. She exhibits no edema.   Neurological: She is alert and oriented to person, place, and time. No cranial nerve deficit.   Skin: Skin is warm and dry. She is not diaphoretic. No erythema.   Skin is warm and well perfused   Psychiatric: She has a normal mood and affect.        Recent Labs      09/17/18   0525  09/18/18   0540  09/19/18   0828   WBC  22.6*  20.8*  19.5*   RBC  4.42  4.43  4.63   HEMOGLOBIN  11.5*  11.6*  12.6   HEMATOCRIT  37.9  37.8  39.1   MCV  85.7  85.3  84.4   MCH  26.0*  26.2*  27.2   MCHC  30.3*  30.7*  32.2*   RDW  46.5  46.4  45.8   PLATELETCT  267  271  279   MPV  10.4  9.8  9.6     Recent Labs      09/17/18   2333  09/18/18   0540  09/19/18   0828   SODIUM  138  135  137   POTASSIUM  3.8  4.0  3.6   CHLORIDE  95*  99  95*   CO2  31  33  34*   GLUCOSE  278*  206*  199*   BUN  21  19  15   CREATININE  0.60  0.41*  0.42*   CALCIUM  8.7  8.7  8.6                      Assessment/Plan     * Infected venous access port- (present on admission)   Assessment & Plan    Now s/p removal of port  Staph epi bacteremia  Continue IV vancomycin        Sepsis due to CAP, CRBSI (HCC)- (present on admission)   Assessment & Plan    This is sepsis (without associated acute organ dysfunction).    Source is pneumonia, and infected venous port.  Vancomycin, dose of vancomycin will be titrated to serum concentration levels to ensure adequate levels and reduce risk of toxicity  Infectious disease consulting  TTE to evaluate for vegitation, patient refused KRISSY        Staphylococcus epidermidis bacteremia, likely due to catheter related bloodstream infection/infected MediPort- (present on admission)   Assessment & Plan    On IV vancomycin          Acute on chronic respiratory failure with hypoxia (HCC)- (present on admission)   Assessment & Plan    On high flow oxygen, continue ICU care  Continue steroids  Diuresis as tolerated  Slow to improve but a little better today        CAP (community acquired pneumonia)- (present on admission)   Assessment & Plan    Normal procalcitonin levels ×2        Alpha-1-antitrypsin deficiency (HCC)- (present on admission)   Assessment & Plan    Every Thursday she receives IV Prolastin  Will need a PICC line as her prior port has been removed        Acquired  circulating anticoagulants (HCC)- (present on admission)   Assessment & Plan    Back on Pradaxa        GERD (gastroesophageal reflux disease)- (present on admission)   Assessment & Plan    Pepcid        Tobacco dependence- (present on admission)   Assessment & Plan    Ongoing encouragement to stop smoking        COPD (chronic obstructive pulmonary disease) due to alpha-1 antitrypsin deficiency (HCC)- (present on admission)   Assessment & Plan    Patient continues to require high flow oxygen  Continue Solu-Medrol        Paroxysmal atrial fibrillation (HCC)- (present on admission)   Assessment & Plan    On diltiazem and metoprolol  Monitor on telemetry  Pradaxa          Quality-Core Measures   Reviewed items::  Medications reviewed and Labs reviewed  Little catheter::  No Little  Antibiotics:  Treating active infection/contamination beyond 24 hours perioperative coverage

## 2018-09-19 NOTE — PROGRESS NOTES
Late entry:   Transported patient to CT with transport on monitor.  Patient tolerated trip and CT well.  IV contrast consent signed.

## 2018-09-19 NOTE — PROGRESS NOTES
Infectious Disease Progress Note    Author: Cari Sandhu M.D. Date & Time of service: 2018  12:03 PM    Chief Complaint:  Follow-up of staph epidermidis bacteremia    Interval History:   afebrile, white count down some from 19.5 K to 18.5 K.  Repeat blood cultures are no growth to date.  Extubated this morning, patient feels okay, minimal pain breathing fine.  -remains afebrile.  Still on high flow oxygen.  Says she feels better but still cannot catch her breath.  WBC count is 22.6 creatinine 0.35 denies any pain at the site of port  2018 T-max 99.2 continues to have greenish phlegm.  Still on high flow oxygen.  Is on 5 L at home.  Overall feels tired but improved.  Denies any discharge from the port removal site  2018 T-max 99.4 creatinine 0.42 WBC 19.5 still on high flow oxygen but down to 40% from 100%. Feels better overall      Labs Reviewed, Medications Reviewed and Radiology Reviewed.    Review of Systems:  Review of Systems   Constitutional: Negative for chills, fever and weight loss.   Respiratory: Positive for sputum production and shortness of breath. Negative for cough.    Cardiovascular: Negative for chest pain.   Gastrointestinal: Negative for abdominal pain, diarrhea, nausea and vomiting.   Genitourinary: Negative for dysuria.   Musculoskeletal: Negative for joint pain.   Skin: Negative for rash.   Neurological: Negative for focal weakness and headaches.   All other systems reviewed and are negative.      Hemodynamics:  Temp (24hrs), Av.9 °C (98.5 °F), Min:36.8 °C (98.2 °F), Max:37.4 °C (99.4 °F)  Temperature: 36.8 °C (98.2 °F)  Pulse  Av.3  Min: 59  Max: 120Heart Rate (Monitored): 64  NIBP: 125/57       Physical Exam:  Physical Exam   Constitutional: She is oriented to person, place, and time. She appears well-developed and well-nourished. No distress.   HENT:   Head: Normocephalic and atraumatic.   Nose: Nose normal.   Mouth/Throat: No oropharyngeal exudate.    Cushingoid facies   Eyes: Pupils are equal, round, and reactive to light. Conjunctivae and EOM are normal. Right eye exhibits no discharge. Left eye exhibits no discharge. No scleral icterus.   Neck: Neck supple. No JVD present.   Cardiovascular: Normal rate, regular rhythm, normal heart sounds and intact distal pulses.  Exam reveals no gallop and no friction rub.    No murmur heard.  Pulmonary/Chest: Effort normal. No respiratory distress. She has no wheezes. She has rales.   The site of previous port on the left chest is clean.  Steri-Strips present   Abdominal: Soft. Bowel sounds are normal. She exhibits no distension. There is no tenderness. There is no rebound.   Musculoskeletal: Normal range of motion. She exhibits no edema.   Lymphadenopathy:     She has no cervical adenopathy.   Neurological: She is alert and oriented to person, place, and time.   No gross cranial nerve deficit, no FND   Skin: Skin is warm and dry.   Psychiatric: She has a normal mood and affect. Her behavior is normal.   Vitals reviewed.      Meds:    Current Facility-Administered Medications:   •  insulin glargine **AND** insulin lispro **AND** Accu-Chek Q6 if NPO **AND** NOTIFY MD and PharmD **AND** glucose 4 g **AND** dextrose 50%  •  predniSONE  •  furosemide  •  tramadol  •  Notify provider if pain remains uncontrolled **AND** Use the numeric rating scale (NRS-11) on regular floors and Critical-Care Pain Observation Tool (CPOT) on ICUs/Trauma to assess pain **AND** Pulse Ox (Oximetry) **AND** Pharmacy Consult Request **AND** If patient difficult to arouse and/or has respiratory depression, stop any opiates that are currently infusing and call a Rapid Response. **AND** oxyCODONE immediate-release **AND** [DISCONTINUED] oxyCODONE immediate-release **AND** HYDROmorphone  •  dabigatran  •  metoprolol SR  •  guaiFENesin LA  •  ipratropium-albuterol  •  DILTIAZem CD  •  albuterol  •  sodium bicarbonate  •  acetaminophen  •  MD  Alert...Vancomycin per Pharmacy  •  vancomycin  •  ipratropium-albuterol  •  simvastatin  •  senna-docusate **AND** polyethylene glycol/lytes **AND** magnesium hydroxide **AND** bisacodyl  •  ondansetron  •  ondansetron  •  Respiratory Care per Protocol    Labs:  Recent Labs      09/17/18   0525  09/18/18   0540  09/19/18   0828   WBC  22.6*  20.8*  19.5*   RBC  4.42  4.43  4.63   HEMOGLOBIN  11.5*  11.6*  12.6   HEMATOCRIT  37.9  37.8  39.1   MCV  85.7  85.3  84.4   MCH  26.0*  26.2*  27.2   RDW  46.5  46.4  45.8   PLATELETCT  267  271  279   MPV  10.4  9.8  9.6   NEUTSPOLYS  90.50*  91.00*  88.40*   LYMPHOCYTES  4.40*  3.80*  4.30*   MONOCYTES  3.90  4.10  5.50   EOSINOPHILS  0.00  0.00  0.00   BASOPHILS  0.30  0.20  0.20     Recent Labs      09/17/18   2333  09/18/18   0540  09/19/18   0828   SODIUM  138  135  137   POTASSIUM  3.8  4.0  3.6   CHLORIDE  95*  99  95*   CO2  31  33  34*   GLUCOSE  278*  206*  199*   BUN  21  19  15     Recent Labs      09/17/18   0525  09/17/18   2333  09/18/18   0540  09/19/18   0828   ALBUMIN  3.1*   --    --    --    TBILIRUBIN  0.3   --    --    --    ALKPHOSPHAT  57   --    --    --    TOTPROTEIN  5.4*   --    --    --    ALTSGPT  17   --    --    --    ASTSGOT  10*   --    --    --    CREATININE  0.35*  0.60  0.41*  0.42*       Imaging:  Dx-chest-portable (1 View)    Result Date: 9/16/2018 9/16/2018 3:48 AM HISTORY/REASON FOR EXAM:  For indication of respiratory failure TECHNIQUE/EXAM DESCRIPTION AND NUMBER OF VIEWS: Single portable view of the chest. COMPARISON: Yesterday FINDINGS: The cardiomediastinal silhouettes are unchanged. Scattered bilateral interstitial opacities persist, in addition to left basilar atelectasis. Minimal atelectasis is also now developed in the right lung base. There is a small left pleural effusion. No pneumothorax. Endotracheal tube is no longer present.     New right basilar atelectasis. No significant change otherwise.    Dx-chest-portable (1  View)    Result Date: 9/15/2018  9/15/2018 4:46 PM HISTORY/REASON FOR EXAM:  POST INTUBATION. TECHNIQUE/EXAM DESCRIPTION AND NUMBER OF VIEWS: Single portable view of the chest. COMPARISON: 9/15/2018 FINDINGS: Cardiac mediastinal contour is unchanged. Lungs show hypoinflation. Increased opacity LEFT lung base with blunting of the costophrenic angle. No pneumothorax. Pulmonary vasculature is prominent. Endotracheal tube in place with tip approximately 5 cm above the roxana. Interval removal of LEFT subclavian catheter. Postoperative change of medial LEFT clavicle. Multiple surgical clips project over the LEFT upper chest.     1.  Supportive tubing as described above. 2.  Hypoinflation with worsening LEFT basilar atelectasis and pulmonary vascular congestion. 3.  Minimal LEFT pleural effusion again seen. 4.  No pneumothorax.    Dx-chest-portable (1 View)    Result Date: 9/15/2018  9/15/2018 10:35 AM HISTORY/REASON FOR EXAM:  Shortness of Breath. TECHNIQUE/EXAM DESCRIPTION AND NUMBER OF VIEWS: Single portable view of the chest. COMPARISON: None FINDINGS: Heart size is within normal limits.  There is left subclavian central line present with tip in expected location of the superior vena cava. There is mild diffuse interstitial prominence/vascular congestion. No pulmonary infiltrates or consolidations are noted. There are probable trace effusions. There is probable underlying COPD.     Mild diffuse interstitial prominence/vascular congestion with probable trace effusions.    Dx-clavicle Left    Result Date: 9/15/2018  9/15/2018 2:30 PM HISTORY/REASON FOR EXAM: . LEFT clavicle transection. TECHNIQUE/EXAM DESCRIPTION AND NUMBER OF VIEWS:  2 fluoroscopic views of the LEFT clavicle. COMPARISON: Chest x-ray 9/15/2018 FINDINGS: Images show internal fixation of medial LEFT clavicle with plate and multiple screws. 3 Seconds fluoroscopy time utilized.     Limited intraoperative images showing internal fixation of medial LEFT  clavicle.      Micro:  Results     Procedure Component Value Units Date/Time    GRAM STAIN [[id number]] Collected:  09/18/18 1033    Order Status:  Completed Specimen:  Respirate Updated:  09/18/18 1801     Significant Indicator .     Source RESP     Site SPUTUM     Gram Stain Result Few WBCs.  Few mixed bacteria, no predominant organism seen.  Specimen Quality Score: 1+      Narrative:       [facility]    CULTURE RESPIRATORY W/ GRM STN [[id number]] Collected:  09/18/18 1033    Order Status:  Completed Specimen:  Respirate from Sputum Updated:  09/18/18 1225    Narrative:       [facility]    CULTURE RESPIRATORY W/ GRM STN [[id number]]     Order Status:  Completed Specimen:  Respirate from Sputum     ANAEROBIC CULTURE [[id number]] Collected:  09/15/18 1453    Order Status:  Completed Specimen:  Wound Updated:  09/18/18 0818     Significant Indicator NEG     Source WND     Site Subclavian Catheter     Anaerobic Culture, Culture Res Culture in progress.    FUNGAL CULTURE [[id number]] Collected:  09/15/18 1453    Order Status:  Completed Specimen:  Wound Updated:  09/18/18 0818     Significant Indicator NEG     Source WND     Site Subclavian Catheter     Fungal Culture Culture in progress.    AFB CULTURE [[id number]] Collected:  09/15/18 1453    Order Status:  Completed Specimen:  Wound Updated:  09/18/18 0818     Significant Indicator NEG     Source WND     Site Subclavian Catheter     AFB Culture Culture in progress.     AFB Smear Results No acid fast bacilli seen.    CULTURE WOUND W/ GRAM STAIN [[id number]] Collected:  09/15/18 1453    Order Status:  Completed Specimen:  Wound Updated:  09/18/18 0818     Gram Stain Result No organisms seen.     Significant Indicator NEG     Source WND     Site Subclavian Catheter     Culture Result Wound No growth at 72 hours.    GRAM STAIN [[id number]] Collected:  09/15/18 1453    Order Status:  Completed Specimen:  Wound Updated:   "09/16/18 1921     Significant Indicator .     Source WND     Site Subclavian Catheter     Gram Stain Result No organisms seen.    ACID FAST STAIN [[id number]] Collected:  09/15/18 1453    Order Status:  Completed Specimen:  Wound Updated:  09/16/18 1921     Significant Indicator NEG     Source WND     Site Subclavian Catheter     AFB Smear Results No acid fast bacilli seen.    Blood Culture [[id number]]     Order Status:  Canceled Specimen:  Blood from Peripheral     Blood Culture [[id number]]     Order Status:  Canceled Specimen:  Blood from Peripheral     BLOOD CULTURE [[id number]] Collected:  09/15/18 0936    Order Status:  Completed Specimen:  Blood from Peripheral Updated:  09/16/18 1017     Significant Indicator NEG     Source BLD     Site PERIPHERAL     Blood Culture No Growth    Note: Blood cultures are incubated for 5 days and  are monitored continuously.Positive blood cultures  are called to the RN and reported as soon as  they are identified.      Narrative:       Per Hospital Policy: Only change Specimen Src: to \"Line\" if  specified by physician order.    BLOOD CULTURE [[id number]] Collected:  09/15/18 0937    Order Status:  Completed Specimen:  Blood from Peripheral Updated:  09/16/18 1017     Significant Indicator NEG     Source BLD     Site PERIPHERAL     Blood Culture No Growth    Note: Blood cultures are incubated for 5 days and  are monitored continuously.Positive blood cultures  are called to the RN and reported as soon as  they are identified.      Narrative:       Per Hospital Policy: Only change Specimen Src: to \"Line\" if  specified by physician order.    Blood Culture [[id number]]     Order Status:  Canceled Specimen:  Blood from Peripheral     Blood Culture [[id number]]     Order Status:  Canceled Specimen:  Blood from Peripheral           Assessment:  Ms. Craig is a very pleasant 68-year-old white female who was originally admitted to the hospital on 09/14/2018 due to increasing   shortness of " breath x 1 day. She has a past medical history significant for alpha-1 antitrypsin deficiency on weekly Prolastin infusions, and COPD on 5 L nasal cannula at home, A. fib on Pradaxa. She was transferred from [facility] where she presented with fever, leukocytosis, and sepsis initially thought to be due to pneumonia.  She was started on antibiotics for CAP and treated for COPD exacerbation.  She subsequently grew Staph epidermidis from her blood cultures.  Patient had an indwelling PowerPort and removal was attempted at OSH, but complicated by retained catheter.  She was transferred to [facility], and underwent successful retrieval of the catheter from the left subclavian, but it required transection of the clavicle and repair of the subclavian vein followed by plating of the clavicle by orthopedics.    She is currently on vancomycin, tolerating well.  We will plan to treat with 10 days of IV antibiotics.    Active Hospital Problems    Diagnosis   • *Staphylococcus epidermidis bacteremia, likely due to catheter related bloodstream infection/infected MediPort [R78.81]   • Infected venous access port [T80.219A]   • Sepsis due to CAP, CRBSI ([facility]) [A41.9]   • CAP (community acquired pneumonia) [J18.9]   • Paroxysmal atrial fibrillation ([facility]) [I48.0]   • Alpha-1-antitrypsin deficiency ([facility]) [E88.01]   • COPD (chronic obstructive pulmonary disease) due to alpha-1 antitrypsin deficiency ([facility]) [J44.9]   • Chronic hypoxemic respiratory failure due to COPD ([facility]) [J96.11]   • Tobacco dependence [F17.200]   • GERD (gastroesophageal reflux disease) [K21.9]   • Acquired circulating anticoagulants ([facility]) [D68.318]   • Acute on chronic respiratory failure with hypoxia ([facility]) [J96.21]       Plan:  Sepsis, Staph epidermidis bacteremia, presumed due to infected port  -Chest x-ray here with no definitive evidence of pneumonia per my read. The plated left clavicle is seen  -Complicated removal attempt at OSH, retrieved successfully at  [facility] on 9/15 -required transection of the clavicle and repair of the subclavian vein followed by plating of the clavicle  -Continue IV vancomycin  -Blood cultures are negative from 9/15/2018  Check an echocardiogram-has been ordered    Alpha-1 antitrypsin deficiency  -On weekly Prolastin infusions        COPD on home oxygen, chronic hypoxemic respiratory failure  Still on high flow oxygen  Check sputum cultures    Leukocytosis-  More likely due to methylprednisolone  Continue to monitor    ID will follow.  Please call with questions    Discussed with the team

## 2018-09-19 NOTE — CARE PLAN
Problem: Oxygenation:  Goal: Maintain adequate oxygenation dependent on patient condition  Outcome: PROGRESSING AS EXPECTED  5LPM NC-Home regimen    Patient has HFNC in room in case she needs it tonight.    Problem: Bronchoconstriction:  Goal: Improve in air movement and diminished wheezing  Outcome: MET Date Met: 09/19/18  Duoneb Q4 per home regimen and Q2 PRN    Bicarb Q4      Problem: Bronchopulmonary Hygiene:  Goal: Increase mobilization of retained secretions  Outcome: PROGRESSING AS EXPECTED  Aerobika PEP Therapy QID.   Patient is able to perform this therapy well.

## 2018-09-19 NOTE — FLOWSHEET NOTE
09/19/18 1508   Events/Summary/Plan   Events/Summary/Plan Patient placed on 5LPM NC (home regimen) per Dr Mcmillan wean order.

## 2018-09-19 NOTE — PROGRESS NOTES
Pulmonary Critical Care Progress Note        Chief Complaint: shortness of breath    Admission date: 9/14/2018    History of Present Illness: 68 y.o. female with a past medical history of alpha-1 antitrypsin deficiency on Prolastin intravenously q. weekly, COPD on home oxygen at 5 L/min nasal cannula, atrial fibrillation on Pradaxa who was evaluated at [facility] on September 14 complaining of shortness of breath and found to have evidence of pneumonia with fever, leukocytosis, abnormal chest x-ray findings.  She was started on antibiotics to cover for community acquired pneumonia.  Her blood cultures grew out staph epidermidis and the concern was that her port was infected and needed to be removed.  Removal was attempted by a surgeon at [facility] but unfortunately became sheared resulting in a retained catheter in the vessel.  She was transferred to [facility] for definitive care and seen by Dr. Vidal Gil who took her to the operating room today where she underwent successful retrieval of the catheter from the left subclavian/innominate vein but did require transection of the clavicle and repair of the subclavian vein followed by plating of the clavicle by orthopedics.  She was brought to the intensive care unit postprocedure for ongoing respiratory failure management and I was consulted by Dr. Ghotra.    ROS:  Respiratory: negative pleuritic chest pain, positive shortness of breath, negative sputum production and negative wheezing, Cardiac: negative, GI: negative.  All other systems negative.    Interval Events:  Remains on high flow up to 50% o2 flow.  Ambulate, mobilize  Ct chest reviewed, no pe  ECHO tte pending  Continue vancomycin line infection, valve  Adequately diuresed  Continue pradaxa  3.6 K, 40 meq given  Received prolastin last Thursday, will bring in dose  Reduce steroids  Reduce lasix to daily    PFSH:  No change.    Respiratory:     Pulse Oximetry: 95 %  Chest Tube Drains:           Exam: tachypnea and labored breathing  ImagingCXR  I have personally reviewed the chest x-ray my impression is   continued bilateral edema noted and CT chest Reviewed   Recent Labs      09/16/18   1453   [id number]  7.47   [id number]  47.2*   [id number]  52.4*   [id number]  89.3*   ARTHCO3  34*   ARTBE  9*       HemoDynamics:  Pulse: 67, Heart Rate (Monitored): 78  NIBP: (!) 161/70       Exam: tachycardia  Imaging: Available data reviewed        Neuro:  GCS Total Shelly Coma Score: 15       Exam: no focal deficits noted mental status intact  Imaging: Available data reviewed    Fluids:  Intake/Output       09/17/18 0700 - 09/18/18 0659 09/18/18 0700 - 09/19/18 0659 09/19/18 0700 - 09/20/18 0659      [id number] 6439-9050 Total 7991-3405 4243-6842 Total 7516-5200 3170-0799 Total       Intake    P.O.  --  485 485  --  240 240  --  -- --    P.O. -- 485 485 -- 240 240 -- -- --    I.V.  --  260 260  --  -- --  --  -- --    IV Piggyback Volume (IV Piggyback) -- 250 250 -- -- -- -- -- --    IV Volume (Normal saline) -- 10 10 -- -- -- -- -- --    Other  --  -- --  --  60 60  --  -- --    Medications (PO/Enteral Liquids) -- -- -- -- 60 60 -- -- --    Total Intake -- 745 745 -- 300 300 -- -- --       Output    Urine  400  1000 1400  1600  850 2450  --  -- --    Number of Times Voided 1 x 1 x 2 x 4 x 1 x 5 x -- -- --    Urine Void (mL) (non-catheter) 400 1000 1400 5059 714 8493 -- -- --    Stool  --  -- --  --  -- --  --  -- --    Number of Times Stooled -- -- -- -- 0 x 0 x -- -- --    Total Output 400 1000 1400 7569 590 1332 -- -- --       Net I/O     -400 -255 -655 -1600 -550 -2150 -- -- --           Recent Labs      09/17/18   0525  09/17/18   2333  09/18/18   0540   SODIUM  136  138  135   POTASSIUM  4.0  3.8  4.0   CHLORIDE  98  95*  99   CO2  32  31  33   BUN  15  21  19   CREATININE  0.35*  0.60  0.41*   MAGNESIUM  2.4  2.2  2.3   PHOSPHORUS  2.3*  3.1  3.5   CALCIUM  8.5  8.7  8.7       GI/Nutrition:  Exam: abdomen is  soft and non-tender, nontender, without masses or organomegaly  Imaging: Available data reviewed  NPO for procedure  Liver Function  Recent Labs      18   0525  18   2333  18   0540   ALTSGPT  17   --    --    ASTSGOT  10*   --    --    ALKPHOSPHAT  57   --    --    TBILIRUBIN  0.3   --    --    GLUCOSE  210*  278*  206*       Heme:  Recent Labs      18   0525  18   0540   RBC  4.42  4.43   HEMOGLOBIN  11.5*  11.6*   HEMATOCRIT  37.9  37.8   PLATELETCT  267  271       Infectious Disease:  Temp  Av °C (98.6 °F)  Min: 36.8 °C (98.3 °F)  Max: 37.4 °C (99.4 °F)  Micro: antibiotics reviewed and cultures reviewed  Recent Labs      18   0525  18   0540   WBC  22.6*  20.8*   NEUTSPOLYS  90.50*  91.00*   LYMPHOCYTES  4.40*  3.80*   MONOCYTES  3.90  4.10   EOSINOPHILS  0.00  0.00   BASOPHILS  0.30  0.20   ASTSGOT  10*   --    ALTSGPT  17   --    ALKPHOSPHAT  57   --    TBILIRUBIN  0.3   --      Current Facility-Administered Medications   Medication Dose Frequency Provider Last Rate Last Dose   • furosemide (LASIX) injection 40 mg  40 mg BID DIURETIC Veto Mcmillan M.D.   40 mg at 18 0559   • methylPREDNISolone (SOLU-MEDROL) 40 MG injection 40 mg  40 mg Q12HRS Veto Mcmillan M.D.   40 mg at 18 0559   • tramadol (ULTRAM) 50 MG tablet 50 mg  50 mg Q4HRS PRN Veto Mcmillan M.D.   50 mg at 18 0014   • Pharmacy Consult Request ...Pain Management Review   PRN Veto Mcmillan M.D.        And   • oxyCODONE immediate-release (ROXICODONE) tablet 5 mg  5 mg Q3HRS PRN Veto Mcmillan M.D.   5 mg at 18 0604    And   • HYDROmorphone (DILAUDID) injection 0.5 mg  0.5 mg Q3HRS PRN Veto Mcmillan M.D.   0.5 mg at 18   • dabigatran (PRADAXA) capsule 150 mg  150 mg BID Delonte Alvarado M.D.   150 mg at 18 0559   • metoprolol SR (TOPROL XL) tablet 100 mg  100 mg BID Veto Mcmillan M.D.   100 mg at 18 0559   • guaiFENesin LA (MUCINEX) tablet 1,200 mg  1,200 mg  Q12HRS Veto Mcmillan M.D.   1,200 mg at 09/19/18 0559   • ipratropium-albuterol (DUONEB) nebulizer solution  3 mL Q4HRS (RT) Jeremy M Gonda, M.D.   3 mL at 09/19/18 0250   • DILTIAZem CD (CARDIZEM CD) capsule 360 mg  360 mg DAILY Jeremy M Gonda, M.D.   360 mg at 09/19/18 0559   • albuterol inhaler 2 Puff  2 Puff Q4HRS PRN Jeremy M Gonda, M.D.   2 Puff at 09/16/18 1252   • sodium bicarbonate 8.4 % injection 3 mL  3 mL Q4HRS (RT) Jeremy M Gonda, M.D.   3 mL at 09/19/18 0250   • acetaminophen (TYLENOL) tablet 650 mg  650 mg Q6HRS PRN Jeremy M Gonda, M.D.   650 mg at 09/18/18 1435   • MD Alert...Vancomycin per Pharmacy   pharmacy to dose Rahul Burger M.D.       • vancomycin 1,400 mg in  mL IVPB  20 mg/kg Q24HR Rahul Burger M.D.   Stopped at 09/19/18 0300   • ipratropium-albuterol (DUONEB) nebulizer solution  3 mL Q2HRS PRN (RT) Jeremy M Gonda, M.D.       • simvastatin (ZOCOR) tablet 10 mg  10 mg Q EVENING Vidal Ghotra M.D.   10 mg at 09/18/18 1739   • senna-docusate (PERICOLACE or SENOKOT S) 8.6-50 MG per tablet 2 Tab  2 Tab BID Rahul Burger M.D.   2 Tab at 09/19/18 0559    And   • polyethylene glycol/lytes (MIRALAX) PACKET 1 Packet  1 Packet QDAY PRN Rahul Burger M.D.        And   • magnesium hydroxide (MILK OF MAGNESIA) suspension 30 mL  30 mL QDAY PRN Rahul Burger M.D.   30 mL at 09/16/18 1349    And   • bisacodyl (DULCOLAX) suppository 10 mg  10 mg QDAY PRN Rahul Burger M.D.       • ondansetron (ZOFRAN) syringe/vial injection 4 mg  4 mg Q4HRS PRN Rahul Burger M.D.       • ondansetron (ZOFRAN ODT) dispertab 4 mg  4 mg Q4HRS PRN Vidal Ghotra M.D.       • Respiratory Care per Protocol   Continuous RT Rahul Burger M.D.         Last reviewed on 9/15/2018  9:57 PM by Dee Bassett R.N.    Quality  Measures:  EKG reviewed, Radiology images reviewed, Labs reviewed and Medications reviewed  Little catheter: Critically Ill - Requiring Accurate Measurement of Urinary  Output      DVT: pradaxa.  DVT prophylaxis - mechanical: SCDs  Ulcer prophylaxis: Not indicated  Antibiotics: Treating active infection/contamination beyond 24 hours perioperative coverage  Assessed for rehab: Patient was assess for and/or received rehabilitation services during this hospitalization    Assessment / Plan  Acute on chronic hypoxemic respiratory failure -intubated perioperatively 9/15, on high flow nasal cannula up to 50% o2 flow for hypoxia.  Likely due to acute on chronic exac copd/alpha antitrypsin def and hypervolemia.  Avoid sedatives.  Pending CAMILLE for vegetation, patient deferred, will do tte for now, likely will need camille for valvular assessemnt, would need to be intubated..  Duonebs q 4.  On spiriva but hold until can use adequately.  Pulmicort bid. Hold symbicort.  Incentive spirometry, out of bed.  Reduce lasix to daily.  CT chest no PE, marleen small effusions, no significant edema, correlates with chest xray , net negative goal -500 to 1 L .  Reduced steroids, likely exac of copd at this point as now adequately.  Wean from high flow.  TTE pending.  Wean steroids prednisone 3 days 50 mg, wean by 10 mg increments every three days to 10 mg daily and follow upwith her primary physician.  Should have pulm follow up outpt, ok with kenia.    Alpha-1 antitrypsin deficiency/COPD on chronic 5 L/min nasal cannula 24 hours a day with acute exacerbation, prolastin pending, gets Thursday dosing weekly, missed last weeks dose per notes but patient says that she took.  She has doses at home, waiting to have transported here.  Emphysema present on ct but mild to moderate, not severe    Valvular lesion, pending CAMILLE, continue current ab, staph epi bacteremia.  Deferred camille as above, pending tte.    Community acquired pneumonia, continue current antibiotics, staph epidermidis bacteremia    Retained port catheter status post retrieval/left clavicle transection/subclavian vein repair 9/15              - surgical site  management, MILDRED drain              - analgesia    GERD: ppi    Atrial fibrillation, chronic anticoagulation, rate cont, on dilt and bb, holding xarelto post operatively.    Tobacco dependence - nicotine patch    Sepsis, resolved    Hyponatremia - improving            HLD - statin    Prophylaxis, diet      Discussed patient condition and risk of morbidity and/or mortality with Family, RN, RT, Pharmacy, Charge nurse / hot rounds and hospitalist.      The patient remains critically ill.  Critical care time = 40 minutes in directly providing and coordinating critical care and extensive data review.  No time overlap and excludes procedures.    Continued hypoxia on high flow nasal cannula.  Adequate renal function.  On anticoagulation.  CTA to assess lung parenchyma and for any pulmonary embolism.

## 2018-09-20 ENCOUNTER — APPOINTMENT (OUTPATIENT)
Dept: RADIOLOGY | Facility: MEDICAL CENTER | Age: 68
DRG: 270 | End: 2018-09-20
Attending: INTERNAL MEDICINE
Payer: MEDICARE

## 2018-09-20 LAB
BACTERIA BLD CULT: NORMAL
BACTERIA BLD CULT: NORMAL
BACTERIA SPEC ANAEROBE CULT: NORMAL
BACTERIA SPEC RESP CULT: ABNORMAL
BACTERIA SPEC RESP CULT: ABNORMAL
GLUCOSE BLD-MCNC: 159 MG/DL (ref 65–99)
GLUCOSE BLD-MCNC: 180 MG/DL (ref 65–99)
GLUCOSE BLD-MCNC: 232 MG/DL (ref 65–99)
GLUCOSE BLD-MCNC: 239 MG/DL (ref 65–99)
GRAM STN SPEC: ABNORMAL
SIGNIFICANT IND 70042: ABNORMAL
SIGNIFICANT IND 70042: NORMAL
SITE SITE: ABNORMAL
SITE SITE: NORMAL
SOURCE SOURCE: ABNORMAL
SOURCE SOURCE: NORMAL

## 2018-09-20 PROCEDURE — 99233 SBSQ HOSP IP/OBS HIGH 50: CPT | Performed by: HOSPITALIST

## 2018-09-20 PROCEDURE — 700102 HCHG RX REV CODE 250 W/ 637 OVERRIDE(OP): Performed by: INTERNAL MEDICINE

## 2018-09-20 PROCEDURE — 700102 HCHG RX REV CODE 250 W/ 637 OVERRIDE(OP): Performed by: SURGERY

## 2018-09-20 PROCEDURE — A9270 NON-COVERED ITEM OR SERVICE: HCPCS | Performed by: HOSPITALIST

## 2018-09-20 PROCEDURE — 94668 MNPJ CHEST WALL SBSQ: CPT

## 2018-09-20 PROCEDURE — 99233 SBSQ HOSP IP/OBS HIGH 50: CPT | Performed by: INTERNAL MEDICINE

## 2018-09-20 PROCEDURE — 82962 GLUCOSE BLOOD TEST: CPT | Mod: 91

## 2018-09-20 PROCEDURE — 94669 MECHANICAL CHEST WALL OSCILL: CPT

## 2018-09-20 PROCEDURE — 700111 HCHG RX REV CODE 636 W/ 250 OVERRIDE (IP): Performed by: HOSPITALIST

## 2018-09-20 PROCEDURE — A9270 NON-COVERED ITEM OR SERVICE: HCPCS | Performed by: SURGERY

## 2018-09-20 PROCEDURE — 71045 X-RAY EXAM CHEST 1 VIEW: CPT

## 2018-09-20 PROCEDURE — A9270 NON-COVERED ITEM OR SERVICE: HCPCS | Performed by: INTERNAL MEDICINE

## 2018-09-20 PROCEDURE — 700102 HCHG RX REV CODE 250 W/ 637 OVERRIDE(OP): Performed by: HOSPITALIST

## 2018-09-20 PROCEDURE — 770001 HCHG ROOM/CARE - MED/SURG/GYN PRIV*

## 2018-09-20 PROCEDURE — 94640 AIRWAY INHALATION TREATMENT: CPT

## 2018-09-20 PROCEDURE — 700105 HCHG RX REV CODE 258: Performed by: HOSPITALIST

## 2018-09-20 PROCEDURE — 700111 HCHG RX REV CODE 636 W/ 250 OVERRIDE (IP): Performed by: INTERNAL MEDICINE

## 2018-09-20 PROCEDURE — 700101 HCHG RX REV CODE 250: Performed by: INTERNAL MEDICINE

## 2018-09-20 RX ORDER — LISINOPRIL 20 MG/1
20 TABLET ORAL
Status: DISCONTINUED | OUTPATIENT
Start: 2018-09-20 | End: 2018-09-22 | Stop reason: HOSPADM

## 2018-09-20 RX ORDER — FUROSEMIDE 40 MG/1
40 TABLET ORAL
Status: DISCONTINUED | OUTPATIENT
Start: 2018-09-20 | End: 2018-09-22 | Stop reason: HOSPADM

## 2018-09-20 RX ADMIN — OXYCODONE HYDROCHLORIDE 5 MG: 5 TABLET ORAL at 17:32

## 2018-09-20 RX ADMIN — IPRATROPIUM BROMIDE AND ALBUTEROL SULFATE 3 ML: .5; 3 SOLUTION RESPIRATORY (INHALATION) at 13:15

## 2018-09-20 RX ADMIN — IPRATROPIUM BROMIDE AND ALBUTEROL SULFATE 3 ML: .5; 3 SOLUTION RESPIRATORY (INHALATION) at 09:15

## 2018-09-20 RX ADMIN — INSULIN LISPRO 1 UNITS: 100 INJECTION, SOLUTION INTRAVENOUS; SUBCUTANEOUS at 17:36

## 2018-09-20 RX ADMIN — IPRATROPIUM BROMIDE AND ALBUTEROL SULFATE 3 ML: .5; 3 SOLUTION RESPIRATORY (INHALATION) at 22:59

## 2018-09-20 RX ADMIN — GUAIFENESIN 1200 MG: 600 TABLET, EXTENDED RELEASE ORAL at 05:55

## 2018-09-20 RX ADMIN — FUROSEMIDE 40 MG: 40 TABLET ORAL at 12:53

## 2018-09-20 RX ADMIN — INSULIN LISPRO 1 UNITS: 100 INJECTION, SOLUTION INTRAVENOUS; SUBCUTANEOUS at 06:23

## 2018-09-20 RX ADMIN — LISINOPRIL 20 MG: 20 TABLET ORAL at 12:53

## 2018-09-20 RX ADMIN — GUAIFENESIN 1200 MG: 600 TABLET, EXTENDED RELEASE ORAL at 17:31

## 2018-09-20 RX ADMIN — STANDARDIZED SENNA CONCENTRATE AND DOCUSATE SODIUM 2 TABLET: 8.6; 5 TABLET ORAL at 05:55

## 2018-09-20 RX ADMIN — INSULIN GLARGINE 5 UNITS: 100 INJECTION, SOLUTION SUBCUTANEOUS at 17:36

## 2018-09-20 RX ADMIN — METOPROLOL SUCCINATE 100 MG: 100 TABLET, FILM COATED, EXTENDED RELEASE ORAL at 17:31

## 2018-09-20 RX ADMIN — OXYCODONE HYDROCHLORIDE 5 MG: 5 TABLET ORAL at 13:08

## 2018-09-20 RX ADMIN — SIMVASTATIN 10 MG: 10 TABLET, FILM COATED ORAL at 17:31

## 2018-09-20 RX ADMIN — INSULIN LISPRO 2 UNITS: 100 INJECTION, SOLUTION INTRAVENOUS; SUBCUTANEOUS at 13:28

## 2018-09-20 RX ADMIN — VANCOMYCIN HYDROCHLORIDE 1400 MG: 100 INJECTION, POWDER, LYOPHILIZED, FOR SOLUTION INTRAVENOUS at 23:54

## 2018-09-20 RX ADMIN — DABIGATRAN ETEXILATE MESYLATE 150 MG: 150 CAPSULE ORAL at 05:55

## 2018-09-20 RX ADMIN — DILTIAZEM HYDROCHLORIDE 360 MG: 360 CAPSULE, EXTENDED RELEASE ORAL at 05:55

## 2018-09-20 RX ADMIN — IPRATROPIUM BROMIDE AND ALBUTEROL SULFATE 3 ML: .5; 3 SOLUTION RESPIRATORY (INHALATION) at 15:55

## 2018-09-20 RX ADMIN — STANDARDIZED SENNA CONCENTRATE AND DOCUSATE SODIUM 2 TABLET: 8.6; 5 TABLET ORAL at 18:00

## 2018-09-20 RX ADMIN — FUROSEMIDE 40 MG: 10 INJECTION, SOLUTION INTRAMUSCULAR; INTRAVENOUS at 05:56

## 2018-09-20 RX ADMIN — PREDNISONE 50 MG: 50 TABLET ORAL at 05:55

## 2018-09-20 RX ADMIN — INSULIN LISPRO 2 UNITS: 100 INJECTION, SOLUTION INTRAVENOUS; SUBCUTANEOUS at 23:52

## 2018-09-20 RX ADMIN — METOPROLOL SUCCINATE 100 MG: 100 TABLET, FILM COATED, EXTENDED RELEASE ORAL at 05:56

## 2018-09-20 RX ADMIN — IPRATROPIUM BROMIDE AND ALBUTEROL SULFATE 3 ML: .5; 3 SOLUTION RESPIRATORY (INHALATION) at 19:30

## 2018-09-20 ASSESSMENT — ENCOUNTER SYMPTOMS
ABDOMINAL PAIN: 0
NAUSEA: 0
MYALGIAS: 1
CHILLS: 0
DIARRHEA: 0
BACK PAIN: 1
COUGH: 1
SPUTUM PRODUCTION: 0
COUGH: 0
FOCAL WEAKNESS: 0
VOMITING: 0
WEIGHT LOSS: 0
FEVER: 0
SHORTNESS OF BREATH: 1
HEADACHES: 0
HEMOPTYSIS: 0
PALPITATIONS: 0
SPUTUM PRODUCTION: 1

## 2018-09-20 ASSESSMENT — PAIN SCALES - GENERAL
PAINLEVEL_OUTOF10: 0
PAINLEVEL_OUTOF10: 7
PAINLEVEL_OUTOF10: 0

## 2018-09-20 ASSESSMENT — PATIENT HEALTH QUESTIONNAIRE - PHQ9
SUM OF ALL RESPONSES TO PHQ9 QUESTIONS 1 AND 2: 0
2. FEELING DOWN, DEPRESSED, IRRITABLE, OR HOPELESS: NOT AT ALL
1. LITTLE INTEREST OR PLEASURE IN DOING THINGS: NOT AT ALL
1. LITTLE INTEREST OR PLEASURE IN DOING THINGS: NOT AT ALL
SUM OF ALL RESPONSES TO PHQ9 QUESTIONS 1 AND 2: 0
2. FEELING DOWN, DEPRESSED, IRRITABLE, OR HOPELESS: NOT AT ALL

## 2018-09-20 NOTE — PROGRESS NOTES
0700 Bedside report received, patient denies complaints. Up to bedside commode without issues.     0900 Tolerated breakfast well. Interdisciplinary rounds complete, patient to transfer to floor per  Order.     1300 Patient ambulatory in room then up to chair, tolerated well.     1630 Patient ambulatory in unit 100 feet with assistance of staff monitored on oxygen, tolerated well.     1900 Bedside report complete, patient tolerating po intake well.

## 2018-09-20 NOTE — PROGRESS NOTES
Pulmonary Critical Care Progress Note         Chief Complaint: shortness of breath     Admission date: 9/14/2018     History of Present Illness: 68 y.o. female with a past medical history of alpha-1 antitrypsin deficiency on Prolastin intravenously q. weekly, COPD on home oxygen at 5 L/min nasal cannula, atrial fibrillation on Pradaxa who was evaluated at [facility] on September 14 complaining of shortness of breath and found to have evidence of pneumonia with fever, leukocytosis, abnormal chest x-ray findings.  She was started on antibiotics to cover for community acquired pneumonia.  Her blood cultures grew out staph epidermidis and the concern was that her port was infected and needed to be removed.  Removal was attempted by a surgeon at [facility] but unfortunately became sheared resulting in a retained catheter in the vessel.  She was transferred to [facility] for definitive care and seen by Dr. Vidal Gil who took her to the operating room today where she underwent successful retrieval of the catheter from the left subclavian/innominate vein but did require transection of the clavicle and repair of the subclavian vein followed by plating of the clavicle by orthopedics.  She was brought to the intensive care unit postprocedure for ongoing respiratory failure management and I was consulted by Dr. Ghotra.     ROS:  Respiratory: negative cough, negative pleuritic chest pain and positive shortness of breath, Cardiac: negative, negative chest pain and negative palpitations, GI: negative, negative nausea and negative abdominal pain.  All other systems reviewed per myself and negative.     Interval Events:  Off of high flow nasal cannula, on 5 l nc o2  Changed lasix to 40 mg daily po  Sputum + pseudomonas, likely colonization.  As patient improving will hold on antibiotics.  If worsens then can start anti pseudomonal coverage  Keep sao2 > 88%  Pulmonary hypertension, moderate  TTE no  vegetation  On prednisone, extended taper  -500 ml past 24 hours, keep net negative -500 ml daily  Chest xray slightly improved congestion, no significant pleural effusions, no consolidations     PFSH:  No change.     General: nad, resting comfortably  Neuro/Psych: cn 2-12 intact, no focal motor or sensory changes, no vision changes, reflexes intact, normal mood and affect  HEENT: nc/at, no jvd, no lymphadenopathy, oral mucosa moist, eomi+, perrla, no tracheal deviation  CVS: s1, s2 heard, neg murmur noted, rrr  Respiratory: cta, marleen, improved wheezing from prior day, no crackles, adequate breath sounds  Abdomen/: soft, nt, neg hsm/m, no guarding, no rigidity  Extremities: trace edema, no erythema noted, adequate pulses ext and perfusion  Skin: no rashes or bruising noted,        Respiratory:  Pulse Oximetry: 98 %  Chest Tube Drains:     Exam: tachypnea and labored breathing  ImagingCXR  I have personally reviewed the chest x-ray my impression is   continued bilateral edema noted and CT chest Reviewed          Recent Labs      09/15/18   1705  09/15/18   1840  09/15/18   2040  09/16/18   1453   [id number]   --    --    --   7.47   [id number]   --    --    --   47.2*   [id number]   --    --    --   52.4*   [id number]   --    --    --   89.3*   ARTHCO3   --    --    --   34*   ARTBE   --    --    --   9*   ISTATAPH  7.283*  7.353*  7.403   --    ISTATAPCO2  81.9*  64.8*  53.7*   --    ISTATAPO2  72  67  53*   --    ISTATATCO2  41*  38*  35*   --    [id number]  91*  91*  86*   --    ISTATARTHCO3  38.7*  36.0*  33.5*   --    ISTATARTBE  9*  8*  7*   --    ISTATTEMP  see below  97.5 F  98.5 F   --    ISTATFIO2  50  50  40   --    ISTATSPEC  Arterial  Arterial  Arterial   --    ISTATAPHTC   --   7.362*  7.404   --    [id number]   --   64  52*   --          HemoDynamics:  Pulse: 73, Heart Rate (Monitored): 74  NIBP: 154/70        Exam: tachycardia  Imaging: Available data reviewed         Neuro:  GCS Total Brielle Coma  Score: 15     Exam: no focal deficits noted mental status intact  Imaging: Available data reviewed     Fluids:         Intake/Output        09/16/18 0700 - 09/17/18 0659 09/17/18 0700 - 09/18/18 0659 09/18/18 0700 - 09/19/18 0659       [id number] 7275-7971 Total 7129-3549 2757-7456 Total 9348-4322 2390-7945 Total                   Intake     P.O.  920  360 1280  --  485 485  --  -- --     P.O.  -- 485 485 -- -- --     I.V.  259  300 559  --  260 260  --  -- --     IV Piggyback Volume (IV Piggyback) -- 300 300 -- 250 250 -- -- --     IV Volume (Normal saline) 259 -- 259 -- 10 10 -- -- --     Total Intake 9219 400 9827 -- 745 745 -- -- --                   Output     Urine  775  800 1575  400  1000 1400  --  -- --     Number of Times Voided 3 x -- 3 x 1 x 1 x 2 x -- -- --     Urine Void (mL) (non-catheter)  400 1000 1400 -- -- --     Output (mL) ([REMOVED] Urinary Catheter Indwelling Catheter 16) 75 -- 75 -- -- -- -- -- --     Stool  --  -- --  --  -- --  --  -- --     Number of Times Stooled 1 x -- 1 x -- -- -- -- -- --     Total Output  400 1000 1400 -- -- --                   Net I/O       404 -140 264 -400 -683 -065 -- -- --          Recent Labs      09/17/18   0525  09/17/18   2333  09/18/18   0540   SODIUM  136  138  135   POTASSIUM  4.0  3.8  4.0   CHLORIDE  98  95*  99   CO2  32  31  33   BUN  15  21  19   CREATININE  0.35*  0.60  0.41*   MAGNESIUM  2.4  2.2  2.3   PHOSPHORUS  2.3*  3.1  3.5   CALCIUM  8.5  8.7  8.7         GI/Nutrition:  Exam: abdomen is soft and non-tender, nontender, without masses or organomegaly  Imaging: Available data reviewed  NPO for procedure  Liver Function        Recent Labs      09/17/18   0525  09/17/18   2333  09/18/18   0540   ALTSGPT  17   --    --    ASTSGOT  10*   --    --    ALKPHOSPHAT  57   --    --    TBILIRUBIN  0.3   --    --    GLUCOSE  210*  278*  206*         Heme:  Recent Labs      09/16/18   0434  09/17/18   0525  09/18/18   0540    RBC  4.68  4.42  4.43   HEMOGLOBIN  12.7  11.5*  11.6*   HEMATOCRIT  40.4  37.9  37.8   PLATELETCT  268  267  271         Infectious Disease:  Temp  Av.8 °C (98.3 °F)  Min: 35.8 °C (96.4 °F)  Max: 37.3 °C (99.2 °F)  Micro: antibiotics reviewed and cultures reviewed        Recent Labs      18   0434  18   0525  18   0540   WBC  18.5*  22.6*  20.8*   NEUTSPOLYS  92.30*  90.50*  91.00*   LYMPHOCYTES  4.60*  4.40*  3.80*   MONOCYTES  2.20  3.90  4.10   EOSINOPHILS  0.00  0.00  0.00   BASOPHILS  0.10  0.30  0.20   ASTSGOT   --   10*   --    ALTSGPT   --   17   --    ALKPHOSPHAT   --   57   --    TBILIRUBIN   --   0.3   --                Current Facility-Administered Medications   Medication Dose Frequency Provider Last Rate Last Dose   • furosemide (LASIX) injection 40 mg  40 mg BID DIURETIC Veto Mcmillan M.D.       • furosemide (LASIX) injection 40 mg  40 mg Once Veto Mcmillan M.D.       • methylPREDNISolone (SOLU-MEDROL) 40 MG injection 40 mg  40 mg Q12HRS Veto Mcmillan M.D.       • metoprolol SR (TOPROL XL) tablet 100 mg  100 mg BID Veto Mcmillan M.D.   100 mg at 18 0551   • guaiFENesin LA (MUCINEX) tablet 1,200 mg  1,200 mg Q12HRS Veto Mcmillan M.D.   1,200 mg at 18 0550   • dabigatran (PRADAXA) capsule 75 mg  75 mg BID Bill James D.O.   75 mg at 18 0551   • ipratropium-albuterol (DUONEB) nebulizer solution  3 mL Q4HRS (RT) Jeremy M Gonda, M.D.   3 mL at 18 0635   • DILTIAZem CD (CARDIZEM CD) capsule 360 mg  360 mg DAILY Jeremy M Gonda, M.D.   360 mg at 18 0551   • albuterol inhaler 2 Puff  2 Puff Q4HRS PRN Jeremy M Gonda, M.D.   2 Puff at 18 1252   • sodium bicarbonate 8.4 % injection 3 mL  3 mL Q4HRS (RT) Jeremy M Gonda, M.D.   3 mL at 18 0635   • acetaminophen (TYLENOL) tablet 650 mg  650 mg Q6HRS PRN Jeremy M Gonda, M.D.   650 mg at 18 1642   • MD Alert...Vancomycin per Pharmacy   pharmacy to dose Rahul Burger M.D.       •  vancomycin 1,400 mg in  mL IVPB  20 mg/kg Q24HR Rahul Burger M.D.   Stopped at 09/18/18 0309   • ipratropium-albuterol (DUONEB) nebulizer solution  3 mL Q2HRS PRN (RT) Jeremy M Gonda, M.D.       • simvastatin (ZOCOR) tablet 10 mg  10 mg Q EVENING Vidal Ghotra M.D.   10 mg at 09/17/18 1656   • senna-docusate (PERICOLACE or SENOKOT S) 8.6-50 MG per tablet 2 Tab  2 Tab BID Rahul Burger M.D.   2 Tab at 09/18/18 0550     And   • polyethylene glycol/lytes (MIRALAX) PACKET 1 Packet  1 Packet QDAY PRN Rahul Burger M.D.         And   • magnesium hydroxide (MILK OF MAGNESIA) suspension 30 mL  30 mL QDAY PRN Rahul Burger M.D.   30 mL at 09/16/18 1349     And   • bisacodyl (DULCOLAX) suppository 10 mg  10 mg QDAY PRN Rahul Burger M.D.       • ondansetron (ZOFRAN) syringe/vial injection 4 mg  4 mg Q4HRS PRN Rahul Burger M.D.       • ondansetron (ZOFRAN ODT) dispertab 4 mg  4 mg Q4HRS PRN Vidal Ghotra M.D.       • Pharmacy Consult Request ...Pain Management Review   PRN Rahul Burger M.D.         And   • oxyCODONE immediate-release (ROXICODONE) tablet 5 mg  5 mg Q3HRS PRN Rahul Burger M.D.         And   • oxyCODONE immediate release (ROXICODONE) tablet 10 mg  10 mg Q3HRS PRN Rahul Burger M.D.   10 mg at 09/18/18 0339     And   • HYDROmorphone (DILAUDID) injection 0.5 mg  0.5 mg Q3HRS PRN Rahul Burger M.D.   0.5 mg at 09/16/18 1620   • tramadol (ULTRAM) 50 MG tablet 50 mg  50 mg Q4HRS PRN Rahul Burger M.D.   50 mg at 09/16/18 1836   • Respiratory Care per Protocol   Continuous RT Rahul Burger M.D.          Last reviewed on 9/15/2018  9:57 PM by Dee Bassett R.N.  9/20 chest xray reviewed, bilateral small effusions, no significant effusions, no consolidations  918 cta, no pe, bilateral small effusions, no significant effusions, + copd, + emphysema     Quality  Measures:  EKG reviewed, Radiology images reviewed, Labs reviewed and Medications reviewed  Sidney  catheter: Critically Ill - Requiring Accurate Measurement of Urinary Output  DVT: pradaxa.  DVT prophylaxis - mechanical: SCDs  Ulcer prophylaxis: Not indicated  Antibiotics: Treating active infection/contamination beyond 24 hours perioperative coverage  Assessed for rehab: Patient was assess for and/or received rehabilitation services during this hospitalization     Assessment / Plan  Acute on chronic hypoxemic respiratory failure -intubated perioperatively 9/15, on high flow nasal cannula for hypoxia and now off.  On 5 L nc o2 today.  Likely due to acute on chronic exac copd/alpha antitrypsin def and hypervolemia.  Avoid sedatives.  TTE no vegetations, + pulmonary hypertension, moderate. Duonebs q 4.  On spiriva but hold until can use adequately.  Pulmicort bid. Hold symbicort.  Incentive spirometry, out of bed.  Lasix to PO, keep net neg -500 to 1 L daily.  Alpha-1 antitrypsin deficiency/COPD on chronic 5 L/min nasal cannula 24 hours a day with acute exacerbation, prolastin pending, gets Thursday dosing weekly, missed last weeks dose.  She defers CAMILLE.  Probable colonization as procal negative.  If worsens respiratory status then start antipseudomonal coverage       Valvular lesion, pending CAMILLE, continue current ab, staph epi bacteremia.  Deferred camille as above, tte repeat no vegetation     Pulmonary hypertension, moderate on echo, likely due to copd and emphysema.  Can follow up with pulmonary outpatient, may need right heart cath in future, though this seems secondary.     Community acquired pneumonia, continue current antibiotics, staph epidermidis bacteremia     Retained port catheter status post retrieval/left clavicle transection/subclavian vein repair 9/15              - surgical site management, MILDRED drain              - analgesia     GERD: ppi     Atrial fibrillation, chronic anticoagulation, rate cont, on dilt and bb, holding xarelto post operatively.     Tobacco dependence - nicotine patch     Sepsis,  resolved     Hyponatremia - improving            HLD - statin     Prophylaxis, diet        Discussed patient condition and risk of morbidity and/or mortality with Family, RN, RT, Pharmacy, Charge nurse / hot rounds and hospitalist.       I have performed physical exam and reviewed and updated physical exam, review of systems and plan today 9/20 /18.  In review of yesterdays's note, 9/19/18, there are no changes except as documented above.

## 2018-09-20 NOTE — PROGRESS NOTES
[facility] Hospitalist Progress Note    Date of Service: 2018    Chief Complaint  68 y.o. female admitted 2018 with shortness of breath    Ms Craig has a history of history of alpha-1 antitrypsin deficiency and chronic respiratory failure due to COPD, she is on 5L of O2 at baseline.  She presented to [facility] with pneumonia and sepsis.  Blood cultures were positive for staph epi.  Port removal was attempted but was unsuccessful.  She was transferred to [facility] for infectious disease consultation and vascular surgery availability.  On 9/15/18, she went to the OR for removal of infected retained catheter in the left subclavian vein requiring clavicle osteotomy.  Post operatively her respiratory status has been slow to recover.  Was tapered off high flow O2 on .    Interval Problem Update  Alert, oriented and appropriate  Saturating in 90's on nasal cannula  Off high flow x 24 hours  Coughing a little  Tolerating vancomycin without N/V or rash    Consultants/Specialty  Critical Care, I discussed the patient's condition with Dr. Mcmillan today on ICU Rounds    Disposition  OK to transfer to medical floor, orders placed on 18      Review of Systems   Constitutional: Negative for chills, fever and weight loss.   Respiratory: Positive for cough. Negative for hemoptysis and sputum production.    Cardiovascular: Negative for chest pain and palpitations.   Gastrointestinal: Negative for abdominal pain, nausea and vomiting.   Genitourinary: Negative for dysuria and urgency.   Musculoskeletal: Positive for back pain and myalgias.   Skin: Negative for itching and rash.      Physical Exam  Laboratory/Imaging   Hemodynamics  Temp (24hrs), Av.9 °C (98.4 °F), Min:36.6 °C (97.9 °F), Max:37.2 °C (98.9 °F)   Temperature: 36.8 °C (98.2 °F)  Pulse  Av  Min: 56  Max: 120 Heart Rate (Monitored): 77  NIBP: (!) 178/74      Respiratory      Respiration: (!) 26, Pulse Oximetry: 99 %, O2 Daily Delivery  Respiratory : Silicone Nasal Cannula     Given By:: Mouthpiece, PEP/CPT Method: Flutter Valve, Work Of Breathing / Effort: Mild  RUL Breath Sounds: Clear, RML Breath Sounds: Clear, RLL Breath Sounds: Diminished;Clear, OPAL Breath Sounds: Clear, LLL Breath Sounds: Diminished;Clear    Fluids    Intake/Output Summary (Last 24 hours) at 09/20/18 0918  Last data filed at 09/20/18 0800   Gross per 24 hour   Intake             1900 ml   Output             3475 ml   Net            -1575 ml       Nutrition  Orders Placed This Encounter   Procedures   • Diet Order Cardiac, Diabetic     Standing Status:   Standing     Number of Occurrences:   1     Order Specific Question:   Diet:     Answer:   Cardiac [6]     Order Specific Question:   Diet:     Answer:   Diabetic [3]     Physical Exam   Constitutional: She is oriented to person, place, and time. She appears well-developed and well-nourished.   HENT:   Head: Normocephalic and atraumatic.   Eyes: Pupils are equal, round, and reactive to light. EOM are normal. Right eye exhibits no discharge. Left eye exhibits no discharge. No scleral icterus.   Cardiovascular: Normal rate, regular rhythm and intact distal pulses.    No murmur heard.  2+ Radial Pulses  Brisk Capillary Refill   Pulmonary/Chest: Effort normal and breath sounds normal. No respiratory distress. She has no wheezes.   Incision at left clavicle C/D/I   Abdominal: Soft. Bowel sounds are normal. She exhibits no distension. There is no tenderness. There is no rebound.   Musculoskeletal: Normal range of motion. She exhibits no edema.   Neurological: She is alert and oriented to person, place, and time. No cranial nerve deficit.   Skin: Skin is warm and dry. She is not diaphoretic. No erythema.   Skin is warm and well perfused   Psychiatric: She has a normal mood and affect.       Recent Labs      09/18/18   0540  09/19/18   0828   WBC  20.8*  19.5*   RBC  4.43  4.63   HEMOGLOBIN  11.6*  12.6   HEMATOCRIT  37.8  39.1   MCV   85.3  84.4   MCH  26.2*  27.2   MCHC  30.7*  32.2*   RDW  46.4  45.8   PLATELETCT  271  279   MPV  9.8  9.6     Recent Labs      09/17/18   2333  09/18/18   0540  09/19/18   0828   SODIUM  138  135  137   POTASSIUM  3.8  4.0  3.6   CHLORIDE  95*  99  95*   CO2  31  33  34*   GLUCOSE  278*  206*  199*   BUN  21  19  15   CREATININE  0.60  0.41*  0.42*   CALCIUM  8.7  8.7  8.6                      Assessment/Plan     * Staphylococcus epidermidis bacteremia, likely due to catheter related bloodstream infection/infected MediPort- (present on admission)   Assessment & Plan    On IV vancomycin  Dose of vancomycin will be titrated to serum concentration levels to ensure adequate levels and reduce risk of toxicity  Spoke with Dr. tarango of infectious disease, IV vancomycin as recommended through 9/25/2018 at which time the patient will need to take enteral linezolid          Sepsis due to CAP, CRBSI (HCC)- (present on admission)   Assessment & Plan    This is sepsis (without associated acute organ dysfunction).    Source is pneumonia, and infected venous port.  Sepsis is resolved, her hemodynamics are stable        Infected venous access port- (present on admission)   Assessment & Plan    Now s/p removal of port        Acute on chronic respiratory failure with hypoxia (HCC)- (present on admission)   Assessment & Plan    On high flow oxygen, continue ICU care  Continue steroids  Diuresis as tolerated  Slow to improve but a little better today        CAP (community acquired pneumonia)- (present on admission)   Assessment & Plan    Normal procalcitonin levels ×2        Alpha-1-antitrypsin deficiency (HCC)- (present on admission)   Assessment & Plan    Every Thursday she receives IV Prolastin  Will need a PICC line as her prior port has been removed        Acquired circulating anticoagulants (HCC)- (present on admission)   Assessment & Plan    Back on Pradaxa        GERD (gastroesophageal reflux disease)- (present on admission)    Assessment & Plan    Pepcid        Tobacco dependence- (present on admission)   Assessment & Plan    Ongoing encouragement to stop smoking        COPD (chronic obstructive pulmonary disease) due to alpha-1 antitrypsin deficiency (HCC)- (present on admission)   Assessment & Plan    She is finally off high flow oxygen  Tapering dose of prednisone ordered, patient does take 10 mg of prednisone chronically at baseline        Paroxysmal atrial fibrillation (HCC)- (present on admission)   Assessment & Plan    On diltiazem and metoprolol  Monitor on telemetry  Pradaxa          Quality-Core Measures   Reviewed items::  Medications reviewed and Labs reviewed  Little catheter::  No Little  Antibiotics:  Treating active infection/contamination beyond 24 hours perioperative coverage

## 2018-09-20 NOTE — DISCHARGE PLANNING
Agency/Facility Name: [facility]   Spoke To: Raul  Outcome: They will accept however, they need additional information, such as who is providing antibiotics and when is planned DC. PAT Giron notified.

## 2018-09-20 NOTE — DOCUMENTATION QUERY
DOCUMENTATION QUERY    PROVIDERS: Please select “Cosign w/ note”to reply to query.    To better represent the severity of illness of your patient, please review the following information and exercise your independent professional judgment in responding to this query.     Sepsis and Acute Respiratory Failure are  documented in the Progress Notes. Based upon the clinical findings, risk factors, and treatment, can the relationship between these two conditions be further specified?    • Acute Respiratory Failure is related to severe sepsis  • Acute Respiratory Failure is not related to sepsis  • Other explanation of clinical findings  • Unable to determine    The medical record reflects the following:   Clinical Findings 9/15 MD note:  -Sepsis: source Pneumonia, and infected venous port  -Acute on chronic hypoxemic respiratory failure  9/18 Sputum cx results:  -Pseudomonas aeruginosa  9/15 ABG results:  -@1705: Ph 7.283, Pco2 81.9, Hco3 38.7, BE 9, Tco2 41, SO2 91   Treatment CXR  ABG  RT/O2 protocols  Vancomycin  Blood cx  Sputum cx  ID consult  Pulmonary consult   Risk Factors Sepsis  COPD   Chronic respiratory failure  Acute pulmonary edema  Atrial fibrillation  Infected venous port   Location within medical record History and Physical, Progress Notes and Lab Results      Thank you,   Marlon Romano RN BSN  Clinical   [phone] [facility] office  [phone] Cell phone

## 2018-09-20 NOTE — DISCHARGE PLANNING
Received Choice form at 2996  Agency/Facility Name: [facility]  Referral sent per Choice form @ [phone]

## 2018-09-20 NOTE — PROGRESS NOTES
"Pharmacy Kinetics 68 y.o. female on vancomycin day # 6 2018    Currently on Vancomycin 1400 mg IV q24h (0100)     Indication for Treatment: S epidermidis bacteremia / infected port     Pertinent history per medical record: Admitted on 2018 for port infection. Patient with implanted port for weekly Prolastin infusions for her alpha-1 antitrypsin deficiency. She is transferred from OSH where she presented with one day h/o SOB. She was admitted and treated for COPD/PNA. Her blood cx subsequently grew S epidermidis which was thought to be due to her port. Removal was felt to be too complicated and patient was transferred to [facility] for vascular consult.     Other antibiotics: none     Allergies: Patient has no known allergies.      List concerns for renal function: age, malnutrition/low albumin, fluid overload/lasix therapy     Pertinent cultures to date:   9/15/18: Blood, peripheral x2 = NGTD  9/15/18: subclavian cath tip = NGTD     Cultures from OSH:  18 Line: S epidermidis   18 blood-peripheral x 2:  S epidermidis x 1 of 2    Recent Labs      18   0540  18   0828   WBC  20.8*  19.5*   NEUTSPOLYS  91.00*  88.40*     Recent Labs      18   2333  18   0540  18   0828   BUN  21  19  15   CREATININE  0.60  0.41*  0.42*     Recent Labs      18   2333   VANCOTROUGH  10.6     Intake/Output Summary (Last 24 hours) at 18 1413  Last data filed at 18 0800   Gross per 24 hour   Intake             1150 ml   Output             2950 ml   Net            -1800 ml      Blood pressure 119/56, pulse 66, temperature 35.9 °C (96.7 °F), resp. rate 18, height 1.6 m (5' 2.99\"), weight 74 kg (163 lb 2.3 oz), SpO2 99 %, not currently breastfeeding. Temp (24hrs), Av.7 °C (98.1 °F), Min:35.9 °C (96.7 °F), Max:37 °C (98.6 °F)    A/P   1. Vancomycin dose change: continue current regimen  2. Next vancomycin level: 2 days  3. Goal trough: 12-16 mcg/mL  4. Assessment: The patient's " clinical status and renal function remain stable.  Echo has yet to be done, patient has been refusing - per ID, planning on 2 weeks of vancomycin followed by 2 weeks of PO linezolid.  5. Plan:  Continue current regimen with a recommended follow up level in 2 days.    Whitney Acosta, PharmD

## 2018-09-20 NOTE — DISCHARGE PLANNING
Anticipated Discharge Disposition: HH    Action: HH Referral  Informed by attending physician, Dr. Alvarado that the pt will need to resume her HH. Met face to face with pt who states she wants to stay with [facility]. Faxed and notified Reba GOODE.     Barriers to Discharge: Acceptance    Plan: Once accepted contact pt's family for ride back to [place].

## 2018-09-20 NOTE — FACE TO FACE
Face to Face Supporting Documentation - Home Health    The encounter with this patient was in whole or in part the primary reason for home health admission.    Date of encounter:   Patient:                    MRN:                       YOB: 2020  Mile Craig  [id number]  1950     Home health to see patient for:  Skilled Nursing care for assessment, interventions & education    Skilled need for:  New Onset Medical Diagnosis staph bacteremia    Skilled nursing interventions to include:  Venous access care, Home IV infusion therapy and Line/Drain/Airway education and care    Homebound status evidenced by:  Needs the assistance of another person in order to leave the home. Leaving home requires a considerable and taxing effort. There is a normal inability to leave the home.    Community Physician to provide follow up care: ANTONIA Dangelo     Optional Interventions? No      I certify the face to face encounter for this home health care referral meets the CMS requirements and the encounter/clinical assessment with the patient was, in whole, or in part, for the medical condition(s) listed above, which is the primary reason for home health care. Based on my clinical findings: the service(s) are medically necessary, support the need for home health care, and the homebound criteria are met.  I certify that this patient has had a face to face encounter by myself.  Delonte Alvarado M.D. - NPI: [id number]

## 2018-09-21 ENCOUNTER — APPOINTMENT (OUTPATIENT)
Dept: RADIOLOGY | Facility: MEDICAL CENTER | Age: 68
DRG: 270 | End: 2018-09-21
Attending: INTERNAL MEDICINE
Payer: MEDICARE

## 2018-09-21 LAB
GLUCOSE BLD-MCNC: 107 MG/DL (ref 65–99)
GLUCOSE BLD-MCNC: 143 MG/DL (ref 65–99)
GLUCOSE BLD-MCNC: 155 MG/DL (ref 65–99)
GLUCOSE BLD-MCNC: 196 MG/DL (ref 65–99)
GLUCOSE BLD-MCNC: 247 MG/DL (ref 65–99)

## 2018-09-21 PROCEDURE — A9270 NON-COVERED ITEM OR SERVICE: HCPCS | Performed by: SURGERY

## 2018-09-21 PROCEDURE — 99232 SBSQ HOSP IP/OBS MODERATE 35: CPT | Performed by: INTERNAL MEDICINE

## 2018-09-21 PROCEDURE — 700102 HCHG RX REV CODE 250 W/ 637 OVERRIDE(OP): Performed by: HOSPITALIST

## 2018-09-21 PROCEDURE — 700102 HCHG RX REV CODE 250 W/ 637 OVERRIDE(OP): Performed by: SURGERY

## 2018-09-21 PROCEDURE — 94669 MECHANICAL CHEST WALL OSCILL: CPT

## 2018-09-21 PROCEDURE — 82962 GLUCOSE BLOOD TEST: CPT | Mod: 91

## 2018-09-21 PROCEDURE — 94760 N-INVAS EAR/PLS OXIMETRY 1: CPT

## 2018-09-21 PROCEDURE — G8979 MOBILITY GOAL STATUS: HCPCS | Mod: CJ

## 2018-09-21 PROCEDURE — A9270 NON-COVERED ITEM OR SERVICE: HCPCS | Performed by: INTERNAL MEDICINE

## 2018-09-21 PROCEDURE — 700101 HCHG RX REV CODE 250: Performed by: ORTHOPAEDIC SURGERY

## 2018-09-21 PROCEDURE — 94667 MNPJ CHEST WALL 1ST: CPT

## 2018-09-21 PROCEDURE — A9270 NON-COVERED ITEM OR SERVICE: HCPCS | Performed by: HOSPITALIST

## 2018-09-21 PROCEDURE — 700102 HCHG RX REV CODE 250 W/ 637 OVERRIDE(OP): Performed by: INTERNAL MEDICINE

## 2018-09-21 PROCEDURE — 97162 PT EVAL MOD COMPLEX 30 MIN: CPT

## 2018-09-21 PROCEDURE — 71045 X-RAY EXAM CHEST 1 VIEW: CPT

## 2018-09-21 PROCEDURE — 94668 MNPJ CHEST WALL SBSQ: CPT

## 2018-09-21 PROCEDURE — 770006 HCHG ROOM/CARE - MED/SURG/GYN SEMI*

## 2018-09-21 PROCEDURE — 94640 AIRWAY INHALATION TREATMENT: CPT

## 2018-09-21 PROCEDURE — G8978 MOBILITY CURRENT STATUS: HCPCS | Mod: CK

## 2018-09-21 PROCEDURE — 700111 HCHG RX REV CODE 636 W/ 250 OVERRIDE (IP): Performed by: HOSPITALIST

## 2018-09-21 RX ORDER — IPRATROPIUM BROMIDE AND ALBUTEROL SULFATE 2.5; .5 MG/3ML; MG/3ML
3 SOLUTION RESPIRATORY (INHALATION)
Status: DISCONTINUED | OUTPATIENT
Start: 2018-09-21 | End: 2018-09-22 | Stop reason: HOSPADM

## 2018-09-21 RX ORDER — BUDESONIDE AND FORMOTEROL FUMARATE DIHYDRATE 80; 4.5 UG/1; UG/1
2 AEROSOL RESPIRATORY (INHALATION)
Status: DISCONTINUED | OUTPATIENT
Start: 2018-09-21 | End: 2018-09-22 | Stop reason: ALTCHOICE

## 2018-09-21 RX ORDER — TIOTROPIUM BROMIDE 18 UG/1
1 CAPSULE ORAL; RESPIRATORY (INHALATION)
Status: DISCONTINUED | OUTPATIENT
Start: 2018-09-22 | End: 2018-09-22

## 2018-09-21 RX ADMIN — METOPROLOL SUCCINATE 100 MG: 100 TABLET, FILM COATED, EXTENDED RELEASE ORAL at 06:07

## 2018-09-21 RX ADMIN — METOPROLOL SUCCINATE 100 MG: 100 TABLET, FILM COATED, EXTENDED RELEASE ORAL at 18:53

## 2018-09-21 RX ADMIN — INSULIN LISPRO 1 UNITS: 100 INJECTION, SOLUTION INTRAVENOUS; SUBCUTANEOUS at 11:58

## 2018-09-21 RX ADMIN — STANDARDIZED SENNA CONCENTRATE AND DOCUSATE SODIUM 2 TABLET: 8.6; 5 TABLET ORAL at 06:04

## 2018-09-21 RX ADMIN — OXYCODONE HYDROCHLORIDE 5 MG: 5 TABLET ORAL at 11:44

## 2018-09-21 RX ADMIN — OXYCODONE HYDROCHLORIDE 5 MG: 5 TABLET ORAL at 16:27

## 2018-09-21 RX ADMIN — INSULIN LISPRO 1 UNITS: 100 INJECTION, SOLUTION INTRAVENOUS; SUBCUTANEOUS at 23:31

## 2018-09-21 RX ADMIN — LISINOPRIL 20 MG: 20 TABLET ORAL at 06:05

## 2018-09-21 RX ADMIN — DILTIAZEM HYDROCHLORIDE 360 MG: 360 CAPSULE, EXTENDED RELEASE ORAL at 06:31

## 2018-09-21 RX ADMIN — DABIGATRAN ETEXILATE MESYLATE 150 MG: 150 CAPSULE ORAL at 06:02

## 2018-09-21 RX ADMIN — INSULIN GLARGINE 5 UNITS: 100 INJECTION, SOLUTION SUBCUTANEOUS at 18:50

## 2018-09-21 RX ADMIN — DABIGATRAN ETEXILATE MESYLATE 150 MG: 150 CAPSULE ORAL at 18:48

## 2018-09-21 RX ADMIN — ACETAMINOPHEN 650 MG: 325 TABLET, FILM COATED ORAL at 23:25

## 2018-09-21 RX ADMIN — OXYCODONE HYDROCHLORIDE 5 MG: 5 TABLET ORAL at 03:08

## 2018-09-21 RX ADMIN — PREDNISONE 50 MG: 50 TABLET ORAL at 06:03

## 2018-09-21 RX ADMIN — IPRATROPIUM BROMIDE AND ALBUTEROL SULFATE 3 ML: .5; 3 SOLUTION RESPIRATORY (INHALATION) at 19:02

## 2018-09-21 RX ADMIN — SIMVASTATIN 10 MG: 10 TABLET, FILM COATED ORAL at 18:48

## 2018-09-21 RX ADMIN — IPRATROPIUM BROMIDE AND ALBUTEROL SULFATE 3 ML: .5; 3 SOLUTION RESPIRATORY (INHALATION) at 08:16

## 2018-09-21 RX ADMIN — GUAIFENESIN 1200 MG: 600 TABLET, EXTENDED RELEASE ORAL at 06:03

## 2018-09-21 RX ADMIN — FUROSEMIDE 40 MG: 40 TABLET ORAL at 06:05

## 2018-09-21 RX ADMIN — OXYCODONE HYDROCHLORIDE 5 MG: 5 TABLET ORAL at 06:33

## 2018-09-21 RX ADMIN — STANDARDIZED SENNA CONCENTRATE AND DOCUSATE SODIUM 2 TABLET: 8.6; 5 TABLET ORAL at 18:00

## 2018-09-21 RX ADMIN — GUAIFENESIN 1200 MG: 600 TABLET, EXTENDED RELEASE ORAL at 18:53

## 2018-09-21 ASSESSMENT — COGNITIVE AND FUNCTIONAL STATUS - GENERAL
TURNING FROM BACK TO SIDE WHILE IN FLAT BAD: A LITTLE
MOVING TO AND FROM BED TO CHAIR: A LITTLE
CLIMB 3 TO 5 STEPS WITH RAILING: A LITTLE
MOBILITY SCORE: 19
SUGGESTED CMS G CODE MODIFIER MOBILITY: CK
STANDING UP FROM CHAIR USING ARMS: A LITTLE
MOVING FROM LYING ON BACK TO SITTING ON SIDE OF FLAT BED: A LITTLE

## 2018-09-21 ASSESSMENT — ENCOUNTER SYMPTOMS
WEIGHT LOSS: 0
BACK PAIN: 1
DIARRHEA: 0
CHILLS: 0
FOCAL WEAKNESS: 0
COUGH: 0
HEADACHES: 0
ABDOMINAL PAIN: 0
MYALGIAS: 1
SHORTNESS OF BREATH: 1
PALPITATIONS: 0
SPUTUM PRODUCTION: 0
HEMOPTYSIS: 0
FEVER: 0
VOMITING: 0
NAUSEA: 0

## 2018-09-21 ASSESSMENT — COPD QUESTIONNAIRES
HAVE YOU SMOKED AT LEAST 100 CIGARETTES IN YOUR ENTIRE LIFE: YES
COPD SCREENING SCORE: 8
DURING THE PAST 4 WEEKS HOW MUCH DID YOU FEEL SHORT OF BREATH: MOST  OR ALL OF THE TIME
DO YOU EVER COUGH UP ANY MUCUS OR PHLEGM?: YES, A FEW DAYS A WEEK OR MONTH

## 2018-09-21 ASSESSMENT — PAIN SCALES - GENERAL
PAINLEVEL_OUTOF10: 2
PAINLEVEL_OUTOF10: 7
PAINLEVEL_OUTOF10: 6
PAINLEVEL_OUTOF10: 6
PAINLEVEL_OUTOF10: 3
PAINLEVEL_OUTOF10: 7
PAINLEVEL_OUTOF10: 6
PAINLEVEL_OUTOF10: 0
PAINLEVEL_OUTOF10: 2
PAINLEVEL_OUTOF10: 2
PAINLEVEL_OUTOF10: 1

## 2018-09-21 ASSESSMENT — GAIT ASSESSMENTS
DISTANCE (FEET): 200
GAIT LEVEL OF ASSIST: SUPERVISED

## 2018-09-21 ASSESSMENT — LIFESTYLE VARIABLES: EVER_SMOKED: YES

## 2018-09-21 NOTE — PROGRESS NOTES
[facility] Hospitalist Progress Note    Date of Service: 2018    Chief Complaint  68 y.o. female admitted 2018 with shortness of breath    Ms Craig has a history of history of alpha-1 antitrypsin deficiency and chronic respiratory failure due to COPD, she is on 5L of O2 at baseline.  She presented to [facility] with pneumonia and sepsis.  Blood cultures were positive for staph epi.  Port removal was attempted but was unsuccessful.  She was transferred to [facility] for infectious disease consultation and vascular surgery availability.  On 9/15/18, she went to the OR for removal of infected retained catheter in the left subclavian vein requiring clavicle osteotomy.  Post operatively her respiratory status has been slow to recover.  Was tapered off high flow O2 on  to baseline of 5L NC.  Patient is currently awaiting further recommendations regarding placing PICC.    Interval Problem Update  Coughing improved.  Denies any SOB, fever, or chills.     Consultants/Specialty  Critical Care  Infectious Disease  Vascular Surgery    Disposition  TBD      Review of Systems   Constitutional: Negative for chills, fever and weight loss.   Respiratory: Negative for cough, hemoptysis and sputum production.    Cardiovascular: Negative for chest pain and palpitations.   Gastrointestinal: Negative for abdominal pain, nausea and vomiting.   Genitourinary: Negative for dysuria and urgency.   Musculoskeletal: Positive for back pain and myalgias.   Skin: Negative for itching and rash.      Physical Exam  Laboratory/Imaging   Hemodynamics  Temp (24hrs), Av.5 °C (97.7 °F), Min:35.9 °C (96.7 °F), Max:36.8 °C (98.3 °F)   Temperature: 36.4 °C (97.6 °F)  Pulse  Av.4  Min: 56  Max: 120 Heart Rate (Monitored): 72  Blood Pressure : 110/68, NIBP: 134/62      Respiratory      Respiration:  (18), Pulse Oximetry: 96 %, O2 Daily Delivery Respiratory : Silicone Nasal Cannula     Given By:: Mouthpiece, PEP/CPT Method: Jennifer  Valve, Work Of Breathing / Effort: Mild  RUL Breath Sounds: Expiratory Wheezes, RML Breath Sounds: Diminished, RLL Breath Sounds: Diminished, OPAL Breath Sounds: Expiratory Wheezes, LLL Breath Sounds: Diminished    Fluids    Intake/Output Summary (Last 24 hours) at 09/21/18 1120  Last data filed at 09/21/18 0305   Gross per 24 hour   Intake              400 ml   Output              800 ml   Net             -400 ml       Nutrition  Orders Placed This Encounter   Procedures   • Diet Order Cardiac, Diabetic     Standing Status:   Standing     Number of Occurrences:   1     Order Specific Question:   Diet:     Answer:   Cardiac [6]     Order Specific Question:   Diet:     Answer:   Diabetic [3]     Physical Exam   Constitutional: She is oriented to person, place, and time. She appears well-developed and well-nourished.   HENT:   Head: Normocephalic and atraumatic.   Eyes: Pupils are equal, round, and reactive to light. EOM are normal. Right eye exhibits no discharge. Left eye exhibits no discharge. No scleral icterus.   Cardiovascular: Normal rate, regular rhythm and intact distal pulses.    No murmur heard.  2+ Radial Pulses  Brisk Capillary Refill   Pulmonary/Chest: Effort normal and breath sounds normal. No respiratory distress. She has no wheezes.   Incision at left clavicle C/D/I   Abdominal: Soft. Bowel sounds are normal. She exhibits no distension. There is no tenderness. There is no rebound.   Musculoskeletal: Normal range of motion. She exhibits no edema.   Neurological: She is alert and oriented to person, place, and time. No cranial nerve deficit.   Skin: Skin is warm and dry. She is not diaphoretic. No erythema.   Skin is warm and well perfused   Psychiatric: She has a normal mood and affect.       Recent Labs      09/19/18 0828   WBC  19.5*   RBC  4.63   HEMOGLOBIN  12.6   HEMATOCRIT  39.1   MCV  84.4   MCH  27.2   MCHC  32.2*   RDW  45.8   PLATELETCT  279   MPV  9.6     Recent Labs      09/19/18 0828    SODIUM  137   POTASSIUM  3.6   CHLORIDE  95*   CO2  34*   GLUCOSE  199*   BUN  15   CREATININE  0.42*   CALCIUM  8.6                      Assessment/Plan     * Staphylococcus epidermidis bacteremia, likely due to catheter related bloodstream infection/infected MediPort- (present on admission)   Assessment & Plan    - MediPort removed  - On IV Vanco; repeat Cxs NGTD  - cont IV Vanc through 9/25 per ID recs  - switch to PO Linezolid after IV abx completed  - will discuss when PICC line placement appropriate for Prolastin treatments        Sepsis due to CAP, CRBSI (HCC)- (present on admission)   Assessment & Plan    This is sepsis (without associated acute organ dysfunction).    Source is pneumonia, and infected venous port/bacteremia.  Sepsis is resolved, her hemodynamics are stable        Infected venous access port- (present on admission)   Assessment & Plan    Now s/p removal of port        Acute on chronic respiratory failure with hypoxia (HCC)- (present on admission)   Assessment & Plan    - weaned back to baseline O2 of 4-5L NC  - cont steroids taper and inhalers  - RT protocol        CAP (community acquired pneumonia)- (present on admission)   Assessment & Plan    Normal procalcitonin levels ×2        Alpha-1-antitrypsin deficiency (HCC)- (present on admission)   Assessment & Plan    Every Thursday she receives IV Prolastin  Will need a PICC line as her prior port has been removed        Acquired circulating anticoagulants (HCC)- (present on admission)   Assessment & Plan    Back on Pradaxa        GERD (gastroesophageal reflux disease)- (present on admission)   Assessment & Plan    Pepcid        Tobacco dependence- (present on admission)   Assessment & Plan    Ongoing encouragement to stop smoking        COPD (chronic obstructive pulmonary disease) due to alpha-1 antitrypsin deficiency (HCC)- (present on admission)   Assessment & Plan    - weaned off HFNC to NC at baseline of 5L NC  - cont steroid taper to  10mg which she takes at baseline        Paroxysmal atrial fibrillation (HCC)- (present on admission)   Assessment & Plan    On diltiazem and metoprolol  Monitor on telemetry  Pradaxa          Quality-Core Measures   Reviewed items::  Medications reviewed and Labs reviewed  Little catheter::  No Little  Antibiotics:  Treating active infection/contamination beyond 24 hours perioperative coverage

## 2018-09-21 NOTE — PROGRESS NOTES
"   Orthopaedic Progress Note    Interval changes:  Patient doing well  Incision without incision    ROS - Patient denies any new issues.  Pain well controlled.    Blood pressure 110/68, pulse 72, temperature 36.4 °C (97.6 °F), resp. rate 18, height 1.6 m (5' 2.99\"), weight 74 kg (163 lb 2.3 oz), SpO2 96 %, not currently breastfeeding.      Patient seen and examined  No acute distress  Breathing non labored  RRR  LUE clavicle dressing CDI, moves all fingers, DNVI, cap refill < 2 sec.     Recent Labs      09/19/18   0828   WBC  19.5*   RBC  4.63   HEMOGLOBIN  12.6   HEMATOCRIT  39.1   MCV  84.4   MCH  27.2   MCHC  32.2*   RDW  45.8   PLATELETCT  279   MPV  9.6       Active Hospital Problems    Diagnosis   • Staphylococcus epidermidis bacteremia, likely due to catheter related bloodstream infection/infected MediPort [R78.81]     Priority: High   • Infected venous access port [T80.219A]     Priority: High   • Sepsis due to CAP, CRBSI ([facility]) [A41.9]     Priority: High   • Alpha-1-antitrypsin deficiency ([facility]) [E88.01]     Priority: Medium   • CAP (community acquired pneumonia) [J18.9]     Priority: Medium   • Acute on chronic respiratory failure with hypoxia ([facility]) [J96.21]     Priority: Medium   • Paroxysmal atrial fibrillation ([facility]) [I48.0]     Priority: Low   • COPD (chronic obstructive pulmonary disease) due to alpha-1 antitrypsin deficiency ([facility]) [J44.9]     Priority: Low   • Tobacco dependence [F17.200]     Priority: Low   • GERD (gastroesophageal reflux disease) [K21.9]     Priority: Low   • Acquired circulating anticoagulants ([facility]) [D68.318]     Priority: Low       Assessment/Plan:  Doing well post op  POD#6 S/P Repair of left clavicle osteotomy with internal fixation.  Wt bearing status - WBAT  Wound care/Drains - open to air  Future Procedures - none planned   Sutures/Staples out- 10-14 days post operatively  PT/OT-initiated  Antibiotics: vanco 1400mg IV QD  DVT Prophylaxis- TEDS/SCDs/Foot pumps  Little-none  Case " Coordination for Discharge Planning - Disposition per therapy recs

## 2018-09-21 NOTE — PROGRESS NOTES
Received report from BOBBY Luu. Patient arrived to the unit with transport. Patient is alert and oriented. Vital signs stable on 5 liters, baseline per patient. Oriented patient to room and discussed plan of care, pain management, safety, and fall risk. Patient complaint of back pain; medicated per MAR. CMS intact. Effective use of incentive spirometer pulling 1000. Safety and fall precautions in place, call light within reach, hourly rounding.     2 RN skin check complete with BOBBY Drake. Scattered bruising to bilateral upper extremities and trunk. Bruising and steri-strips in place to left upper chest; clean, dry, intact. Some excoriation and redness to sacrum; skin is blanching and intact.

## 2018-09-21 NOTE — CARE PLAN
Problem: Bronchopulmonary Hygiene:  Goal: Increase mobilization of retained secretions    Intervention: Perform bronchopulmonary therapy as indicated by assessment  Pt now on baseline O2 5 LPM. Pt receiving Aerobika QID /DuoNeb Q4.

## 2018-09-21 NOTE — PROGRESS NOTES
Pulmonary Critical Care Progress Note     Pulmonary Progress Note     Admission date: 9/14/2018     History of Present Illness: 68 y.o. female with a history of alpha-1 antitrypsin deficiency diagnosed 10 yrs ago is on Prolastin intravenously q. weekly, COPD/emphysema and chronic hypoxia on home oxygen @ 5 L/min nasal cannula, atrial fibrillation on Pradaxa who was evaluated at [facility] on September 14 complaining of shortness of breath and found to have evidence of pneumonia with fever, leukocytosis, abnormal chest x-ray findings.  She was started on antibiotics to cover for community acquired pneumonia.  Her blood cultures grew  staph epidermidis and the concern was that her port was infected and needed to be removed.  Removal was attempted by a surgeon at [facility] but unfortunately became sheared resulting in a retained catheter in the vessel.  She was transferred to [facility] for definitive care and seen by Dr. Vidal Gil who took her to the operating room today where she underwent successful retrieval of the catheter from the left subclavian/innominate vein but did require transection of the clavicle and repair of the subclavian vein followed by plating of the clavicle by orthopedics.  She was brought to the intensive care unit postprocedure for ongoing respiratory failure management and I was consulted by Dr. Ghotra.    9/21/18: Patient transferred out from ICU. Has dyspnea at rest and used accessory muscles of respiration. CXR shows no consolidation. Patient denies wheeze, still has cough productive of mucoid expectoration. ID following for bacteremia management. Patient is afebrile. No orthopnea or dependent edema. She was on advair and atrovent as outpatient but not on bronchodilators while in hospital. On oral steroids with taper     ROS:  Respiratory: Mild cough, negative pleuritic chest pain and positive shortness of breath, Cardiac: negative, negative chest pain and  negative palpitations, GI: negative, negative nausea and negative abdominal pain.  All other systems reviewed per myself and negative.                     Current Facility-Administered Medications   Medication Dose Frequency Provider Last Rate Last Dose   • furosemide (LASIX) injection 40 mg  40 mg BID DIURETIC Veto Mcmillan M.D.       • furosemide (LASIX) injection 40 mg  40 mg Once Veto Mcmillan M.D.       • methylPREDNISolone (SOLU-MEDROL) 40 MG injection 40 mg  40 mg Q12HRS Veto Mcmillan M.D.       • metoprolol SR (TOPROL XL) tablet 100 mg  100 mg BID Veto Mcmillan M.D.   100 mg at 09/18/18 0551   • guaiFENesin LA (MUCINEX) tablet 1,200 mg  1,200 mg Q12HRS Veto Mcmillan M.D.   1,200 mg at 09/18/18 0550   • dabigatran (PRADAXA) capsule 75 mg  75 mg BID Bill James D.YIN   75 mg at 09/18/18 0551   • ipratropium-albuterol (DUONEB) nebulizer solution  3 mL Q4HRS (RT) Jeremy M Gonda, M.D.   3 mL at 09/18/18 0635   • DILTIAZem CD (CARDIZEM CD) capsule 360 mg  360 mg DAILY Jeremy M Gonda, M.D.   360 mg at 09/18/18 0551   • albuterol inhaler 2 Puff  2 Puff Q4HRS PRN Jeremy M Gonda, M.D.   2 Puff at 09/16/18 1252   • sodium bicarbonate 8.4 % injection 3 mL  3 mL Q4HRS (RT) Jeremy M Gonda, M.D.   3 mL at 09/18/18 0635   • acetaminophen (TYLENOL) tablet 650 mg  650 mg Q6HRS PRN Jeremy M Gonda, M.D.   650 mg at 09/16/18 1642   • MD Alert...Vancomycin per Pharmacy   pharmacy to dose Rahul Burger M.D.       • vancomycin 1,400 mg in  mL IVPB  20 mg/kg Q24HR Rahul Burger M.D.   Stopped at 09/18/18 0309   • ipratropium-albuterol (DUONEB) nebulizer solution  3 mL Q2HRS PRN (RT) Jeremy M Gonda, M.D.       • simvastatin (ZOCOR) tablet 10 mg  10 mg Q EVENING Vidal Ghotra M.D.   10 mg at 09/17/18 1656   • senna-docusate (PERICOLACE or SENOKOT S) 8.6-50 MG per tablet 2 Tab  2 Tab BID Rahul Burger M.D.   2 Tab at 09/18/18 0550     And   • polyethylene glycol/lytes (MIRALAX) PACKET 1 Packet  1 Packet QDAY  PRN Rahul Burger M.D.         And   • magnesium hydroxide (MILK OF MAGNESIA) suspension 30 mL  30 mL QDAY PRN Rahul Burger M.D.   30 mL at 18 1349     And   • bisacodyl (DULCOLAX) suppository 10 mg  10 mg QDAY PRN Rahul Burger M.D.       • ondansetron (ZOFRAN) syringe/vial injection 4 mg  4 mg Q4HRS PRN Rahul Burger M.D.       • ondansetron (ZOFRAN ODT) dispertab 4 mg  4 mg Q4HRS PRN Vidal Ghotra M.D.       • Pharmacy Consult Request ...Pain Management Review   PRN Rahul Burger M.D.         And   • oxyCODONE immediate-release (ROXICODONE) tablet 5 mg  5 mg Q3HRS PRN Rahul Burger M.D.         And   • oxyCODONE immediate release (ROXICODONE) tablet 10 mg  10 mg Q3HRS PRN Rahul Burger M.D.   10 mg at 18 0339     And   • HYDROmorphone (DILAUDID) injection 0.5 mg  0.5 mg Q3HRS PRN Rahul Burger M.D.   0.5 mg at 18 1620   • tramadol (ULTRAM) 50 MG tablet 50 mg  50 mg Q4HRS PRN Rahul Burger M.D.   50 mg at 18 1836   • Respiratory Care per Protocol   Continuous RT Rahul Burger M.D.         Physical Exam:   Vital signs:      Temp  Av.8 °C (98.3 °F) Pulse: 73, Heart Rate (Monitored): 74  NIBP: 154/70 Pulse Oximetry: 98 %  General: Patient lying in bed, tachypnea present  HEENT: PERRLA, EOMI, no oral thrush oral mucosa is moist   Neck:  No JVD, no lymphadenopathy,no tracheal deviation. Accessory muscles of resp are prominent  CVS: S1, S2 normal, No murmur or rub  Respiratory: AP diameter is increased. Bilateral air entry No wheeze no crackles,  Abdomen/: soft, nt, neg hsm/m, no guarding, no rigidity  Extremities: trace edema, no erythema noted, adequate pulses ext and perfusion  Skin: no rashes or bruising noted,   Neuro/Psych: Alert and orientated to time place and person cn 2-12 intact, no focal motor loss     Labs:   Results for JACOB DAWKINS (MRN [id number]) as of 2018 15:16   Ref. Range 2018 08:28   WBC Latest Ref Range: 4.8 - 10.8  K/uL 19.5 (H)   RBC Latest Ref Range: 4.20 - 5.40 M/uL 4.63   Hemoglobin Latest Ref Range: 12.0 - 16.0 g/dL 12.6   Hematocrit Latest Ref Range: 37.0 - 47.0 % 39.1   MCV Latest Ref Range: 81.4 - 97.8 fL 84.4   MCH Latest Ref Range: 27.0 - 33.0 pg 27.2   MCHC Latest Ref Range: 33.6 - 35.0 g/dL 32.2 (L)   RDW Latest Ref Range: 35.9 - 50.0 fL 45.8   Platelet Count Latest Ref Range: 164 - 446 K/uL 279   MPV Latest Ref Range: 9.0 - 12.9 fL 9.6   Results for JACOB DAWKINS (MRN [id number]) as of 9/21/2018 15:16   Ref. Range 9/19/2018 08:28   Sodium Latest Ref Range: 135 - 145 mmol/L 137   Potassium Latest Ref Range: 3.6 - 5.5 mmol/L 3.6   Chloride Latest Ref Range: 96 - 112 mmol/L 95 (L)   Co2 Latest Ref Range: 20 - 33 mmol/L 34 (H)   Anion Gap Latest Ref Range: 0.0 - 11.9  8.0   Glucose Latest Ref Range: 65 - 99 mg/dL 199 (H)   Bun Latest Ref Range: 8 - 22 mg/dL 15   Creatinine Latest Ref Range: 0.50 - 1.40 mg/dL 0.42 (L)   GFR If  Latest Ref Range: >60 mL/min/1.73 m 2 >60   GFR If Non  Latest Ref Range: >60 mL/min/1.73 m 2 >60   Calcium Latest Ref Range: 8.5 - 10.5 mg/dL 8.6     COMPARISON: None    FINDINGS:  Pulmonary Embolism: None.    Lungs: Several tiny granulomas. Bibasilar atelectasis and trace bilateral pleural effusions. No focal consolidation. Emphysematous changes. No suspicious pulmonary nodules or masses.    Mediastinum: Unremarkable thyroid. No mediastinal mass.    Nodes: No enlarged lymph nodes.    Heart: Mild cardiomegaly with right atrial enlargement. No pericardial effusion. Coronary calcifications.    Aorta and great vessels: No aneurysm. Atherosclerosis.    Musculoskeletal structures: No acute fracture or destructive lesion.    Upper abdomen: Unremarkable.    Calcifications in the right breast, likely benign.   Impression         1. No evidence of pulmonary embolus.  2. Trace bilateral effusions and bibasilar atelectasis. Otherwise, no acute abnormality in the chest.  3.  Cardiomegaly with right atrial enlargement.  4. Emphysema.     Reading Provider Reading Date   Donn Khan M.D. Sep 19, 2018       CXR      9/21/2018 1:17 AM    HISTORY/REASON FOR EXAM:  Shortness of Breath.    TECHNIQUE/EXAM DESCRIPTION AND NUMBER OF VIEWS:  Single portable view of the chest.    COMPARISON: 9/20/2018    FINDINGS:    LUNGS: Stable slight hyperinflation. Stable bibasilar atelectasis/scarring and trace bilateral effusions.  PNEUMOTHORAX: None.  LINES AND TUBES: None.  MEDIASTINUM: Stable cardiac silhouette. Atherosclerosis.  MUSCULOSKELETAL STRUCTURES: Prior left clavicular ORIF. Surgical clips projecting over the left upper chest.   Impression       Stable mild hyperinflation, bibasilar atelectasis/scarring and trace bilateral effusions.   Reading Provider Reading Date   Donn Khan M.D. Sep 21, 2018       Assessment   Acute on chronic hypoxemic respiratory failure -intubated perioperatively 9/15,   COPD/ Emphysema  Alpha antitrypsin deficiency on augmentation therapy.     Pulmonary hypertension, moderate. Secondary to chronic hypoxic vasoconstriction    Staph bacteremia ID following  Community acquired pneumonia resolved  Retained port catheter status post retrieval/left clavicle transection/subclavian vein repair 9/15   GERD  Atrial fibrillation, on xarelto  Tobacco dependence - nicotine patch  Sepsis, resolved  Hyponatremia -resolved                 Plan:          Start symbicort and spiriva        Taper prednisone        Taper O2 as tolerated to maintain SpO2 above 90%        Incentive spirometry and Flutter valve        Patient educted to have her family members to be tested for A1AT levels / genotype too        Smoking cessation counseling done

## 2018-09-21 NOTE — CARE PLAN
Problem: Mobility  Goal: Risk for activity intolerance will decrease  Outcome: PROGRESSING AS EXPECTED  Tolerating increased activity well, states feels stronger every day since admission.

## 2018-09-21 NOTE — CARE PLAN
Problem: Safety  Goal: Will remain free from injury  Outcome: PROGRESSING AS EXPECTED  Provided patient education on safety and fall risk. Call light within reach. Bed locked in lowest position. Communication signs placed appropriately. Clutter-free environment. Patient verbalized understanding of education and uses call light appropriately.     Problem: Pain Management  Goal: Pain level will decrease to patient's comfort goal  Outcome: PROGRESSING AS EXPECTED  Provided patient education on pain management using pain scale. Medicated per MAR for back pain. Provided extra pillows for comfort. Patient verbalized understanding of education and communicates pain level effectively with RN.     Problem: Respiratory:  Goal: Respiratory status will improve  Outcome: PROGRESSING AS EXPECTED   Encouraging use of incentive spirometer/deep cough and breathe. Patient effectively pulling 1000 on incentive spirometer.

## 2018-09-21 NOTE — CARE PLAN
Problem: Infection  Goal: Will remain free from infection    Intervention: Implement standard precautions and perform hand washing before and after patient contact  Implemented standard precautions before and after all patient care.

## 2018-09-21 NOTE — PROGRESS NOTES
Assumed care of patient. Assessment complete, vital signs stable, telemetry in place. Continuing to monitor with hourly rounding.

## 2018-09-21 NOTE — PROGRESS NOTES
Infectious Disease Progress Note    Author: Cari Sandhu M.D. Date & Time of service: 2018  10:19 AM    Chief Complaint:  Follow-up of staph epidermidis bacteremia    Interval History:   afebrile, white count down some from 19.5 K to 18.5 K.  Repeat blood cultures are no growth to date.  Extubated this morning, patient feels okay, minimal pain breathing fine.  -remains afebrile.  Still on high flow oxygen.  Says she feels better but still cannot catch her breath.  WBC count is 22.6 creatinine 0.35 denies any pain at the site of port  2018 T-max 99.2 continues to have greenish phlegm.  Still on high flow oxygen.  Is on 5 L at home.  Overall feels tired but improved.  Denies any discharge from the port removal site  2018 T-max 99.4 creatinine 0.42 WBC 19.5 still on high flow oxygen but down to 40% from 100%. Feels better overall  2018 T-max 98.9 overall feeling much better.  Back on 5 L which is her baseline.  WBC 19.5 platelets 279  2018- Tmax 98.3 feels much better.  Respiratory status is back to baseline  Labs Reviewed, Medications Reviewed and Radiology Reviewed.    Review of Systems:  Review of Systems   Constitutional: Negative for chills, fever and weight loss.   Respiratory: Positive for shortness of breath. Negative for cough and sputum production.         Respiratory status is much improved   Cardiovascular: Negative for chest pain.   Gastrointestinal: Negative for abdominal pain, diarrhea, nausea and vomiting.   Genitourinary: Negative for dysuria.   Musculoskeletal: Negative for joint pain.   Skin: Negative for rash.   Neurological: Negative for focal weakness and headaches.   All other systems reviewed and are negative.      Hemodynamics:  Temp (24hrs), Av.5 °C (97.7 °F), Min:35.9 °C (96.7 °F), Max:36.8 °C (98.3 °F)  Temperature: 36.4 °C (97.6 °F)  Pulse  Av.4  Min: 56  Max: 120Heart Rate (Monitored): 72  Blood Pressure : 110/68, NIBP: 134/62       Physical  Exam:  Physical Exam   Constitutional: She is oriented to person, place, and time. She appears well-developed and well-nourished. No distress.   HENT:   Head: Normocephalic and atraumatic.   Nose: Nose normal.   Mouth/Throat: No oropharyngeal exudate.   Cushingoid facies   Eyes: Pupils are equal, round, and reactive to light. Conjunctivae and EOM are normal. Right eye exhibits no discharge. Left eye exhibits no discharge. No scleral icterus.   Neck: Neck supple. No JVD present.   Cardiovascular: Normal rate, regular rhythm, normal heart sounds and intact distal pulses.  Exam reveals no gallop and no friction rub.    No murmur heard.  Pulmonary/Chest: No respiratory distress. She has no wheezes. She has no rales.   The site of previous port on the left chest is clean.  Steri-Strips present  Coarse breath sounds   Abdominal: Soft. Bowel sounds are normal. She exhibits no distension. There is no tenderness. There is no rebound.   Musculoskeletal: Normal range of motion. She exhibits no edema.   Lymphadenopathy:     She has no cervical adenopathy.   Neurological: She is alert and oriented to person, place, and time.   No gross cranial nerve deficit, no FND   Skin: Skin is warm and dry.   Psychiatric: She has a normal mood and affect. Her behavior is normal.   Vitals reviewed.      Meds:    Current Facility-Administered Medications:   •  ipratropium-albuterol  •  furosemide  •  lisinopril  •  insulin glargine **AND** insulin lispro **AND** Accu-Chek Q6 if NPO **AND** NOTIFY MD and PharmD **AND** glucose 4 g **AND** dextrose 50%  •  [COMPLETED] predniSONE **FOLLOWED BY** [START ON 9/22/2018] predniSONE **FOLLOWED BY** [START ON 9/25/2018] predniSONE **FOLLOWED BY** [START ON 9/28/2018] predniSONE **FOLLOWED BY** [START ON 10/1/2018] predniSONE  •  tramadol  •  Notify provider if pain remains uncontrolled **AND** Use the numeric rating scale (NRS-11) on regular floors and Critical-Care Pain Observation Tool (CPOT) on  ICUs/Trauma to assess pain **AND** Pulse Ox (Oximetry) **AND** Pharmacy Consult Request **AND** If patient difficult to arouse and/or has respiratory depression, stop any opiates that are currently infusing and call a Rapid Response. **AND** oxyCODONE immediate-release **AND** [DISCONTINUED] oxyCODONE immediate-release **AND** HYDROmorphone  •  dabigatran  •  metoprolol SR  •  guaiFENesin LA  •  DILTIAZem CD  •  albuterol  •  acetaminophen  •  MD Alert...Vancomycin per Pharmacy  •  vancomycin  •  ipratropium-albuterol  •  simvastatin  •  senna-docusate **AND** polyethylene glycol/lytes **AND** magnesium hydroxide **AND** bisacodyl  •  ondansetron  •  ondansetron  •  Respiratory Care per Protocol    Labs:  Recent Labs      09/19/18   0828   WBC  19.5*   RBC  4.63   HEMOGLOBIN  12.6   HEMATOCRIT  39.1   MCV  84.4   MCH  27.2   RDW  45.8   PLATELETCT  279   MPV  9.6   NEUTSPOLYS  88.40*   LYMPHOCYTES  4.30*   MONOCYTES  5.50   EOSINOPHILS  0.00   BASOPHILS  0.20     Recent Labs      09/19/18   0828   SODIUM  137   POTASSIUM  3.6   CHLORIDE  95*   CO2  34*   GLUCOSE  199*   BUN  15     Recent Labs      09/19/18   0828   CREATININE  0.42*       Imaging:  Dx-chest-portable (1 View)    Result Date: 9/16/2018 9/16/2018 3:48 AM HISTORY/REASON FOR EXAM:  For indication of respiratory failure TECHNIQUE/EXAM DESCRIPTION AND NUMBER OF VIEWS: Single portable view of the chest. COMPARISON: Yesterday FINDINGS: The cardiomediastinal silhouettes are unchanged. Scattered bilateral interstitial opacities persist, in addition to left basilar atelectasis. Minimal atelectasis is also now developed in the right lung base. There is a small left pleural effusion. No pneumothorax. Endotracheal tube is no longer present.     New right basilar atelectasis. No significant change otherwise.    Dx-chest-portable (1 View)    Result Date: 9/15/2018  9/15/2018 4:46 PM HISTORY/REASON FOR EXAM:  POST INTUBATION. TECHNIQUE/EXAM DESCRIPTION AND NUMBER  OF VIEWS: Single portable view of the chest. COMPARISON: 9/15/2018 FINDINGS: Cardiac mediastinal contour is unchanged. Lungs show hypoinflation. Increased opacity LEFT lung base with blunting of the costophrenic angle. No pneumothorax. Pulmonary vasculature is prominent. Endotracheal tube in place with tip approximately 5 cm above the roxana. Interval removal of LEFT subclavian catheter. Postoperative change of medial LEFT clavicle. Multiple surgical clips project over the LEFT upper chest.     1.  Supportive tubing as described above. 2.  Hypoinflation with worsening LEFT basilar atelectasis and pulmonary vascular congestion. 3.  Minimal LEFT pleural effusion again seen. 4.  No pneumothorax.    Dx-chest-portable (1 View)    Result Date: 9/15/2018  9/15/2018 10:35 AM HISTORY/REASON FOR EXAM:  Shortness of Breath. TECHNIQUE/EXAM DESCRIPTION AND NUMBER OF VIEWS: Single portable view of the chest. COMPARISON: None FINDINGS: Heart size is within normal limits.  There is left subclavian central line present with tip in expected location of the superior vena cava. There is mild diffuse interstitial prominence/vascular congestion. No pulmonary infiltrates or consolidations are noted. There are probable trace effusions. There is probable underlying COPD.     Mild diffuse interstitial prominence/vascular congestion with probable trace effusions.    Dx-clavicle Left    Result Date: 9/15/2018  9/15/2018 2:30 PM HISTORY/REASON FOR EXAM: . LEFT clavicle transection. TECHNIQUE/EXAM DESCRIPTION AND NUMBER OF VIEWS:  2 fluoroscopic views of the LEFT clavicle. COMPARISON: Chest x-ray 9/15/2018 FINDINGS: Images show internal fixation of medial LEFT clavicle with plate and multiple screws. 3 Seconds fluoroscopy time utilized.     Limited intraoperative images showing internal fixation of medial LEFT clavicle.      Micro:  Results     Procedure Component Value Units Date/Time    CULTURE WOUND W/ GRAM STAIN [[id number]] Collected:   "09/15/18 1453    Order Status:  Completed Specimen:  Wound Updated:  09/20/18 1637     Gram Stain Result No organisms seen.     Significant Indicator NEG     Source WND     Site Subclavian Catheter     Culture Result Wound No growth at 72 hours.    ANAEROBIC CULTURE [[id number]] Collected:  09/15/18 1453    Order Status:  Completed Specimen:  Wound Updated:  09/20/18 1637     Significant Indicator NEG     Source WND     Site Subclavian Catheter     Anaerobic Culture, Culture Res No Anaerobes isolated.    FUNGAL CULTURE [[id number]] Collected:  09/15/18 1453    Order Status:  Completed Specimen:  Wound Updated:  09/20/18 1637     Significant Indicator NEG     Source WND     Site Subclavian Catheter     Fungal Culture Culture in progress.    AFB CULTURE [[id number]] Collected:  09/15/18 1453    Order Status:  Completed Specimen:  Wound Updated:  09/20/18 1637     Significant Indicator NEG     Source WND     Site Subclavian Catheter     AFB Culture Culture in progress.     AFB Smear Results No acid fast bacilli seen.    BLOOD CULTURE [[id number]] Collected:  09/15/18 0936    Order Status:  Completed Specimen:  Blood from Peripheral Updated:  09/20/18 1100     Significant Indicator NEG     Source BLD     Site PERIPHERAL     Blood Culture No growth after 5 days of incubation.    Narrative:       Per Hospital Policy: Only change Specimen Src: to \"Line\" if  specified by physician order.    BLOOD CULTURE [[id number]] Collected:  09/15/18 0937    Order Status:  Completed Specimen:  Blood from Peripheral Updated:  09/20/18 1100     Significant Indicator NEG     Source BLD     Site PERIPHERAL     Blood Culture No growth after 5 days of incubation.    Narrative:       Per Hospital Policy: Only change Specimen Src: to \"Line\" if  specified by physician order.    CULTURE RESPIRATORY W/ GRM STN [[id number]]  (Abnormal)  (Susceptibility) Collected:  09/18/18 1033    Order Status:  Completed Specimen:  Respirate from Sputum Updated:  " 09/20/18 1042     Significant Indicator POS (POS)     Source RESP     Site SPUTUM     Respiratory Culture Moderate growth usual upper respiratory elizabeth (A)     Gram Stain Result Few WBCs.  Few mixed bacteria, no predominant organism seen.  Specimen Quality Score: 1+       Respiratory Culture Pseudomonas aeruginosa  Moderate growth  P.aeruginosa may develop resistance during prolonged therapy  with all antibiotics. Isolates that are initially susceptible  may become resistant within three to four days after  initiation of therapy. Testing of repeat isolates may be  warranted.   (A)    Narrative:       Collected By:ALYSHA HI    Culture & Susceptibility     PSEUDOMONAS AERUGINOSA     Antibiotic Sensitivity Microscan Unit Status    Amikacin Sensitive <=16 mcg/mL Final    Method: SENSITIVITY, AXEL    Cefepime Intermediate 16 mcg/mL Final    Method: SENSITIVITY, AXEL    Ceftazidime Sensitive 8 mcg/mL Final    Method: SENSITIVITY, AXEL    Ciprofloxacin Sensitive <=1 mcg/mL Final    Method: SENSITIVITY, AXEL    Gentamicin Sensitive <=4 mcg/mL Final    Method: SENSITIVITY, AXEL    Imipenem Sensitive <=1 mcg/mL Final    Method: SENSITIVITY, AXEL    Meropenem Sensitive <=1 mcg/mL Final    Method: SENSITIVITY, AXEL    Pip/Tazobactam Sensitive 64 mcg/mL Final    Method: SENSITIVITY, AXEL    Tobramycin Sensitive <=4 mcg/mL Final    Method: SENSITIVITY, AXEL                       GRAM STAIN [[id number]] Collected:  09/18/18 1033    Order Status:  Completed Specimen:  Respirate Updated:  09/18/18 1801     Significant Indicator .     Source RESP     Site SPUTUM     Gram Stain Result Few WBCs.  Few mixed bacteria, no predominant organism seen.  Specimen Quality Score: 1+      Narrative:       Collected By:ALYSHA HI    CULTURE RESPIRATORY W/ GRM STN [[id number]]     Order Status:  Completed Specimen:  Respirate from Sputum     GRAM STAIN [[id number]] Collected:  09/15/18 1453    Order Status:  Completed Specimen:   Wound Updated:  09/16/18 1921     Significant Indicator .     Source WND     Site Subclavian Catheter     Gram Stain Result No organisms seen.    ACID FAST STAIN [[id number]] Collected:  09/15/18 1453    Order Status:  Completed Specimen:  Wound Updated:  09/16/18 1921     Significant Indicator NEG     Source WND     Site Subclavian Catheter     AFB Smear Results No acid fast bacilli seen.    Blood Culture [[id number]]     Order Status:  Canceled Specimen:  Blood from Peripheral     Blood Culture [[id number]]     Order Status:  Canceled Specimen:  Blood from Peripheral     Blood Culture [[id number]]     Order Status:  Canceled Specimen:  Blood from Peripheral     Blood Culture [[id number]]     Order Status:  Canceled Specimen:  Blood from Peripheral           Assessment:  Ms. Craig is a very pleasant 68-year-old white female who was originally admitted to the hospital on 09/14/2018 due to increasing   shortness of breath x 1 day. She has a past medical history significant for alpha-1 antitrypsin deficiency on weekly Prolastin infusions, and COPD on 5 L nasal cannula at home, A. fib on Pradaxa. She was transferred from [facility] where she presented with fever, leukocytosis, and sepsis initially thought to be due to pneumonia.  She was started on antibiotics for CAP and treated for COPD exacerbation.  She subsequently grew Staph epidermidis from her blood cultures.  Patient had an indwelling PowerPort and removal was attempted at OSH, but complicated by retained catheter.  She was transferred to [facility], and underwent successful retrieval of the catheter from the left subclavian, but it required transection of the clavicle and repair of the subclavian vein followed by plating of the clavicle by orthopedics.    She is currently on vancomycin, tolerating well.  We will plan to treat with 10 days of IV antibiotics.    Active Hospital Problems    Diagnosis   • *Staphylococcus epidermidis bacteremia, likely due to  catheter related bloodstream infection/infected MediPort [R78.81]   • Infected venous access port [T80.219A]   • Sepsis due to CAP, CRBSI ([facility]) [A41.9]   • CAP (community acquired pneumonia) [J18.9]   • Paroxysmal atrial fibrillation ([facility]) [I48.0]   • Alpha-1-antitrypsin deficiency ([facility]) [E88.01]   • COPD (chronic obstructive pulmonary disease) due to alpha-1 antitrypsin deficiency ([facility]) [J44.9]   • Chronic hypoxemic respiratory failure due to COPD ([facility]) [J96.11]   • Tobacco dependence [F17.200]   • GERD (gastroesophageal reflux disease) [K21.9]   • Acquired circulating anticoagulants ([facility]) [D68.318]   • Acute on chronic respiratory failure with hypoxia ([facility]) [J96.21]       Plan:  Sepsis, Staph epidermidis bacteremia, presumed due to infected port  -Complicated removal attempt at OS, retrieved successfully at [facility] on 9/15 -required transection of the clavicle and repair of the subclavian vein followed by plating of the clavicle  -Continue IV vancomycin  -Blood cultures are negative from 9/15/2018  TTE negative on 9/19/2018 for endocarditis  Continue vancomycin for through today and switch her to p.o. Zyvox for 2 weeks in a.m.      Alpha-1 antitrypsin deficiency  -On weekly Prolastin infusions        COPD on home oxygen, chronic hypoxemic respiratory failure  Patient is back to baseline  She is on 5 L of oxygen at home and currently is at that number  The sputum culture is growing Pseudomonas from 9/18/2018  Because the patient is clinically doing better with the oxygen requirement getting lower without treatment most likely it is a colonization.  Hence we will not treat it    Leukocytosis-  More likely due to methylprednisolone  Continue to monitor    ID will follow.  Please call with questions    Discussed with the team

## 2018-09-21 NOTE — THERAPY
"Physical Therapy Evaluation completed.   Bed Mobility:  Supine to Sit: Modified Independent (hob elevated.)  Transfers: Sit to Stand: Supervised  Gait: Level Of Assist: Supervised with No Equipment Needed       Plan of Care: Will benefit from Physical Therapy 3 times per week  Discharge Recommendations: Equipment: No Equipment Needed.     Pt is 68 yoF who recently underwent surgery for retrieval of a catheter from her L subclavian vein requiring a transection of her L clavicle. She is currently NWB of the LLE using a sling for comfort, although the pt declined. She has been diagnosed with alpha-1 antitrypsin deficiency diagnosed 10 years ago and has multiple comorbidities including COPD/emphysema and chronic hypoxia. She is on 5 L/min O2 at baseline. The pt's functional mobility is adequate to function within the home, however her activity tolerance is impaired secondary to her comorbidities. Will follow while in house to address activity intolerance. Recommend d/c home due to support from family and chronic nature of pathology. Pt would benefit from home health PT to address aerobic capacity.       See \"Rehab Therapy-Acute\" Patient Summary Report for complete documentation.     "

## 2018-09-21 NOTE — CARE PLAN
Problem: Knowledge Deficit  Goal: Knowledge of disease process/condition, treatment plan, diagnostic tests, and medications will improve  Outcome: PROGRESSING AS EXPECTED  Able to explain disease process and treatment plan, including what to expect upon discharge.

## 2018-09-22 ENCOUNTER — APPOINTMENT (OUTPATIENT)
Dept: RADIOLOGY | Facility: MEDICAL CENTER | Age: 68
DRG: 270 | End: 2018-09-22
Attending: INTERNAL MEDICINE
Payer: MEDICARE

## 2018-09-22 VITALS
DIASTOLIC BLOOD PRESSURE: 59 MMHG | HEIGHT: 63 IN | HEART RATE: 86 BPM | SYSTOLIC BLOOD PRESSURE: 97 MMHG | BODY MASS INDEX: 27.73 KG/M2 | WEIGHT: 156.53 LBS | RESPIRATION RATE: 16 BRPM | OXYGEN SATURATION: 94 % | TEMPERATURE: 96.5 F

## 2018-09-22 LAB
ANION GAP SERPL CALC-SCNC: 6 MMOL/L (ref 0–11.9)
BASOPHILS # BLD AUTO: 0.2 % (ref 0–1.8)
BASOPHILS # BLD: 0.03 K/UL (ref 0–0.12)
BUN SERPL-MCNC: 21 MG/DL (ref 8–22)
CALCIUM SERPL-MCNC: 9 MG/DL (ref 8.5–10.5)
CHLORIDE SERPL-SCNC: 100 MMOL/L (ref 96–112)
CO2 SERPL-SCNC: 33 MMOL/L (ref 20–33)
CREAT SERPL-MCNC: 0.5 MG/DL (ref 0.5–1.4)
EOSINOPHIL # BLD AUTO: 0.35 K/UL (ref 0–0.51)
EOSINOPHIL NFR BLD: 1.9 % (ref 0–6.9)
ERYTHROCYTE [DISTWIDTH] IN BLOOD BY AUTOMATED COUNT: 48.8 FL (ref 35.9–50)
GLUCOSE BLD-MCNC: 112 MG/DL (ref 65–99)
GLUCOSE BLD-MCNC: 287 MG/DL (ref 65–99)
GLUCOSE SERPL-MCNC: 130 MG/DL (ref 65–99)
HCT VFR BLD AUTO: 39.9 % (ref 37–47)
HGB BLD-MCNC: 12.5 G/DL (ref 12–16)
IMM GRANULOCYTES # BLD AUTO: 0.14 K/UL (ref 0–0.11)
IMM GRANULOCYTES NFR BLD AUTO: 0.8 % (ref 0–0.9)
LYMPHOCYTES # BLD AUTO: 4.08 K/UL (ref 1–4.8)
LYMPHOCYTES NFR BLD: 22.4 % (ref 22–41)
MCH RBC QN AUTO: 27.3 PG (ref 27–33)
MCHC RBC AUTO-ENTMCNC: 31.3 G/DL (ref 33.6–35)
MCV RBC AUTO: 87.1 FL (ref 81.4–97.8)
MONOCYTES # BLD AUTO: 1.16 K/UL (ref 0–0.85)
MONOCYTES NFR BLD AUTO: 6.4 % (ref 0–13.4)
NEUTROPHILS # BLD AUTO: 12.45 K/UL (ref 2–7.15)
NEUTROPHILS NFR BLD: 68.3 % (ref 44–72)
NRBC # BLD AUTO: 0 K/UL
NRBC BLD-RTO: 0 /100 WBC
PLATELET # BLD AUTO: 241 K/UL (ref 164–446)
PMV BLD AUTO: 9.6 FL (ref 9–12.9)
POTASSIUM SERPL-SCNC: 4.2 MMOL/L (ref 3.6–5.5)
RBC # BLD AUTO: 4.58 M/UL (ref 4.2–5.4)
SODIUM SERPL-SCNC: 139 MMOL/L (ref 135–145)
WBC # BLD AUTO: 18.2 K/UL (ref 4.8–10.8)

## 2018-09-22 PROCEDURE — 700101 HCHG RX REV CODE 250: Performed by: ORTHOPAEDIC SURGERY

## 2018-09-22 PROCEDURE — 71045 X-RAY EXAM CHEST 1 VIEW: CPT

## 2018-09-22 PROCEDURE — 99239 HOSP IP/OBS DSCHRG MGMT >30: CPT | Performed by: INTERNAL MEDICINE

## 2018-09-22 PROCEDURE — 99407 BEHAV CHNG SMOKING > 10 MIN: CPT

## 2018-09-22 PROCEDURE — 700111 HCHG RX REV CODE 636 W/ 250 OVERRIDE (IP): Performed by: HOSPITALIST

## 2018-09-22 PROCEDURE — A9270 NON-COVERED ITEM OR SERVICE: HCPCS | Performed by: HOSPITALIST

## 2018-09-22 PROCEDURE — A9270 NON-COVERED ITEM OR SERVICE: HCPCS | Performed by: INTERNAL MEDICINE

## 2018-09-22 PROCEDURE — 36415 COLL VENOUS BLD VENIPUNCTURE: CPT

## 2018-09-22 PROCEDURE — 85025 COMPLETE CBC W/AUTO DIFF WBC: CPT

## 2018-09-22 PROCEDURE — 700105 HCHG RX REV CODE 258: Performed by: HOSPITALIST

## 2018-09-22 PROCEDURE — 700102 HCHG RX REV CODE 250 W/ 637 OVERRIDE(OP): Performed by: HOSPITALIST

## 2018-09-22 PROCEDURE — 700102 HCHG RX REV CODE 250 W/ 637 OVERRIDE(OP): Performed by: INTERNAL MEDICINE

## 2018-09-22 PROCEDURE — 82962 GLUCOSE BLOOD TEST: CPT | Mod: 91

## 2018-09-22 PROCEDURE — 80048 BASIC METABOLIC PNL TOTAL CA: CPT

## 2018-09-22 RX ORDER — PREDNISONE 10 MG/1
TABLET ORAL
Qty: 50 TAB | Refills: 0 | Status: SHIPPED | OUTPATIENT
Start: 2018-09-22 | End: 2018-09-30

## 2018-09-22 RX ORDER — LINEZOLID 600 MG/1
600 TABLET, FILM COATED ORAL 2 TIMES DAILY
Qty: 28 TAB | Refills: 0 | Status: SHIPPED | OUTPATIENT
Start: 2018-09-22 | End: 2018-10-06

## 2018-09-22 RX ORDER — LISINOPRIL 20 MG/1
20 TABLET ORAL DAILY
Qty: 30 TAB | Refills: 0 | Status: SHIPPED | OUTPATIENT
Start: 2018-09-23

## 2018-09-22 RX ORDER — DABIGATRAN ETEXILATE 150 MG/1
150 CAPSULE ORAL 2 TIMES DAILY
Qty: 60 CAP | Refills: 0 | Status: SHIPPED | OUTPATIENT
Start: 2018-09-22

## 2018-09-22 RX ORDER — SODIUM CHLORIDE 9 MG/ML
INJECTION, SOLUTION INTRAVENOUS
Status: DISCONTINUED
Start: 2018-09-22 | End: 2018-09-22 | Stop reason: HOSPADM

## 2018-09-22 RX ORDER — ALBUTEROL SULFATE 90 UG/1
2 AEROSOL, METERED RESPIRATORY (INHALATION)
Status: DISCONTINUED | OUTPATIENT
Start: 2018-09-22 | End: 2018-09-22

## 2018-09-22 RX ORDER — SIMVASTATIN 10 MG
10 TABLET ORAL EVERY EVENING
Qty: 30 TAB | Refills: 11 | Status: SHIPPED | OUTPATIENT
Start: 2018-09-22

## 2018-09-22 RX ORDER — BUDESONIDE AND FORMOTEROL FUMARATE DIHYDRATE 160; 4.5 UG/1; UG/1
2 AEROSOL RESPIRATORY (INHALATION) 2 TIMES DAILY
Status: DISCONTINUED | OUTPATIENT
Start: 2018-09-22 | End: 2018-09-22 | Stop reason: HOSPADM

## 2018-09-22 RX ORDER — ALBUTEROL SULFATE 90 UG/1
2 AEROSOL, METERED RESPIRATORY (INHALATION)
Status: DISCONTINUED | OUTPATIENT
Start: 2018-09-22 | End: 2018-09-22 | Stop reason: HOSPADM

## 2018-09-22 RX ADMIN — OXYCODONE HYDROCHLORIDE 5 MG: 5 TABLET ORAL at 03:23

## 2018-09-22 RX ADMIN — BUDESONIDE AND FORMOTEROL FUMARATE DIHYDRATE 2 PUFF: 160; 4.5 AEROSOL RESPIRATORY (INHALATION) at 08:22

## 2018-09-22 RX ADMIN — GUAIFENESIN 1200 MG: 600 TABLET, EXTENDED RELEASE ORAL at 05:53

## 2018-09-22 RX ADMIN — LISINOPRIL 20 MG: 20 TABLET ORAL at 05:53

## 2018-09-22 RX ADMIN — PREDNISONE 40 MG: 20 TABLET ORAL at 05:54

## 2018-09-22 RX ADMIN — DILTIAZEM HYDROCHLORIDE 360 MG: 360 CAPSULE, EXTENDED RELEASE ORAL at 09:45

## 2018-09-22 RX ADMIN — STANDARDIZED SENNA CONCENTRATE AND DOCUSATE SODIUM 2 TABLET: 8.6; 5 TABLET ORAL at 05:54

## 2018-09-22 RX ADMIN — FUROSEMIDE 40 MG: 40 TABLET ORAL at 05:56

## 2018-09-22 RX ADMIN — IPRATROPIUM BROMIDE AND ALBUTEROL SULFATE 3 ML: .5; 3 SOLUTION RESPIRATORY (INHALATION) at 08:18

## 2018-09-22 RX ADMIN — OXYCODONE HYDROCHLORIDE 5 MG: 5 TABLET ORAL at 14:04

## 2018-09-22 RX ADMIN — DABIGATRAN ETEXILATE MESYLATE 150 MG: 150 CAPSULE ORAL at 05:57

## 2018-09-22 RX ADMIN — VANCOMYCIN HYDROCHLORIDE 1400 MG: 100 INJECTION, POWDER, LYOPHILIZED, FOR SOLUTION INTRAVENOUS at 03:01

## 2018-09-22 ASSESSMENT — PATIENT HEALTH QUESTIONNAIRE - PHQ9
SUM OF ALL RESPONSES TO PHQ9 QUESTIONS 1 AND 2: 0
2. FEELING DOWN, DEPRESSED, IRRITABLE, OR HOPELESS: NOT AT ALL
1. LITTLE INTEREST OR PLEASURE IN DOING THINGS: NOT AT ALL

## 2018-09-22 ASSESSMENT — PAIN SCALES - GENERAL
PAINLEVEL_OUTOF10: 7
PAINLEVEL_OUTOF10: 1
PAINLEVEL_OUTOF10: 2
PAINLEVEL_OUTOF10: 6
PAINLEVEL_OUTOF10: 2

## 2018-09-22 NOTE — PROGRESS NOTES
Pt appears to be at resp baseline. Attempted to reduce O2 to 4L, pt only tolerated for 30 min and had to have it increased to home level of 5L. Up to bathroom, had bm, pain controlled with oxycodone.

## 2018-09-22 NOTE — CARE PLAN
Problem: Safety  Goal: Will remain free from falls  Outcome: PROGRESSING AS EXPECTED  Treaded socks in place, bed in the lowest position, assisted pt during ambulation, call light and belongings within reach, pt call for assistance appropriately    Problem: Respiratory:  Goal: Respiratory status will improve  Outcome: PROGRESSING SLOWER THAN EXPECTED  Encourage IS usage every hour when awake.  IS volume achieved was 500.

## 2018-09-22 NOTE — PROGRESS NOTES
Pt discharged to home today. Did not want to wait for ID return call. Prescription for abx per ID recommendation to be picked up from her pharmacy. IV d/c'd. O2 changed to home oxygen unit brought by family. Left unit in wheelchair escorted by RN, accompanied by family.

## 2018-09-22 NOTE — DISCHARGE INSTRUCTIONS
Discharge Instructions    Discharged to home by car with relative. Discharged via wheelchair, hospital escort: Yes.  Special equipment needed: Oxygen (relative to bring home supply)    Be sure to schedule a follow-up appointment with your primary care doctor or any specialists as instructed.     Discharge Plan:   Diet Plan: Discussed  Activity Level: Discussed  Smoking Cessation Offered: Patient Refused  Confirmed Follow up Appointment: Patient to Call and Schedule Appointment  Confirmed Symptoms Management: Discussed  Pneumococcal Vaccine Administered/Refused: Not given - Patient refused pneumococcal vaccine  Influenza Vaccine Indication: Patient Refuses    I understand that a diet low in cholesterol, fat, and sodium is recommended for good health. Unless I have been given specific instructions below for another diet, I accept this instruction as my diet prescription.       Special Instructions: None    · Is patient discharged on Warfarin / Coumadin?   No     Depression / Suicide Risk    As you are discharged from this [facility] facility, it is important to learn how to keep safe from harming yourself.    Recognize the warning signs:  · Abrupt changes in personality, positive or negative- including increase in energy   · Giving away possessions  · Change in eating patterns- significant weight changes-  positive or negative  · Change in sleeping patterns- unable to sleep or sleeping all the time   · Unwillingness or inability to communicate  · Depression  · Unusual sadness, discouragement and loneliness  · Talk of wanting to die  · Neglect of personal appearance   · Rebelliousness- reckless behavior  · Withdrawal from people/activities they love  · Confusion- inability to concentrate     If you or a loved one observes any of these behaviors or has concerns about self-harm, here's what you can do:  · Talk about it- your feelings and reasons for harming yourself  · Remove any means that you might use to hurt  yourself (examples: pills, rope, extension cords, firearm)  · Get professional help from the community (Mental Health, Substance Abuse, psychological counseling)  · Do not be alone:Call your Safe Contact- someone whom you trust who will be there for you.  · Call your local CRISIS HOTLINE 826-0101 or 331-895-3416  · Call your local Children's Mobile Crisis Response Team [place] (922) 900-7667 or www.[employer]achio  · Call the toll free National Suicide Prevention Hotlines   · National Suicide Prevention Lifeline 267-261-TLLB (3428)  · National Hope Line Network 800-SUICIDE (305-8565)

## 2018-09-22 NOTE — RESPIRATORY CARE
COPD EDUCATION by COPD CLINICAL EDUCATOR  9/22/2018  at  9:48 AM by Dori Slade     Patient interviewed by COPD education team.  Patient unable to participate in full program.  Short intervention has been conducted.  A comprehensive packet including information about COPD, treatments, and smoking cessation given.

## 2018-09-22 NOTE — DISCHARGE SUMMARY
Discharge Summary    CHIEF COMPLAINT ON ADMISSION  Shortness of Breath      Reason for Admission  infected troy-cath     Admission Date  9/14/2018    CODE STATUS  Full Code    HPI & HOSPITAL COURSE  This is a 68 y.o. female here with history of alpha-1 antitrypsin deficiency on Prolastin infusion and chronic respiratory failure due to COPD, she is on 5L of O2 at baseline.  She presented to [facility] with pneumonia and sepsis.  Blood cultures were positive for staph epi.  Port removal was attempted but was unsuccessful.  She was transferred to [facility] for infectious disease consultation and vascular surgery availability.  On 9/15/18, she went to the OR for removal of infected retained catheter in the left subclavian vein requiring clavicle osteotomy with internal fixation repair.  Post operatively her respiratory status has been slow to recover.  Was tapered off high flow O2 on 9/19 to baseline of 5L NC.  As respiratory status stabilized, patient continued to tolerate steroid taper, IV antibiotics per ID recommendations.  PT/OT was consulted and recommended no needs upon discharge.  Per ID recommendations, patient was switched to PO Linezolid and instructed to complete a 2wk course of oral treatment.  PICC line placement was attempted to be arranged while inpatient since Bld Cx's since antibiotic treatment remained sterile (NGTD).  Patient elected to have access evaluation to be completed as an outpatient. Currently, patient is medically stable for discharge home on medications listed below.      Therefore, she is discharged in fair and stable condition to home with close outpatient follow-up.    The patient met 2-midnight criteria for an inpatient stay at the time of discharge.    Discharge Date  9/22/2018    FOLLOW UP ITEMS POST DISCHARGE  F/U with PCP in 1-2 weeks  F/U with Prolastin treatments as scheduled    DISCHARGE DIAGNOSES  Principal Problem:    Staphylococcus epidermidis bacteremia, likely due  to catheter related bloodstream infection/infected MediPort POA: Yes  Active Problems:    Infected venous access port POA: Yes    Sepsis due to CAP, [facility] ([facility]) POA: Yes    Alpha-1-antitrypsin deficiency (HCC) POA: Yes    CAP (community acquired pneumonia) POA: Yes    Acute on chronic respiratory failure with hypoxia (HCC) POA: Yes    Paroxysmal atrial fibrillation (HCC) POA: Yes    COPD (chronic obstructive pulmonary disease) due to alpha-1 antitrypsin deficiency (HCC) POA: Yes    Tobacco dependence POA: Yes    GERD (gastroesophageal reflux disease) POA: Yes    Acquired circulating anticoagulants (HCC) POA: Yes  Resolved Problems:    * No resolved hospital problems. *      FOLLOW UP  No future appointments.  No follow-up provider specified.    MEDICATIONS ON DISCHARGE     Medication List      START taking these medications      Instructions   dabigatran 150 MG Caps capsule  Commonly known as:  PRADAXA   Take 1 Cap by mouth 2 Times a Day.  Dose:  150 mg     linezolid 600 MG Tabs  Commonly known as:  ZYVOX   Take 1 Tab by mouth 2 times a day for 14 days.  Dose:  600 mg     lisinopril 20 MG Tabs  Commonly known as:  PRINIVIL   Take 1 Tab by mouth every day.  Dose:  20 mg     simvastatin 10 MG Tabs  Commonly known as:  ZOCOR   Take 1 Tab by mouth every evening.  Dose:  10 mg        CHANGE how you take these medications      Instructions   predniSONE 10 MG Tabs  What changed:  · how much to take  · how to take this  · when to take this  · additional instructions  Commonly known as:  DELTASONE   Take 40mg (4tabs) for 2 days; then take 30mg (3 tabs) for 3 days; then take 20mg (2 tabs) for 3 days; then take 1 tab(10mg) daily.        CONTINUE taking these medications      Instructions   DILTIAZem  MG Cp24  Commonly known as:  CARDIZEM CD   Take 360 mg by mouth every day.  Dose:  360 mg     fluticasone-salmeterol 500-50 MCG/DOSE Aepb  Commonly known as:  ADVAIR   Inhale 1 Puff by mouth every 12 hours.  Dose:  1 Puff      furosemide 20 MG Tabs  Commonly known as:  LASIX   Take 20 mg by mouth 1 time daily as needed.  Dose:  20 mg     guaiFENesin  MG Tb12  Commonly known as:  MUCINEX   Take 1,200 mg by mouth every 12 hours.  Dose:  1200 mg     metoprolol  MG Tb24  Commonly known as:  TOPROL XL   Take 100 mg by mouth 2 Times a Day.  Dose:  100 mg     NON SPECIFIED   60 mg/kg by Intravenous route every 7 days.  Dose:  60 mg/kg     raNITidine 150 MG Tabs  Commonly known as:  ZANTAC   Take 150 mg by mouth every day.  Dose:  150 mg     tramadol 50 MG Tabs  Commonly known as:  ULTRAM   Take 50 mg by mouth every four hours as needed for Moderate Pain.  Dose:  50 mg            Allergies  No Known Allergies    DIET  Orders Placed This Encounter   Procedures   • Diet Order Cardiac, Diabetic     Standing Status:   Standing     Number of Occurrences:   1     Order Specific Question:   Diet:     Answer:   Cardiac [6]     Order Specific Question:   Diet:     Answer:   Diabetic [3]       ACTIVITY  As tolerated.  Weight bearing as tolerated    CONSULTATIONS  Vascular Surgery  Infectious Disease  Pulmonary/Critical Care    PROCEDURES  9/15: Retrieval of Infected Retained Central Venous Catheter left subclavian/innomnate vein, repair of subclavian vein, clavicle transection.  9/15: Repair of left clavicle osteotomy with internal fixation    LABORATORY  Lab Results   Component Value Date    SODIUM 139 09/22/2018    POTASSIUM 4.2 09/22/2018    CHLORIDE 100 09/22/2018    CO2 33 09/22/2018    GLUCOSE 130 (H) 09/22/2018    BUN 21 09/22/2018    CREATININE 0.50 09/22/2018        Lab Results   Component Value Date    WBC 18.2 (H) 09/22/2018    HEMOGLOBIN 12.5 09/22/2018    HEMATOCRIT 39.9 09/22/2018    PLATELETCT 241 09/22/2018        Total time of the discharge process exceeds 46 minutes.

## 2018-09-24 ENCOUNTER — TELEPHONE (OUTPATIENT)
Dept: INFECTIOUS DISEASES | Facility: MEDICAL CENTER | Age: 68
End: 2018-09-24

## 2018-09-24 NOTE — TELEPHONE ENCOUNTER
Called and spoke with pt. She lives in [facility] which is a 5 hour drive and she does not plan on coming to [place] anytime soon. She is following up with her PCP in [place]. There was a interaction with one of her medications and the Linezolid. She is speaking with her PCP on how to stop the one medication for 2 weeks and just take the Linezolid. She does not want to make a HF appointment. To far for her to drive. She will call if she needs further assistance. YARITZA

## 2018-10-17 LAB
FUNGUS SPEC CULT: NORMAL
SIGNIFICANT IND 70042: NORMAL
SITE SITE: NORMAL
SOURCE SOURCE: NORMAL

## 2018-10-23 NOTE — ADDENDUM NOTE
Encounter addended by: Veto Mcmillan M.D. on: 10/23/2018 11:33 AM<BR>    Actions taken: Sign clinical note

## 2018-10-23 NOTE — ADDENDUM NOTE
Encounter addended by: Veto Mcmillan M.D. on: 10/23/2018 10:59 AM<BR>    Actions taken: Sign clinical note

## 2018-11-11 LAB
MYCOBACTERIUM SPEC CULT: NORMAL
RHODAMINE-AURAMINE STN SPEC: NORMAL
SIGNIFICANT IND 70042: NORMAL
SITE SITE: NORMAL
SOURCE SOURCE: NORMAL

## 2022-11-01 NOTE — PROGRESS NOTES
"Pharmacy Kinetics  2018    68 y.o. female on day #7 of vancomycin therapy for S epidermidis bacteremia / infected port.    Current regimen: Vancomycin 1400 mg iv q24hr (0100)    Pertinent history: Admitted on 2018 for port infection. Patient with implanted port for weekly Prolastin infusions for her alpha-1 antitrypsin deficiency. She is transferred from OSH where she presented with one day h/o SOB. She was admitted and treated for COPD/PNA. Her blood cx subsequently grew S epidermidis which was thought to be due to her port. Complicated removal attempt at OSH, retrieved successfully at [facility] on 9/15 -required transection of the clavicle and repair of the subclavian vein followed by plating of the clavicle.    Imaging:   ·  TTE- negative for enocarditis    Other antimicrobials:   · none    Allergies: Patient has no known allergies.     Concerns for renal function:  · Age      · BUN/SCr ratio > 20:1  · Malnutrition/low albumin - 3.1     Pertinent cultures to date:   · 9/15 pBCx 2/2 x2 = NGF ()  · 9/15 subclavian cath tip = NGF ()  ·  sputum: Pseudomonas aeruginosa      Cultures from OSH:  · 18 Line: S epidermidis   · 18 pBCx 2/2 x 1/2 - S epidermidis    Recent Labs      18   0540  18   0828   WBC  20.8*  19.5*   NEUTSPOLYS  91.00*  88.40*     Recent Labs      18   2333  18   0540  18   0828   BUN  21  19  15   CREATININE  0.60  0.41*  0.42*     Recent Labs      18   2333   VANCOTROUGH  10.6     Blood pressure 109/65, pulse 75, temperature 37 °C (98.6 °F), resp. rate 20, height 1.6 m (5' 2.99\"), weight 74 kg (163 lb 2.3 oz), SpO2 94 %, not currently breastfeeding. Temp (24hrs), Av.4 °C (97.6 °F), Min:35.9 °C (96.7 °F), Max:37 °C (98.6 °F)    A/P Indication: Staph epi bacteremia  Goal Vancomycin trough 12-16 mcg/mL  1. Vancomycin dose change: no change  2. Next vancomycin level:  @ 0030 (@ )  3. Comments:   · AFVSS, No new labs since , " UOP not documented. Infected port removed 9/15, repeat blood cultures from 9/15 negative- finalized.   · Per RID on 9/21- continue vancomycin for through today and switch her to p.o. Zyvox for 2 weeks in a.m.  · Continue on current regimen, with plans to DC tomorrow. If continued will obtain level in 2 days to ensure patient remains within goal range.     Maddison Wasserman, PharmD         blood glucose testing

## (undated) DEVICE — COVER LIGHT HANDLE FLEXIBLE - SOFT (2EA/PK 80PK/CA)

## (undated) DEVICE — SENSOR SPO2 NEO LNCS ADHESIVE (20/BX) SEE USER NOTES

## (undated) DEVICE — SPONGE GAUZE STER 4X4 8-PL - (2/PK 50PK/BX 12BX/CS)

## (undated) DEVICE — SUTURE 6-0 PROLENE BV-1 D/A 24 (36PK/BX)"

## (undated) DEVICE — SUTURE 0 VICRYL PLUS UR-6 - 27 INCH (36/BX)

## (undated) DEVICE — SUCTION INSTRUMENT YANKAUER BULBOUS TIP W/O VENT (50EA/CA)

## (undated) DEVICE — GLOVE BIOGEL PI ORTHO SZ 7.5 PF LF (40PR/BX)

## (undated) DEVICE — SUTURE 5-0 PROLENE BLUE C-1 HS 1 X 30 (36EA/BX)"

## (undated) DEVICE — CLIP SM INTNL HRZN TI ESCP LGT - (24EA/PK 25PK/BX)

## (undated) DEVICE — PROTECTOR ULNA NERVE - (36PR/CA)

## (undated) DEVICE — SUTURE 4-0 MONOCRYL PLUS PS-2 - 27 INCH (36/BX)

## (undated) DEVICE — SET EXTENSION WITH 2 PORTS (48EA/CA) ***PART #2C8610 IS A SUBSTITUTE*****

## (undated) DEVICE — GOWN SURGEONS LARGE - (32/CA)

## (undated) DEVICE — LACTATED RINGERS INJ 1000 ML - (14EA/CA 60CA/PF)

## (undated) DEVICE — SLEEVE, VASO, THIGH, MED

## (undated) DEVICE — VESSELOOP MINI BLUE STERILE - SURG-I-LOOP (10EA/BX)

## (undated) DEVICE — DRILL BIT SYN 2.8 165MM (5TX2+3TX1=13)

## (undated) DEVICE — SUTURE 3-0 ETHILON FS-1 - (36/BX) 30 INCH

## (undated) DEVICE — SUTURE ETHILON 2-0 FSLX 30 (36PK/BX)"

## (undated) DEVICE — VESSELOOP MAXI BLUE STERILE- SURG-I-LOOP (10EA/BX)

## (undated) DEVICE — HEAD HOLDER JUNIOR/ADULT

## (undated) DEVICE — SOD. CHL. INJ. 0.9% 250 ML - (36/CA 50CA/PF)

## (undated) DEVICE — GLOVE BIOGEL SZ 8 SURGICAL PF LTX - (50PR/BX 4BX/CA)

## (undated) DEVICE — SUTURE 3-0 VICRYL PLUS SH - 8X 18 INCH (12/BX)

## (undated) DEVICE — CLIP MED INTNL HRZN TI ESCP - (25/BX)

## (undated) DEVICE — SUTURE 3-0 SILK 12 X 18 IN - (36/BX)

## (undated) DEVICE — SUTURE 4-0 MONOCRYL PLUS PS-1 - 27 INCH (36/BX)

## (undated) DEVICE — NEPTUNE 4 PORT MANIFOLD - (20/PK)

## (undated) DEVICE — SHEET THYROID - (10EA/CA)

## (undated) DEVICE — DECANTER FLD BLS - (50/CA)

## (undated) DEVICE — SYRINGE SAFETY 5 ML 18 GA X 1-1/2 BLUNT LL (100/BX 4BX/CA)

## (undated) DEVICE — ELECTRODE 850 FOAM ADHESIVE - HYDROGEL RADIOTRNSPRNT (50/PK)

## (undated) DEVICE — BAG DECANTER (50EA/CS)

## (undated) DEVICE — CHLORAPREP 26 ML APPLICATOR - ORANGE TINT(25/CA)

## (undated) DEVICE — GLOVE BIOGEL SZ 6.5 SURGICAL PF LTX (50PR/BX 4BX/CA)

## (undated) DEVICE — KIT ROOM DECONTAMINATION

## (undated) DEVICE — PACK MINOR BASIN - (2EA/CA)

## (undated) DEVICE — SUTURE GENERAL

## (undated) DEVICE — DRILL BIT 2.5 TWIST 310.25 (8TX2+1TX3=19)

## (undated) DEVICE — GOWN WARMING STANDARD FLEX - (30/CA)

## (undated) DEVICE — SET LEADWIRE 5 LEAD BEDSIDE DISPOSABLE ECG (1SET OF 5/EA)

## (undated) DEVICE — DRAPE C-ARM LARGE 41IN X 74 IN - (10/BX 2BX/CA)

## (undated) DEVICE — KIT ANESTHESIA W/CIRCUIT & 3/LT BAG W/FILTER (20EA/CA)

## (undated) DEVICE — TUBING CLEARLINK DUO-VENT - C-FLO (48EA/CA)

## (undated) DEVICE — MASK ANESTHESIA ADULT  - (100/CA)

## (undated) DEVICE — CANISTER SUCTION 3000ML MECHANICAL FILTER AUTO SHUTOFF MEDI-VAC NONSTERILE LF DISP  (40EA/CA)